# Patient Record
Sex: FEMALE | Race: WHITE | Employment: PART TIME | URBAN - METROPOLITAN AREA
[De-identification: names, ages, dates, MRNs, and addresses within clinical notes are randomized per-mention and may not be internally consistent; named-entity substitution may affect disease eponyms.]

---

## 2017-01-27 RX ORDER — TRAMADOL HYDROCHLORIDE 50 MG/1
50 TABLET ORAL
Qty: 45 TAB | Refills: 0 | OUTPATIENT
Start: 2017-01-27 | End: 2017-02-01 | Stop reason: SDUPTHER

## 2017-01-27 NOTE — TELEPHONE ENCOUNTER
From: Judy Hylton  To: Day Pack MD  Sent: 1/27/2017 1:53 PM EST  Subject: Medication Renewal Request    Original authorizing provider: MD Judy Linares would like a refill of the following medications:  traMADol (ULTRAM) 50 mg tablet Day Pack MD]    Preferred pharmacy: [facility] [address] [facility] [place] [address]:

## 2017-01-27 NOTE — TELEPHONE ENCOUNTER
Prescription for tramadol 50 mg tablet take 1 tab by mouth every 8 hours as needed for pain Max daily amount 150 mg #45 no refills called to [address] per written order of Dr. Veronica Melvin.   Prescription left on pharmacy voice mail at 4:04pm.

## 2017-02-01 RX ORDER — TRAMADOL HYDROCHLORIDE 50 MG/1
TABLET ORAL
Qty: 45 TAB | Refills: 0 | OUTPATIENT
Start: 2017-02-01 | End: 2017-04-04 | Stop reason: SDUPTHER

## 2017-02-02 NOTE — TELEPHONE ENCOUNTER
Prescription for tramadol 50 mg tablet take 1 tablet by mouth every 8 hours as needed for pain max 3 tabs per day #45 tab no refills called to [address] per written order of Dr. Carlos Helton. Prescription left on pharmacy voice mail at 8 am. Patient sent my chart message asking her to schedule an appt.

## 2017-04-04 ENCOUNTER — OFFICE VISIT (OUTPATIENT)
Dept: INTERNAL MEDICINE CLINIC | Age: 57
End: 2017-04-04

## 2017-04-04 VITALS
SYSTOLIC BLOOD PRESSURE: 133 MMHG | BODY MASS INDEX: 50.02 KG/M2 | RESPIRATION RATE: 18 BRPM | DIASTOLIC BLOOD PRESSURE: 82 MMHG | TEMPERATURE: 98.9 F | OXYGEN SATURATION: 98 % | WEIGHT: 293 LBS | HEIGHT: 64 IN | HEART RATE: 98 BPM

## 2017-04-04 DIAGNOSIS — M85.89 OSTEOPENIA OF MULTIPLE SITES: ICD-10-CM

## 2017-04-04 DIAGNOSIS — M51.26 LUMBAR HERNIATED DISC: ICD-10-CM

## 2017-04-04 DIAGNOSIS — M16.12 PRIMARY OSTEOARTHRITIS OF LEFT HIP: ICD-10-CM

## 2017-04-04 DIAGNOSIS — Z12.11 SCREEN FOR COLON CANCER: ICD-10-CM

## 2017-04-04 DIAGNOSIS — M51.36 DEGENERATIVE LUMBAR DISC: ICD-10-CM

## 2017-04-04 DIAGNOSIS — F41.8 DEPRESSION WITH ANXIETY: ICD-10-CM

## 2017-04-04 DIAGNOSIS — Z13.1 SCREENING FOR DIABETES MELLITUS: ICD-10-CM

## 2017-04-04 DIAGNOSIS — M79.7 FIBROMYALGIA: ICD-10-CM

## 2017-04-04 DIAGNOSIS — Z13.21 ENCOUNTER FOR VITAMIN DEFICIENCY SCREENING: ICD-10-CM

## 2017-04-04 DIAGNOSIS — Z12.4 SCREENING FOR CERVICAL CANCER: ICD-10-CM

## 2017-04-04 DIAGNOSIS — M54.32 LEFT SIDED SCIATICA: ICD-10-CM

## 2017-04-04 DIAGNOSIS — M17.0 PRIMARY OSTEOARTHRITIS OF BOTH KNEES: ICD-10-CM

## 2017-04-04 DIAGNOSIS — Z11.59 NEED FOR HEPATITIS C SCREENING TEST: ICD-10-CM

## 2017-04-04 DIAGNOSIS — Z13.220 SCREENING, LIPID: ICD-10-CM

## 2017-04-04 DIAGNOSIS — E66.01 MORBID OBESITY WITH BMI OF 50.0-59.9, ADULT (HCC): Primary | ICD-10-CM

## 2017-04-04 DIAGNOSIS — I10 ESSENTIAL HYPERTENSION: ICD-10-CM

## 2017-04-04 RX ORDER — CYCLOBENZAPRINE HCL 10 MG
10 TABLET ORAL
Qty: 30 TAB | Refills: 1 | Status: SHIPPED | OUTPATIENT
Start: 2017-04-04 | End: 2017-05-05 | Stop reason: SDUPTHER

## 2017-04-04 RX ORDER — VENLAFAXINE HYDROCHLORIDE 150 MG/1
150 CAPSULE, EXTENDED RELEASE ORAL DAILY
Qty: 30 CAP | Refills: 3 | Status: SHIPPED | OUTPATIENT
Start: 2017-04-04 | End: 2017-08-26 | Stop reason: SDUPTHER

## 2017-04-04 RX ORDER — BENAZEPRIL HYDROCHLORIDE 20 MG/1
20 TABLET ORAL DAILY
Qty: 30 TAB | Refills: 5 | Status: SHIPPED | OUTPATIENT
Start: 2017-04-04 | End: 2017-05-05 | Stop reason: SDUPTHER

## 2017-04-04 RX ORDER — TRAMADOL HYDROCHLORIDE 50 MG/1
50 TABLET ORAL
Qty: 45 TAB | Refills: 0 | Status: SHIPPED | OUTPATIENT
Start: 2017-04-04 | End: 2017-05-24 | Stop reason: SDUPTHER

## 2017-04-04 RX ORDER — VENLAFAXINE 75 MG/1
75 TABLET ORAL DAILY
COMMUNITY
End: 2017-04-04 | Stop reason: DRUGHIGH

## 2017-04-04 NOTE — PROGRESS NOTES
Reviewed record  In preparation for visit and have obtained necessary documentation. 1. Have you been to the ER, urgent care clinic since your last visit? Hospitalized since your last visit?no  2. Have you seen or consulted any other health care providers outside of the [address] [place] [address] since your last visit? Include any pap smears or colon screening. Saw Dr Askew July at [address] [place] [address]  Patient has been given information on advanced directives at a previous visit.

## 2017-04-04 NOTE — PATIENT INSTRUCTIONS

## 2017-04-04 NOTE — PROGRESS NOTES
CC:  Chief Complaint   Patient presents with    Other     disability paperwork     HISTORY OF PRESENT ILLNESS  Rainer Montenegro is a 49 y.o. female. Presents for evaluation and completion of disability paperwork. Last seen in clinic 4/15/16. She is uninsured. She has HTN, depression with anxiety, fibromyalgia, osteoarthritis at lumbar spine, morbid obesity, left leg weakness, and numbness at feet. Today she complains of diffuse muscle aches, chronic low back pain, and right knee pain behind the knee. Her fibromyalgia causes aching all the time, up to 10/10 pain, but same days are better than others. Ambulates with cane. Her back, hip, and knee pains have been gradually worsening. Pain worsens with prolonged standing and prolonged sitting. 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 States that she saw Dr. Latisha Lawson in September 2016 about right knee pain. He referred her to [address] [place] [address] for PT, but she never followed through because she could not afford it. He also ordered a knee MRI and referred her to a psychologist for coping skills for chronic pain. Saw the psychologist once in 10/16, but could not afford to return. Right knee MRI showed lateral meniscus tear. Was told she needed surgery but she never followed up. She had a lumbar MRI 7/6/15 that showed left T12-L1 disc herniation and degenerative spondylolisthesis at L4-5. Saw Dr. Teresa Hallman ([address] [place] [address]) and Bobo Villasenor [facility]). Dr. Teresa Hallman told her she needed a left hip replacement.      Soc Hx  . Has 1 daughter age 27 in good health. Works from home in [employer] business for the past 20 years. Never smoker. Used to drink alcohol occasionally but stopped all alcohol in 2005. Denies recreational drug use.  Does not get exercise due to her chronic pain.     ROS  Constitutional: negative for fevers, chills; positive for fatigue  ENT:   negative for sore throat, nasal congestion, ear pains  Respiratory:  negative for cough, wheezing; positive for mild dyspnea with exertion  CV:   negative for chest pain, palpitations, orthopnea, PND; positive for lower extremity edema  GI:   negative for heartburn, abd pain, nausea, vomiting, diarrhea, constipation  Genitourinary: negative for frequency, dysuria and hematuria  Integument:  negative for rash and pruritus  Musculoskel:  positive for myalgias, chronic low back pain  Neurological:  negative for dizziness; positive for numbness in feet and hands, gait problems (ambulates with cane), and occ headaches  Behavl/Psych: positive or feelings of anxiety, depression    Patient Active Problem List   Diagnosis Code    HTN (hypertension) I10    Numbness of feet R20.0    Osteopenia M85.80    Fibromyalgia M79.7    Left sided sciatica M54.32    Depression with anxiety F41.8    Lumbar herniated disc M51.26    Primary osteoarthritis of left hip M16.12    Primary osteoarthritis of both knees M17.0    Degenerative lumbar disc M51.36    Morbid obesity with BMI of 50.0-59.9, adult ([facility]) E66.01, Z68.43     Past Medical History:   Diagnosis Date    Depression with anxiety     Fibromyalgia     Hypertension     Morbid obesity ([facility]     Osteoarthritis of knee 2016    R knee X-ray 7/5/16 ([facility]) mod to severe tibiofemoral arthritis, mod patellofemoral arthritis    Osteoarthritis of left hip 2015    Osteopenia      No Known Allergies  Current Outpatient Prescriptions   Medication Sig Dispense Refill    FERROUS FUMARATE/VIT BCOMP,C (SUPER B COMPLEX PO) Take  by mouth.  venlafaxine (EFFEXOR) 75 mg tablet Take 75 mg by mouth daily.  OTHER Ibuprofen/lidocaine cream      traMADol (ULTRAM) 50 mg tablet TAKE ONE TABLET BY MOUTH EVERY 8 HOURS AS NEEDED FOR PAIN*MAX OF 3 TABS PER DAY* 45 Tab 0    cyclobenzaprine (FLEXERIL) 10 mg tablet Take 1 Tab by mouth three (3) times daily as needed for Muscle Spasm(s).  27 Tab 1    benazepril (LOTENSIN) 20 mg tablet Take 1 Tab by mouth daily. Indications: Hypertension 30 Tab 5    cholecalciferol (VITAMIN D3) 1,000 unit cap Take  by mouth daily.  pregabalin (LYRICA) 75 mg capsule Take 1 Cap by mouth two (2) times a day. Max Daily Amount: 150 mg. For fibromyalgia and numbness. 180 Cap 3         PHYSICAL EXAM  Visit Vitals    /82    Pulse 98    Temp 98.9 °F (37.2 °C) (Oral)    Resp 18    Ht 5' 4\" (1.626 m)    Wt 309 lb (140.2 kg)    LMP 02/04/2017    SpO2 98%    BMI 53.04 kg/m2       General: Morbidly obese, in no distress. Ambulates with cane. HEENT:  Head normocephalic/atraumatic, no scleral icterus. Lungs:  Clear to ausculation bilaterally. Good air movement. Heart:  Regular rate and rhythm, normal S1 and S2, no murmur, gallop, or rub  Extremities: No clubbing, cyanosis; trace bilateral lower extremity edema. Musculoskeletal: No tenderness at posterior right knee; no palpable mass. Normal ROM at right knee with mild crepitus. No right calf swelling or tenderness.   Neurological: Alert and oriented. Psychiatric: Normal mood and affect. Behavior is normal         ASSESSMENT AND PLAN    ICD-10-CM ICD-9-CM    1. Morbid obesity with BMI of 50.0-59.9, adult ([facility]) E66.01 278.01     Z68.43 V85.43    2. Fibromyalgia M79.7 729.1 traMADol (ULTRAM) 50 mg tablet      cyclobenzaprine (FLEXERIL) 10 mg tablet   3. Essential hypertension I10 401.9 benazepril (LOTENSIN) 20 mg tablet      METABOLIC PANEL, COMPREHENSIVE      CBC WITH AUTOMATED DIFF      TSH AND FREE T4   4. Osteopenia of multiple sites M85.89 733.90    5. Left sided sciatica M54.32 724.3    6. Depression with anxiety F41.8 300.4 venlafaxine-SR (EFFEXOR-XR) 150 mg capsule   7. Lumbar herniated disc M51.26 722.10    8. Primary osteoarthritis of left hip M16.12 715.15    9. Primary osteoarthritis of both knees M17.0 715.16    10. Degenerative lumbar disc M51.36 722.52    11.  Need for hepatitis C screening test Z11.59 V73.89 HEPATITIS C AB   12. Screening for cervical cancer Z12.4 V76.2 REFERRAL TO GYNECOLOGY   13. Screen for colon cancer Z12.11 V76.51 OCCULT BLOOD, IMMUNOASSAY (FIT)   14. Screening for diabetes mellitus Z13.1 V77.1 HEMOGLOBIN A1C WITH EAG   15. Screening, lipid Z13.220 V77.91 LIPID PANEL   16. Encounter for vitamin deficiency screening Z13.21 V77.99 VITAMIN D, 25 HYDROXY       Radha Jones was seen today for other. Diagnoses and all orders for this visit:    Morbid obesity with BMI of 50.0-59.9, adult ([facility] [address]    Fibromyalgia  Will complete disability form for patient and fax to enclosed number. -     traMADol (ULTRAM) 50 mg tablet; Take 1 Tab by mouth every eight (8) hours as needed for Pain. Max Daily Amount: 150 mg.  -     cyclobenzaprine (FLEXERIL) 10 mg tablet; Take 1 Tab by mouth three (3) times daily as needed for Muscle Spasm(s). Essential hypertension  -     benazepril (LOTENSIN) 20 mg tablet; Take 1 Tab by mouth daily. Indications: hypertension  -     METABOLIC PANEL, COMPREHENSIVE  -     CBC WITH AUTOMATED DIFF  -     TSH AND FREE T4    Osteopenia of multiple sites    Left sided sciatica    Depression with anxiety  -     venlafaxine-SR (EFFEXOR-XR) 150 mg capsule; Take 1 Cap by mouth daily. Indications: major depressive disorder    Lumbar herniated disc    Primary osteoarthritis of left hip    Primary osteoarthritis of both knees    Degenerative lumbar disc    Need for hepatitis C screening test  -     HEPATITIS C AB    Screening for cervical cancer  -     REFERRAL TO GYNECOLOGY    Screen for colon cancer  -     OCCULT BLOOD, IMMUNOASSAY (FIT)    Screening for diabetes mellitus  -     HEMOGLOBIN A1C WITH EAG    Screening, lipid  -     LIPID PANEL    Encounter for vitamin deficiency screening  -     VITAMIN D, 25 HYDROXY    Follow-up Disposition:  Return in about 3 months (around 7/4/2017), or if symptoms worsen or fail to improve, for Chronic pain.     Provided patient and/or family with advanced directive information and answered pertinent questions. Encouraged patient to provide a copy of advanced directive to the office when available. I have discussed the diagnosis with the patient and the intended plan as seen in the above orders. Patient is in agreement. The patient has received an after-visit summary and questions were answered concerning future plans. I have discussed medication side effects and warnings with the patient as well.

## 2017-04-04 NOTE — MR AVS SNAPSHOT
Visit Information Date & Time Provider Department Dept. Phone Encounter #  
 4/4/2017  2:00 PM Tomi Ho MD [address] [place] [address] [phone] [id number] Follow-up Instructions Return in about 3 months (around 7/4/2017), or if symptoms worsen or fail to improve, for Chronic pain. Upcoming Health Maintenance Date Due Hepatitis C Screening 1960 COLONOSCOPY 10/10/1980 PAP AKA CERVICAL CYTOLOGY 11/20/2016 BREAST CANCER SCRN MAMMOGRAM 6/29/2017 DTaP/Tdap/Td series (2 - Td) 4/4/2027 Allergies as of 4/4/2017  Review Complete On: 4/4/2017 By: Chente Moyer LPN No Known Allergies Current Immunizations  Reviewed on 6/14/2016 No immunizations on file. Not reviewed this visit You Were Diagnosed With   
  
 Codes Comments Morbid obesity with BMI of 50.0-59.9, adult ([facility]    -  Primary ICD-10-CM: E66.01, Z68.43 
ICD-9-CM: 278.01, V85.43 Fibromyalgia     ICD-10-CM: M79.7 ICD-9-CM: 729.1 Essential hypertension     ICD-10-CM: I10 
ICD-9-CM: 401.9 Osteopenia of multiple sites     ICD-10-CM: M85.89 ICD-9-CM: 733.90 Left sided sciatica     ICD-10-CM: M54.32 
ICD-9-CM: 724.3 Depression with anxiety     ICD-10-CM: F41.8 ICD-9-CM: 300.4 Lumbar herniated disc     ICD-10-CM: M51.26 
ICD-9-CM: 722.10 Primary osteoarthritis of left hip     ICD-10-CM: M16.12 
ICD-9-CM: 715.15 Primary osteoarthritis of both knees     ICD-10-CM: M17.0 ICD-9-CM: 715.16 Degenerative lumbar disc     ICD-10-CM: M51.36 
ICD-9-CM: 722.52 Need for hepatitis C screening test     ICD-10-CM: Z11.59 
ICD-9-CM: V73.89 Screening for cervical cancer     ICD-10-CM: Z12.4 ICD-9-CM: V76.2 Screen for colon cancer     ICD-10-CM: Z12.11 ICD-9-CM: V76.51 Screening for diabetes mellitus     ICD-10-CM: Z13.1 ICD-9-CM: V77.1 Screening, lipid     ICD-10-CM: W35.969 ICD-9-CM: V77.91   
 Encounter for vitamin deficiency screening     ICD-10-CM: Z13.21 ICD-9-CM: V77.99 Vitals BP Pulse Temp Resp Height(growth percentile) Weight(growth percentile) 133/82 98 98.9 °F (37.2 °C) (Oral) 18 5' 4\" (1.626 m) 309 lb (140.2 kg) LMP SpO2 BMI OB Status Smoking Status 02/04/2017 98% 53.04 kg/m2 Having regular periods Never Smoker Vitals History BMI and BSA Data Body Mass Index Body Surface Area 53.04 kg/m 2 2.52 m 2 Preferred Pharmacy Pharmacy Name Phone [facility] [address] [place], [place] - [address] [place] [address] Price [facility]ady [phone] Your Updated Medication List  
  
   
This list is accurate as of: 4/4/17  3:03 PM.  Always use your most recent med list.  
  
  
  
  
 benazepril 20 mg tablet Commonly known as:  LOTENSIN Take 1 Tab by mouth daily. Indications: hypertension  
  
 cholecalciferol 1,000 unit Cap Commonly known as:  VITAMIN D3 Take  by mouth daily. cyclobenzaprine 10 mg tablet Commonly known as:  FLEXERIL Take 1 Tab by mouth three (3) times daily as needed for Muscle Spasm(s). OTHER Ibuprofen/lidocaine cream  
  
 pregabalin 75 mg capsule Commonly known as:  Laura Jack Take 1 Cap by mouth two (2) times a day. Max Daily Amount: 150 mg. For fibromyalgia and numbness. SUPER B COMPLEX PO Take  by mouth. traMADol 50 mg tablet Commonly known as:  ULTRAM  
Take 1 Tab by mouth every eight (8) hours as needed for Pain. Max Daily Amount: 150 mg.  
  
 venlafaxine- mg capsule Commonly known as:  EFFEXOR-XR Take 1 Cap by mouth daily. Indications: major depressive disorder Prescriptions Printed Refills  
 traMADol (ULTRAM) 50 mg tablet 0 Sig: Take 1 Tab by mouth every eight (8) hours as needed for Pain. Max Daily Amount: 150 mg.  
 Class: Print Route: Oral  
  
Prescriptions Sent to Pharmacy  Refills  
 cyclobenzaprine (FLEXERIL) 10 mg tablet 1  
 Sig: Take 1 Tab by mouth three (3) times daily as needed for Muscle Spasm(s). Class: Normal  
 Pharmacy: [address] Ph #: [phone] Route: Oral  
 benazepril (LOTENSIN) 20 mg tablet 5 Sig: Take 1 Tab by mouth daily. Indications: hypertension Class: Normal  
 Pharmacy: [address] Ph #: [phone] Route: Oral  
 venlafaxine-SR (EFFEXOR-XR) 150 mg capsule 3 Sig: Take 1 Cap by mouth daily. Indications: major depressive disorder Class: Normal  
 Pharmacy: [address] Ph #: [phone] Route: Oral  
  
We Performed the Following CBC WITH AUTOMATED DIFF [78259 CPT(R)] HEMOGLOBIN A1C WITH EAG [21184 CPT(R)] HEPATITIS C AB [65717 CPT(R)] LIPID PANEL [01542 CPT(R)] METABOLIC PANEL, COMPREHENSIVE [98111 CPT(R)] OCCULT BLOOD, IMMUNOASSAY (FIT) [id number] CPT(R)] REFERRAL TO GYNECOLOGY [REF30 Custom] Comments:  
 Pap smear TSH AND FREE T4 [39736 CPT(R)] VITAMIN D, 25 HYDROXY [id number] CPT(R)] Follow-up Instructions Return in about 3 months (around 7/4/2017), or if symptoms worsen or fail to improve, for Chronic pain. Referral Information Referral ID Referred By Referred To  
  
 [id number] [facility]. [address] [place], [address] [facility] Visits Status Start Date End Date 1 New Request 4/4/17 4/4/18 If your referral has a status of pending review or denied, additional information will be sent to support the outcome of this decision. Patient Instructions Chronic Pain: Care Instructions Your Care Instructions Chronic pain is pain that lasts a long time (months or even years) and may or may not have a clear cause.  It is different from acute pain, which usually does have a clear causelike an injury or illnessand gets better over time. Chronic pain: 
· Lasts over time but may vary from day to day. · Does not go away despite efforts to end it. · May disrupt your sleep and lead to fatigue. · May cause depression or anxiety. · May make your muscles tense, causing more pain. · Can disrupt your work, hobbies, home life, and relationships with friends and family. Chronic pain is a very real condition. It is not just in your head. Treatment can help and usually includes several methods used together, such as medicines, physical therapy, exercise, and other treatments. Learning how to relax and changing negative thought patterns can also help you cope. Chronic pain is complex. Taking an active role in your treatment will help you better manage your pain. Tell your doctor if you have trouble dealing with your pain. You may have to try several things before you find what works best for you. Follow-up care is a key part of your treatment and safety. Be sure to make and go to all appointments, and call your doctor if you are having problems. Its also a good idea to know your test results and keep a list of the medicines you take. How can you care for yourself at home? · Pace yourself. Break up large jobs into smaller tasks. Save harder tasks for days when you have less pain, or go back and forth between hard tasks and easier ones. Take rest breaks. · Relax, and reduce stress. Relaxation techniques such as deep breathing or meditation can help. · Keep moving. Gentle, daily exercise can help reduce pain over the long run. Try low- or no-impact exercises such as walking, swimming, and stationary biking. Do stretches to stay flexible. · Try heat, cold packs, and massage. · Get enough sleep. Chronic pain can make you tired and drain your energy. Talk with your doctor if you have trouble sleeping because of pain. · Think positive. Your thoughts can affect your pain level.  Do things that you enjoy to distract yourself when you have pain instead of focusing on the pain. See a movie, read a book, listen to music, or spend time with a friend. · If you think you are depressed, talk to your doctor about treatment. · Keep a daily pain diary. Record how your moods, thoughts, sleep patterns, activities, and medicine affect your pain. You may find that your pain is worse during or after certain activities or when you are feeling a certain emotion. Having a record of your pain can help you and your doctor find the best ways to treat your pain. · Take pain medicines exactly as directed. ¨ If the doctor gave you a prescription medicine for pain, take it as prescribed. ¨ If you are not taking a prescription pain medicine, ask your doctor if you can take an over-the-counter medicine. Reducing constipation caused by pain medicine · Include fruits, vegetables, beans, and whole grains in your diet each day. These foods are high in fiber. · Drink plenty of fluids, enough so that your urine is light yellow or clear like water. If you have kidney, heart, or liver disease and have to limit fluids, talk with your doctor before you increase the amount of fluids you drink. · If your doctor recommends it, get more exercise. Walking is a good choice. Bit by bit, increase the amount you walk every day. Try for at least 30 minutes on most days of the week. · Schedule time each day for a bowel movement. A daily routine may help. Take your time and do not strain when having a bowel movement. When should you call for help? Call your doctor now or seek immediate medical care if: 
· Your pain gets worse or is out of control. · You feel down or blue, or you do not enjoy things like you once did. You may be depressed, which is common in people with chronic pain. Depression can be treated. · You have vomiting or cramps for more than 2 hours.  
Watch closely for changes in your health, and be sure to contact your doctor if: 
· You cannot sleep because of pain. · You are very worried or anxious about your pain. · You have trouble taking your pain medicine. · You have any concerns about your pain medicine. · You have trouble with bowel movements, such as: 
¨ No bowel movement in 3 days. ¨ Blood in the anal area, in your stool, or on the toilet paper. ¨ Diarrhea for more than 24 hours. Where can you learn more? Go to http://jac-jeff.info/. Enter N004 in the search box to learn more about \"Chronic Pain: Care Instructions. \" Current as of: October 14, 2016 Content Version: 11.2 © 7734-3543 "[employer]. Care instructions adapted under license by [employer] (which disclaims liability or warranty for this information). If you have questions about a medical condition or this instruction, always ask your healthcare professional. [address] any warranty or liability for your use of this information. Introducing [facility]! Dear Tenzin Conway: 
Thank you for requesting a X[employer]igo account. Our records indicate that you already have an active Xsigo account. You can access your account anytime at [email or web address] Did you know that you can access your hospital and ER discharge instructions at any time in Xsigo? You can also review all of your test results from your hospital stay or ER visit. Additional Information If you have questions, please visit the Frequently Asked Questions section of the Xsigo website at [email or web address] Remember, Xsigo is NOT to be used for urgent needs. For medical emergencies, dial 911. Now available from your iPhone and Android! Please provide this summary of care documentation to your next provider. Your primary care clinician is listed as Jam Espinosa. If you have any questions after today's visit, please call [phone].

## 2017-04-05 ENCOUNTER — DOCUMENTATION ONLY (OUTPATIENT)
Dept: INTERNAL MEDICINE CLINIC | Age: 57
End: 2017-04-05

## 2017-04-05 ENCOUNTER — TELEPHONE (OUTPATIENT)
Dept: INTERNAL MEDICINE CLINIC | Age: 57
End: 2017-04-05

## 2017-04-05 ENCOUNTER — PATIENT MESSAGE (OUTPATIENT)
Dept: INTERNAL MEDICINE CLINIC | Age: 57
End: 2017-04-05

## 2017-04-05 DIAGNOSIS — Z12.4 SCREENING FOR CERVICAL CANCER: Primary | ICD-10-CM

## 2017-04-05 LAB
25(OH)D3+25(OH)D2 SERPL-MCNC: 24.7 NG/ML (ref 30–100)
ALBUMIN SERPL-MCNC: 4.2 G/DL (ref 3.5–5.5)
ALBUMIN/GLOB SERPL: 1.3 {RATIO} (ref 1.2–2.2)
ALP SERPL-CCNC: 75 IU/L (ref 39–117)
ALT SERPL-CCNC: 13 IU/L (ref 0–32)
AST SERPL-CCNC: 17 IU/L (ref 0–40)
BASOPHILS # BLD AUTO: 0 X10E3/UL (ref 0–0.2)
BASOPHILS NFR BLD AUTO: 0 %
BILIRUB SERPL-MCNC: 0.3 MG/DL (ref 0–1.2)
BUN SERPL-MCNC: 10 MG/DL (ref 6–24)
BUN/CREAT SERPL: 19 (ref 9–23)
CALCIUM SERPL-MCNC: 9.6 MG/DL (ref 8.7–10.2)
CHLORIDE SERPL-SCNC: 98 MMOL/L (ref 96–106)
CHOLEST SERPL-MCNC: 196 MG/DL (ref 100–199)
CO2 SERPL-SCNC: 25 MMOL/L (ref 18–29)
CREAT SERPL-MCNC: 0.52 MG/DL (ref 0.57–1)
EOSINOPHIL # BLD AUTO: 0.1 X10E3/UL (ref 0–0.4)
EOSINOPHIL NFR BLD AUTO: 1 %
ERYTHROCYTE [DISTWIDTH] IN BLOOD BY AUTOMATED COUNT: 14.6 % (ref 12.3–15.4)
EST. AVERAGE GLUCOSE BLD GHB EST-MCNC: 111 MG/DL
GLOBULIN SER CALC-MCNC: 3.2 G/DL (ref 1.5–4.5)
GLUCOSE SERPL-MCNC: 87 MG/DL (ref 65–99)
HBA1C MFR BLD: 5.5 % (ref 4.8–5.6)
HCT VFR BLD AUTO: 43.8 % (ref 34–46.6)
HCV AB S/CO SERPL IA: 0.2 S/CO RATIO (ref 0–0.9)
HDLC SERPL-MCNC: 59 MG/DL
HGB BLD-MCNC: 14.1 G/DL (ref 11.1–15.9)
IMM GRANULOCYTES # BLD: 0 X10E3/UL (ref 0–0.1)
IMM GRANULOCYTES NFR BLD: 0 %
INTERPRETATION, 910389: NORMAL
LDLC SERPL CALC-MCNC: 114 MG/DL (ref 0–99)
LYMPHOCYTES # BLD AUTO: 1.7 X10E3/UL (ref 0.7–3.1)
LYMPHOCYTES NFR BLD AUTO: 25 %
MCH RBC QN AUTO: 27.8 PG (ref 26.6–33)
MCHC RBC AUTO-ENTMCNC: 32.2 G/DL (ref 31.5–35.7)
MCV RBC AUTO: 86 FL (ref 79–97)
MONOCYTES # BLD AUTO: 0.5 X10E3/UL (ref 0.1–0.9)
MONOCYTES NFR BLD AUTO: 8 %
NEUTROPHILS # BLD AUTO: 4.6 X10E3/UL (ref 1.4–7)
NEUTROPHILS NFR BLD AUTO: 66 %
PLATELET # BLD AUTO: 302 X10E3/UL (ref 150–379)
POTASSIUM SERPL-SCNC: 4.7 MMOL/L (ref 3.5–5.2)
PROT SERPL-MCNC: 7.4 G/DL (ref 6–8.5)
RBC # BLD AUTO: 5.07 X10E6/UL (ref 3.77–5.28)
SODIUM SERPL-SCNC: 139 MMOL/L (ref 134–144)
T4 FREE SERPL-MCNC: 1.01 NG/DL (ref 0.82–1.77)
TRIGL SERPL-MCNC: 115 MG/DL (ref 0–149)
TSH SERPL DL<=0.005 MIU/L-ACNC: 1.66 UIU/ML (ref 0.45–4.5)
VLDLC SERPL CALC-MCNC: 23 MG/DL (ref 5–40)
WBC # BLD AUTO: 6.9 X10E3/UL (ref 3.4–10.8)

## 2017-04-05 NOTE — PROGRESS NOTES
Disability paperwork faxed to Brandin Bruno at [phone]. Phone [phone]. Copy mailed to patient.

## 2017-04-06 NOTE — PROGRESS NOTES
Your labs showed normal kidney and liver tests, blood counts, thyroid, cholesterol, diabetes screening test, and hepatitis C screening test. Your vitamin D level was a little low. Vitamin D is important for the bones, muscles, and heart. Dr. J Luis Valentin recommends you start taking OTC vitamin D 2000 units once daily.

## 2017-04-16 LAB — HEMOCCULT STL QL IA: POSITIVE

## 2017-04-19 NOTE — PROGRESS NOTES
You already saw that your FIT test results were positive. You can stop by clinic to  a new FIT test for colon cancer screening to verify the first test results since you believe you may not have done the test correctly.

## 2017-04-21 ENCOUNTER — DOCUMENTATION ONLY (OUTPATIENT)
Dept: INTERNAL MEDICINE CLINIC | Age: 57
End: 2017-04-21

## 2017-04-26 ENCOUNTER — DOCUMENTATION ONLY (OUTPATIENT)
Dept: INTERNAL MEDICINE CLINIC | Age: 57
End: 2017-04-26

## 2017-05-05 ENCOUNTER — DOCUMENTATION ONLY (OUTPATIENT)
Dept: INTERNAL MEDICINE CLINIC | Age: 57
End: 2017-05-05

## 2017-05-05 DIAGNOSIS — M79.7 FIBROMYALGIA: ICD-10-CM

## 2017-05-05 DIAGNOSIS — I10 ESSENTIAL HYPERTENSION: ICD-10-CM

## 2017-05-05 RX ORDER — BENAZEPRIL HYDROCHLORIDE 20 MG/1
20 TABLET ORAL DAILY
Qty: 30 TAB | Refills: 5 | Status: SHIPPED | OUTPATIENT
Start: 2017-05-05 | End: 2017-06-01 | Stop reason: SDUPTHER

## 2017-05-05 RX ORDER — CYCLOBENZAPRINE HCL 10 MG
10 TABLET ORAL
Qty: 30 TAB | Refills: 1 | Status: SHIPPED | OUTPATIENT
Start: 2017-05-05 | End: 2017-06-01 | Stop reason: SDUPTHER

## 2017-05-15 DIAGNOSIS — M79.7 FIBROMYALGIA: ICD-10-CM

## 2017-05-15 RX ORDER — PREGABALIN 75 MG/1
75 CAPSULE ORAL 2 TIMES DAILY
Qty: 180 CAP | Refills: 3 | Status: SHIPPED | OUTPATIENT
Start: 2017-05-15 | End: 2017-06-05 | Stop reason: SDUPTHER

## 2017-05-16 ENCOUNTER — DOCUMENTATION ONLY (OUTPATIENT)
Dept: INTERNAL MEDICINE CLINIC | Age: 57
End: 2017-05-16

## 2017-05-16 NOTE — PROGRESS NOTES
Patient sent My Chart message notifying her of arrival of patient assistance samples from [facility]. Samples of Effexor  mg 1 bottle times 90 capsules.

## 2017-05-24 DIAGNOSIS — M79.7 FIBROMYALGIA: ICD-10-CM

## 2017-05-25 RX ORDER — TRAMADOL HYDROCHLORIDE 50 MG/1
50 TABLET ORAL
Qty: 45 TAB | Refills: 0 | OUTPATIENT
Start: 2017-05-25 | End: 2017-07-10 | Stop reason: SDUPTHER

## 2017-06-01 DIAGNOSIS — I10 ESSENTIAL HYPERTENSION: ICD-10-CM

## 2017-06-01 DIAGNOSIS — M79.7 FIBROMYALGIA: ICD-10-CM

## 2017-06-02 RX ORDER — BENAZEPRIL HYDROCHLORIDE 20 MG/1
20 TABLET ORAL DAILY
Qty: 30 TAB | Refills: 5 | Status: SHIPPED | OUTPATIENT
Start: 2017-06-02 | End: 2017-07-09 | Stop reason: SDUPTHER

## 2017-06-02 RX ORDER — CYCLOBENZAPRINE HCL 10 MG
10 TABLET ORAL
Qty: 30 TAB | Refills: 1 | Status: SHIPPED | OUTPATIENT
Start: 2017-06-02 | End: 2017-07-10 | Stop reason: SDUPTHER

## 2017-06-02 NOTE — TELEPHONE ENCOUNTER
From: Suhas Pena  To: Etta Gold MD  Sent: 6/1/2017 10:09 PM EDT  Subject: Medication Renewal Request    Original authorizing provider: MD Suhas Orellana would like a refill of the following medications:  benazepril (LOTENSIN) 20 mg tablet Etta Gold MD]    Preferred pharmacy: [facility] [address] [facility]    Comment:

## 2017-06-02 NOTE — TELEPHONE ENCOUNTER
From: Merary Lynch  To: Anitra Jim MD  Sent: 6/1/2017 10:10 PM EDT  Subject: Medication Renewal Request    Original authorizing provider: MD Merary Posadas would like a refill of the following medications:  cyclobenzaprine (FLEXERIL) 10 mg tablet Anitra Jim MD]    Preferred pharmacy: [facility] [address] [place] [address]    Comment:

## 2017-06-05 DIAGNOSIS — M79.7 FIBROMYALGIA: ICD-10-CM

## 2017-06-05 RX ORDER — PREGABALIN 75 MG/1
75 CAPSULE ORAL 2 TIMES DAILY
Qty: 180 CAP | Refills: 3 | Status: SHIPPED | OUTPATIENT
Start: 2017-06-05 | End: 2017-12-02 | Stop reason: SDUPTHER

## 2017-06-20 ENCOUNTER — DOCUMENTATION ONLY (OUTPATIENT)
Dept: INTERNAL MEDICINE CLINIC | Age: 57
End: 2017-06-20

## 2017-06-20 NOTE — PROGRESS NOTES
Spoke to [employer] at [phone]. Gave patients physical home address. Per rep that was all they needed for Lyrica to be processed and mailed to patient.

## 2017-07-09 DIAGNOSIS — M79.7 FIBROMYALGIA: ICD-10-CM

## 2017-07-09 DIAGNOSIS — I10 ESSENTIAL HYPERTENSION: ICD-10-CM

## 2017-07-10 RX ORDER — BENAZEPRIL HYDROCHLORIDE 20 MG/1
20 TABLET ORAL DAILY
Qty: 30 TAB | Refills: 5 | Status: SHIPPED | OUTPATIENT
Start: 2017-07-10 | End: 2017-08-08 | Stop reason: SDUPTHER

## 2017-07-10 RX ORDER — TRAMADOL HYDROCHLORIDE 50 MG/1
50 TABLET ORAL
Qty: 45 TAB | Refills: 0 | OUTPATIENT
Start: 2017-07-10 | End: 2017-08-08 | Stop reason: SDUPTHER

## 2017-07-10 RX ORDER — CYCLOBENZAPRINE HCL 10 MG
10 TABLET ORAL
Qty: 30 TAB | Refills: 1 | Status: SHIPPED | OUTPATIENT
Start: 2017-07-10 | End: 2017-08-08 | Stop reason: SDUPTHER

## 2017-07-10 NOTE — TELEPHONE ENCOUNTER
Prescription for tramadol 50 mg tablet take 1 tablet by mouth every 8 hours as needed max daily amount 150 mg #45 no refills called to [facility] per written order of Dr. Johanny Ortega.   Prescription left on pharmacy voice mail at 1:09 pm.

## 2017-07-10 NOTE — TELEPHONE ENCOUNTER
From: Roseline Smith  To: Cedric Lozada MD  Sent: 7/9/2017 10:17 PM EDT  Subject: Medication Renewal Request    Original authorizing provider: MD Yuliana Urbano would like a refill of the following medications:  traMADol (ULTRAM) 50 mg tablet Cedric Lozada MD]  benazepril (LOTENSIN) 20 mg tablet Cedric Lozada MD]  cyclobenzaprine (FLEXERIL) 10 mg tablet Cedric Lozada MD]    Preferred pharmacy: [facility] [address] [place] [address]    Comment:

## 2017-08-08 DIAGNOSIS — I10 ESSENTIAL HYPERTENSION: ICD-10-CM

## 2017-08-08 DIAGNOSIS — M79.7 FIBROMYALGIA: ICD-10-CM

## 2017-08-08 RX ORDER — CYCLOBENZAPRINE HCL 10 MG
10 TABLET ORAL
Qty: 30 TAB | Refills: 3 | Status: SHIPPED | OUTPATIENT
Start: 2017-08-08 | End: 2017-09-05 | Stop reason: SDUPTHER

## 2017-08-08 RX ORDER — BENAZEPRIL HYDROCHLORIDE 20 MG/1
20 TABLET ORAL DAILY
Qty: 30 TAB | Refills: 5 | Status: SHIPPED | OUTPATIENT
Start: 2017-08-08 | End: 2017-09-05 | Stop reason: SDUPTHER

## 2017-08-08 RX ORDER — TRAMADOL HYDROCHLORIDE 50 MG/1
50 TABLET ORAL
Qty: 45 TAB | Refills: 0 | OUTPATIENT
Start: 2017-08-08 | End: 2017-09-05

## 2017-08-08 NOTE — TELEPHONE ENCOUNTER
Prescription for tramadol 50 mg tablet take one tablet by mouth every 8 hours as needed for pain max daily dosage 150 mg #45 no refills called to [address] [place] [address] per written order of Dr. Carmen Waite.   Prescription left on pharmacy voice mail at 4:25 pm .

## 2017-08-08 NOTE — TELEPHONE ENCOUNTER
From: Geovanni Philip  To: Danica Chandler MD  Sent: 8/8/2017 2:15 PM EDT  Subject: Medication Renewal Request    Original authorizing provider: Danica Chandler MD    [place] Reg[facility] Browne Beatriz would like a refill of the following medications:  traMADol (ULTRAM) 50 mg tablet Danica Chandler MD]  benazepril (LOTENSIN) 20 mg tablet Danica Chandler MD]  cyclobenzaprine (FLEXERIL) 10 mg tablet Danica Chandler MD]    Preferred pharmacy: [facility], [address] [place] [address]    Comment:

## 2017-08-26 DIAGNOSIS — F41.8 DEPRESSION WITH ANXIETY: ICD-10-CM

## 2017-08-28 NOTE — TELEPHONE ENCOUNTER
From: Lauren Wilkerson  To: Malia Collado MD  Sent: 8/26/2017 7:37 PM EDT  Subject: Medication Renewal Request    Original authorizing provider: MD Radha Cloud would like a refill of the following medications:  venlafaxine-SR (EFFEXOR-XR) 150 mg capsule Malia Collado MD]    Preferred pharmacy: Other - Prescription comes from [facility]     Comment:  Dr. Enrico Opitz I only have about 2 wks of Effexor left . [facility] asks that you request refills now. They ask that you call [phone] my patient Id is [id number] If possible could you confirm that they received the note you sent about me receiving the Effexor at home instead of having to come to the Office and pick it up . I was told a request was sent to them but i never heard anymore about it.  "[employer] told me that it had to be approved when i last spoke with them , Thank you

## 2017-08-31 RX ORDER — VENLAFAXINE HYDROCHLORIDE 150 MG/1
150 CAPSULE, EXTENDED RELEASE ORAL DAILY
Qty: 30 CAP | Refills: 3 | Status: SHIPPED | OUTPATIENT
Start: 2017-08-31 | End: 2017-12-02 | Stop reason: SDUPTHER

## 2017-09-06 ENCOUNTER — DOCUMENTATION ONLY (OUTPATIENT)
Dept: INTERNAL MEDICINE CLINIC | Age: 57
End: 2017-09-06

## 2017-09-06 NOTE — PROGRESS NOTES
Patient notified via phone that [employer] patient assistance samples are available at office for pickup.

## 2017-09-08 ENCOUNTER — PATIENT MESSAGE (OUTPATIENT)
Dept: INTERNAL MEDICINE CLINIC | Age: 57
End: 2017-09-08

## 2017-09-26 ENCOUNTER — OFFICE VISIT (OUTPATIENT)
Dept: INTERNAL MEDICINE CLINIC | Age: 57
End: 2017-09-26

## 2017-09-26 VITALS
HEIGHT: 64 IN | HEART RATE: 100 BPM | BODY MASS INDEX: 50.02 KG/M2 | WEIGHT: 293 LBS | OXYGEN SATURATION: 96 % | SYSTOLIC BLOOD PRESSURE: 143 MMHG | RESPIRATION RATE: 18 BRPM | TEMPERATURE: 98.8 F | DIASTOLIC BLOOD PRESSURE: 84 MMHG

## 2017-09-26 DIAGNOSIS — G89.29 CHRONIC BILATERAL LOW BACK PAIN WITH LEFT-SIDED SCIATICA: ICD-10-CM

## 2017-09-26 DIAGNOSIS — F11.90 CHRONIC, CONTINUOUS USE OF OPIOIDS: ICD-10-CM

## 2017-09-26 DIAGNOSIS — F41.8 DEPRESSION WITH ANXIETY: ICD-10-CM

## 2017-09-26 DIAGNOSIS — Z12.4 ENCOUNTER FOR SCREENING FOR CERVICAL CANCER: ICD-10-CM

## 2017-09-26 DIAGNOSIS — I10 ESSENTIAL HYPERTENSION: Primary | ICD-10-CM

## 2017-09-26 DIAGNOSIS — Z12.39 ENCOUNTER FOR SCREENING FOR MALIGNANT NEOPLASM OF BREAST: ICD-10-CM

## 2017-09-26 DIAGNOSIS — E66.01 MORBID OBESITY WITH BMI OF 50.0-59.9, ADULT (HCC): ICD-10-CM

## 2017-09-26 DIAGNOSIS — M54.42 CHRONIC BILATERAL LOW BACK PAIN WITH LEFT-SIDED SCIATICA: ICD-10-CM

## 2017-09-26 DIAGNOSIS — M79.7 FIBROMYALGIA: ICD-10-CM

## 2017-09-26 RX ORDER — PHENTERMINE HYDROCHLORIDE 37.5 MG/1
37.5 TABLET ORAL
Qty: 30 TAB | Refills: 2 | Status: SHIPPED | OUTPATIENT
Start: 2017-09-26 | End: 2018-03-27 | Stop reason: ALTCHOICE

## 2017-09-26 RX ORDER — TRAMADOL HYDROCHLORIDE 50 MG/1
50 TABLET ORAL
Qty: 45 TAB | Refills: 0 | Status: SHIPPED | OUTPATIENT
Start: 2017-09-26 | End: 2017-12-02 | Stop reason: SDUPTHER

## 2017-09-26 NOTE — MR AVS SNAPSHOT
Visit Information Date & Time Provider Department Dept. Phone Encounter #  
 9/26/2017  2:20 PM Solange Barr MD Chas Jalloh [phone] [id number] Follow-up Instructions Return in about 3 months (around 12/26/2017), or if symptoms worsen or fail to improve, for HTN, chronic pain, weight. Upcoming Health Maintenance Date Due  
 PAP AKA CERVICAL CYTOLOGY 11/20/2016 BREAST CANCER SCRN MAMMOGRAM 6/29/2017 FOBT Q 1 YEAR, 18+ 4/10/2018 DTaP/Tdap/Td series (2 - Td) 4/4/2027 COLONOSCOPY 9/25/2027 Allergies as of 9/26/2017  Review Complete On: 9/26/2017 By: Solange Barr MD  
 No Known Allergies Current Immunizations  Reviewed on 6/14/2016 No immunizations on file. Not reviewed this visit You Were Diagnosed With   
  
 Codes Comments Essential hypertension    -  Primary ICD-10-CM: I10 
ICD-9-CM: 401.9 Fibromyalgia     ICD-10-CM: M79.7 ICD-9-CM: 729.1 Depression with anxiety     ICD-10-CM: F41.8 ICD-9-CM: 300.4 Morbid obesity with BMI of 50.0-59.9, adult (HCC)     ICD-10-CM: E66.01, Z68.43 
ICD-9-CM: 278.01, V85.43 Chronic bilateral low back pain with left-sided sciatica     ICD-10-CM: M54.42, G89.29 ICD-9-CM: 724.2, 724.3, 338.29 Encounter for screening for malignant neoplasm of breast     ICD-10-CM: Z12.39 
ICD-9-CM: V76.10 Encounter for screening for cervical cancer      ICD-10-CM: Z12.4 ICD-9-CM: V76.2 Chronic, continuous use of opioids     ICD-10-CM: F11.90 ICD-9-CM: 305.51 Vitals BP Pulse Temp Resp Height(growth percentile) Weight(growth percentile) 143/84 100 98.8 °F (37.1 °C) (Oral) 18 5' 4\" (1.626 m) 323 lb (146.5 kg) LMP SpO2 BMI OB Status Smoking Status 07/26/2017 96% 55.44 kg/m2 Having regular periods Never Smoker Vitals History BMI and BSA Data Body Mass Index Body Surface Area 55.44 kg/m 2 2.57 m 2 Preferred Pharmacy Pharmacy Name Phone [facility] [address] [place], [place] - [address] [place] [address] Marci Man [phone] Your Updated Medication List  
  
   
This list is accurate as of: 9/26/17  3:09 PM.  Always use your most recent med list.  
  
  
  
  
 benazepril 20 mg tablet Commonly known as:  LOTENSIN Take 1 Tab by mouth daily. Must see doctor for refill  Indications: hypertension  
  
 cholecalciferol 1,000 unit Cap Commonly known as:  VITAMIN D3 Take  by mouth daily. cyclobenzaprine 10 mg tablet Commonly known as:  FLEXERIL Take 1 Tab by mouth three (3) times daily as needed for Muscle Spasm(s). Must see doctor for refill  
  
 phentermine 37.5 mg tablet Commonly known as:  ADIPEX-P Take 1 Tab by mouth every morning. Max Daily Amount: 37.5 mg. For weight management. pregabalin 75 mg capsule Commonly known as:  Charlesetta Oms Take 1 Cap by mouth two (2) times a day. Max Daily Amount: 150 mg. For fibromyalgia and numbness. SUPER B COMPLEX PO Take  by mouth. traMADol 50 mg tablet Commonly known as:  ULTRAM  
Take 1 Tab by mouth every eight (8) hours as needed for Pain. Max Daily Amount: 150 mg.  
  
 venlafaxine- mg capsule Commonly known as:  EFFEXOR-XR Take 1 Cap by mouth daily. Indications: major depressive disorder Prescriptions Printed Refills  
 phentermine (ADIPEX-P) 37.5 mg tablet 2 Sig: Take 1 Tab by mouth every morning. Max Daily Amount: 37.5 mg. For weight management. Class: Print Route: Oral  
 traMADol (ULTRAM) 50 mg tablet 0 Sig: Take 1 Tab by mouth every eight (8) hours as needed for Pain. Max Daily Amount: 150 mg.  
 Class: Print Route: Oral  
  
We Performed the Following [address] MONITORING [[id number] Custom] REFERRAL TO GYNECOLOGY [REF30 Custom] Comments:  
 Please see for PAP smear. Follow-up Instructions  Return in about 3 months (around 12/26/2017), or if symptoms worsen or fail to improve, for HTN, chronic pain, weight. To-Do List   
 11/23/2017 Imaging:  MARITO MAMMO BI SCREENING INCL CAD Referral Information Referral ID Referred By Referred To  
  
 [id number] [facility], [address] MD Torin Valencia IV [address] [facility], [address] Phone: [phone] Fax: [phone] Visits Status Start Date End Date 1 New Request 9/26/17 9/26/18 If your referral has a status of pending review or denied, additional information will be sent to support the outcome of this decision. Patient Instructions Starting a Weight Loss Plan: Care Instructions Your Care Instructions If you are thinking about losing weight, it can be hard to know where to start. Your doctor can help you set up a weight loss plan that best meets your needs. You may want to take a class on nutrition or exercise, or join a weight loss support group. If you have questions about how to make changes to your eating or exercise habits, ask your doctor about seeing a registered dietitian or an exercise specialist. 
It can be a big challenge to lose weight. But you do not have to make huge changes at once. Make small changes, and stick with them. When those changes become habit, add a few more changes. If you do not think you are ready to make changes right now, try to pick a date in the future. Make an appointment to see your doctor to discuss whether the time is right for you to start a plan. Follow-up care is a key part of your treatment and safety. Be sure to make and go to all appointments, and call your doctor if you are having problems. Its also a good idea to know your test results and keep a list of the medicines you take. How can you care for yourself at home? · Set realistic goals. Many people expect to lose much more weight than is likely. A weight loss of 5% to 10% of your body weight may be enough to improve your health. · Get family and friends involved to provide support. Talk to them about why you are trying to lose weight, and ask them to help. They can help by participating in exercise and having meals with you, even if they may be eating something different. · Find what works best for you. If you do not have time or do not like to cook, a program that offers meal replacement bars or shakes may be better for you. Or if you like to prepare meals, finding a plan that includes daily menus and recipes may be best. 
· Ask your doctor about other health professionals who can help you achieve your weight loss goals. ¨ A dietitian can help you make healthy changes in your diet. ¨ An exercise specialist or  can help you develop a safe and effective exercise program. 
¨ A counselor or psychiatrist can help you cope with issues such as depression, anxiety, or family problems that can make it hard to focus on weight loss. · Consider joining a support group for people who are trying to lose weight. Your doctor can suggest groups in your area. Where can you learn more? Go to http://jac-jeff.info/. Enter C972 in the search box to learn more about \"Starting a Weight Loss Plan: Care Instructions. \" Current as of: October 13, 2016 Content Version: 11.3 © 5044-5420 [employer]. Care instructions adapted under license by [facility] (which disclaims liability or warranty for this information). If you have questions about a medical condition or this instruction, always ask your healthcare professional. [place] [address] any warranty or liability for your use of this information. Introducing [facility]! Dear Medford Schaumann: 
Thank you for requesting a "[employer] account. Our records indicate that you already have an active "[employer]" account. You can access your account anytime at [email or web address] Did you know that you can access your hospital and ER discharge instructions at any time in OTOY? You can also review all of your test results from your hospital stay or ER visit. Additional Information If you have questions, please visit the Frequently Asked Questions section of the OTOY website at [email or web address] Remember, OTOY is NOT to be used for urgent needs. For medical emergencies, dial 911. Now available from your iPhone and Android! Please provide this summary of care documentation to your next provider. Your primary care clinician is listed as Glenny Koenig. If you have any questions after today's visit, please call [phone].

## 2017-09-26 NOTE — PATIENT INSTRUCTIONS

## 2017-09-26 NOTE — PROGRESS NOTES
Reviewed record  In preparation for visit and have obtained necessary documentation. 1. Have you been to the ER, urgent care clinic since your last visit? Hospitalized since your last visit?no  2. Have you seen or consulted any other health care providers outside of the [address] [place] [address] since your last visit? Include any pap smears or colon screening. Saw GI   Advanced directives: Patient has been given information on advanced directives at a previous visit. Patients vital signs discussed with physician. Declines flu.

## 2017-09-26 NOTE — PROGRESS NOTES
CC:   Chief Complaint   Patient presents with    Hypertension    Fibromyalgia       HISTORY OF PRESENT ILLNESS  Gris Bullock is a 49 y.o. female. Presents for 6 month follow up evaluation. She is uninsured. She has HTN, depression with anxiety, fibromyalgia, chronic pain, osteoarthritis at lumbar spine, morbid obesity, and numbness at feet. Today she complains of diffuse muscle aches, chronic low back pain, bilateral knee and hip pains. Ambulates with cane. Pain worsens with prolonged standing and prolonged sitting. Also complains of numbness and aching at both hands, swelling at both ankles and feet,  and redness, heaviness, and cold sensation at feet. Applied for disability this year, but was denied.     She had a lumbar MRI 7/6/15 that showed left T12-L1 disc herniation and degenerative spondylolisthesis at L4-5. Saw Dr. Joey Diaz ([address] ) and [facility]). Dr. Joey Diaz told her she needed a left hip replacement but she could not afford it. Saw Dr. Hillis Scheuermann in September 2016 about right knee pain. He referred her to [facility] for PT, but she never followed through because she could not afford it. He also ordered a knee MRI and referred her to a psychologist for coping skills for chronic pain. Saw the psychologist once in 10/16, but could not afford to return. Right knee MRI showed lateral meniscus tear. Was told she needed surgery but she never followed up. Chronic Pain  Gris Bullock RTC today to follow up on chronic pain diagnosis. We discussed her osteoarthritis, fibromyalgia, radiculopathy and spondylolisthesis that is affecting her back, bilateral hand, bilateral hip and bilateral knee and causing depression. Significant changes since last visit: none. She is  able to do her normal daily activities. She reports the following adverse side effects: none. Least pain over the last week has been 7/10.     Worst pain over the last week has been 10/10. Opioid Risk Tool Reviewed: YES    Aberrant behaviors: None. Urine Drug Screen: due - order placed. Controlled substance agreement on file: YES.  reviewed:yes    Pill count is consistent with her prescription: yes, based on  review, she did not bring in prescription bottles    Concomitant use of a benzodiazepine: no    Active daily MME is 0. Naloxone prescription not warranted. Also,  abstinence syndrome was reviewed and discussed with her today N/A      Soc Hx  . Has 1 daughter age 27 in good health. 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 Never smoker. Used to drink alcohol occasionally but stopped all alcohol in . Denies recreational drug use.  Does not get exercise due to her chronic pain.      ROS  Constitutional: negative for fevers, chills, night sweats;  positive for fatigue  ENT:   negative for sore throat, nasal congestion, ear pains, hoarseness  Respiratory:  negative for cough, hemoptysis, wheezing;positive for mild dyspnea with exertion  CV:   negative for chest pain, palpitations;  positive for lower extremity edema  GI:   negative for heartburn, abd pain, nausea, vomiting, diarrhea, constipation  Genitourinary: negative for frequency, dysuria and hematuria  Integument:  negative for rash and pruritus  Musculoskel: positive for myalgias, chronic low back pain  Neurological:  negative for dizziness; positive for numbness in feet and hands, gait problems (ambulates with cane), and occ headaches  Behavl/Psych: positive or feelings of anxiety, depression     Patient Active Problem List   Diagnosis Code    HTN (hypertension) I10    Numbness of feet R20.0    Osteopenia M85.80    Fibromyalgia M79.7    Left sided sciatica M54.32    Depression with anxiety F41.8    Lumbar herniated disc M51.26    Primary osteoarthritis of left hip M16.12    Primary osteoarthritis of both knees M17.0    Degenerative lumbar disc M51.36    Morbid obesity with BMI of 50.0-59.9, adult ([facility]) E66.01, Z68.43     Past Medical History:   Diagnosis Date    Depression with anxiety     Fibromyalgia     Hypertension     Morbid obesity ([address]     Osteoarthritis of knee 2016    R knee X-ray 7/5/16 ([facility]) mod to severe tibiofemoral arthritis, mod patellofemoral arthritis    Osteoarthritis of left hip 2015    Osteopenia      No Known Allergies  Current Outpatient Prescriptions   Medication Sig Dispense Refill    benazepril (LOTENSIN) 20 mg tablet Take 1 Tab by mouth daily. Must see doctor for refill  Indications: hypertension 30 Tab 0    cyclobenzaprine (FLEXERIL) 10 mg tablet Take 1 Tab by mouth three (3) times daily as needed for Muscle Spasm(s). Must see doctor for refill 30 Tab 0    venlafaxine-SR (EFFEXOR-XR) 150 mg capsule Take 1 Cap by mouth daily. Indications: major depressive disorder 30 Cap 3    pregabalin (LYRICA) 75 mg capsule Take 1 Cap by mouth two (2) times a day. Max Daily Amount: 150 mg. For fibromyalgia and numbness. 180 Cap 3    FERROUS FUMARATE/VIT BCOMP,C (SUPER B COMPLEX PO) Take  by mouth.  cholecalciferol (VITAMIN D3) 1,000 unit cap Take  by mouth daily. PHYSICAL EXAM  Visit Vitals    /84    Pulse 100    Temp 98.8 °F (37.1 °C) (Oral)    Resp 18    Ht 5' 4\" (1.626 m)    Wt 323 lb (146.5 kg)    LMP 07/26/2017    SpO2 96%    BMI 55.44 kg/m2   14 lb weight gain over past 6 months. General: Morbidly obese, in no distress. Ambulates with cane. HEENT:  Head normocephalic/atraumatic, no scleral icterus. Lungs:  Clear to ausculation bilaterally. Good air movement. Heart:  Regular rate and rhythm, normal S1 and S2, no murmur, gallop, or rub  Extremities: No clubbing, cyanosis; trace bilateral lower extremity edema. 2+ pedal pulses bilaterally. Musculoskeletal: Normal ROM with mild crepitus bilaterally (R > L).  No right calf swelling or tenderness.   Neurological: Alert and oriented. Psychiatric: Tearful. Behavior is normal     Results for orders placed or performed in visit on 04/10/17   OCCULT BLOOD, IMMUNOASSAY   Result Value Ref Range    Occult blood fecal, by [place] Positive (A) Negative     She declined colonoscopy. ASSESSMENT AND PLAN    ICD-10-CM ICD-9-CM    1. Essential hypertension I10 401.9    2. Fibromyalgia M79.7 729.1 traMADol (ULTRAM) 50 mg tablet   3. Depression with anxiety F41.8 300.4    4. Morbid obesity with BMI of 50.0-59.9, adult ([facility]) E66.01 278.01     Z68.43 V85.43    5. Chronic bilateral low back pain with left-sided sciatica M54.42 724.2     G89.29 724.3      338.29    6. Encounter for screening for malignant neoplasm of breast Z12.39 V76.10 MARITO MAMMO BI SCREENING INCL CAD   7. Encounter for screening for cervical cancer  Z12.4 V76.2 REFERRAL TO GYNECOLOGY   8. Chronic, continuous use of opioids F11.90 305.51 COMPLIANCE DRUG SCREEN/PRESCRIPTION MONITORING     Diagnoses and all orders for this visit:    1. Essential hypertension  Slightly elevated at 143/84. Better controlled previously. Will reassess at next clinic visit. 2. Fibromyalgia  Continue Lyrica, Tramadol, and Flexeril.  -     Refill traMADol (ULTRAM) 50 mg tablet; Take 1 Tab by mouth every eight (8) hours as needed for Pain. Max Daily Amount: 150 mg.    3. Depression with anxiety  Continue venlafaxine. Would benefit from counseling but cannot afford it. 4. Morbid obesity with BMI of 50.0-59.9, adult ([facility]  Counseled on diet, exercise, and weight loss. Follow up BMI in 3 months.       -Start phentermine 37.5 mg every morning, #30, 2 RF. 5. Chronic bilateral low back pain with left-sided sciatica    6. Encounter for screening for malignant neoplasm of breast  -     MARITO MAMMO BI SCREENING INCL CAD; Future (through [facility] [address])    7.  Encounter for screening for cervical cancer   -     REFERRAL TO GYNECOLOGY (through [facility])    8. Chronic, continuous use of opioids  -     COMPLIANCE DRUG SCREEN/PRESCRIPTION MONITORING      Follow-up Disposition:  Return in about 3 months (around 12/26/2017), or if symptoms worsen or fail to improve, for HTN, chronic pain, weight. Provided patient and/or family with advanced directive information and answered pertinent questions. Encouraged patient to provide a copy of advanced directive to the office when available. I have discussed the diagnosis with the patient and the intended plan as seen in the above orders. Patient is in agreement. The patient has received an after-visit summary and questions were answered concerning future plans. I have discussed medication side effects and warnings with the patient as well.

## 2017-09-26 NOTE — LETTER
Name:Dora Bell :1960 MR #:[id number] Provider Nemo Santos MD  
*Delilah.Fraction* BSMG-491 () Page 1 of 5 Initial [employer] CONTROLLED SUBSTANCE AGREEMENT I may be prescribed medications that are controlled substances as part  of my treatment plan for management of my medical condition(s). The goal of my treatment plan is to maintain and/or improve my health and wellbeing. Because controlled substances have an increased risk of abuse or harm, continual re-evaluation is needed determine if the goals of my treatment plan are being met for my safety and the safety of others. Chang Jim  am entering into this Controlled Substance Agreement with my provider, Ruthie Villa MD at [facility] . I understand that successful treatment requires mutual trust and honesty between me and my provider. I understand that there are state and federal laws and regulations which apply to the medications that my provider may prescribe that must be followed. I understand there are risks and benefits ts of taking the medicines that my provider may prescribe. I understand and agree that following this Agreement is necessary in continuing my provider-patient relationship and success of my treatment plan. As a part of my treatment plan, I agree to the following: COMMUNICATION: 
 
1. I will communicate fully with my provider about my medical condition(s), including the effect on my daily life and how well my medications are helping. I will tell my provider all of the medications that I take for any reason, including medications I receive from another health care provider, and will notify my provider about all issues, problems or concerns, including any side effects, which may be related to my medications. I understand that this information allows my provider to adjust my treatment plan to help manage my medical condition.  I understand that this information will become part of my permanent medical record. 2. I will notify my provider if I have a history of alcohol/drug misuse/addiction or if I have had treatment for alcohol/drug addiction in the past, or if I have a new problem with or concern about alcohol/drug use/addiction, because this increases the likelihood of high risk behaviors and may lead to serious medical conditions. 3. Females Only: I will notify my provider if I am or become pregnant, or if I intend to become pregnant, or if I intend to breastfeed. I understand that communication of these issues with my provider is important, due to possible effects my medication could have on an unborn fetus or breastfeeding child. Name:.Ana Nair :1960 MR #:[id number] Provider Ferris Boast, MD  
*Duniaa.Back* BSMG-491 () Page 2 of 5 Initial [employer] MISUSE OF MEDICATIONS / DRUGS: 
 
1. I agree to take all controlled substances as prescribed, and will not misuse or abuse any controlled substances prescribed by my provider. For my safety, I will not increase the amount of medicine I take without first talking with and getting permission from my provider. 2. If I have a medical emergency, another health care provider may prescribe me medication. If I seek emergency treatment, I will notify my provider within seventy-two (72) hours. 3. I understand that my provider may discuss my use and/or possible misuse/abuse of controlled substances and alcohol, as appropriate, with any health care provider involved in my care, pharmacist or legal authority. ILLEGAL DRUGS: 
 
1. I will not use illegal drugs of any kind, including but not limited to marijuana, heroin, cocaine, or any prescription drug which is not prescribed to me. DRUG DIVERSION / PRESCRIPTION FRAUD: 
 
1. I will not share, sell, trade, give away, or otherwise misuse my prescriptions or medications. 2. I will not alter any prescriptions provided to me by my provider. SINGLE PROVIDER: 
 
1. I agree that all controlled substances that I take will be prescribed only by my provider (or his/her covering provider) under this Agreement. This agreement does not prevent me from seeking emergency medical treatment or receiving pain management related to a surgery. PROTECTING MEDICATIONS: 
 
1. I am responsible for keeping my prescriptions and medications in a safe and secure place including safeguarding them from loss or theft. I understand that lost, stolen or damaged/destroyed prescriptions or medications will not be replaced. Name:.Ana Gold :1960 MR #:[id number] Provider Estanislado Dakins, MD  
*Dar.Meeter* BSMG-491 () Page 3 of 5 Initial [employer] PRESCRIPTION RENEWALS/REFILLS: 
 
1. I will follow my controlled substance medication schedule as prescribed by my provider. 2. I understand and agree that I will make any requests for renewals or refills of my prescriptions only at the time of an office visit or during my providers regular office hours subject to the prescription refill requirements of the individual practice. 3. I understand that my provider may not call in prescriptions for controlled substances to my pharmacy. 4. I understand that my provider may adjust or discontinue these medications as deemed appropriate for my medical treatment plan. This Agreement does not guarantee the prescription of controlled medications. 5. I agree that if my medications are adjusted or discontinued, I will properly dispose of any remaining medications. I understand that I will be required to dispose of any remaining controlled medications prior to being provided with any prescriptions for other controlled medications.  
 
 
1. I authorize my provider and my pharmacy to cooperate fully with any local, state, or federal law enforcement agency in the investigation of any possible misuse, sale, or other diversion of my controlled substance prescriptions or medications. RISKS: 
 
 
1. I understand that if I do not adhere to this Agreement in any way, my provider may change my prescriptions, stop prescribing controlled substances or end our provider-patient relationship. 2. If my provider decides to stop prescribing medication, or decides to end our provider-patient relationship,my provider may require that I taper my medications slowly. If necessary, my provider may also provide a prescription for other medications to treat my withdrawal symptoms. UNDERSTANDING THIS AGREEMENT: 
 
I understand that my provider may adjust or stop my prescriptions for controlled substances based on my medical condition and my treatment plan. I understand that this Agreement does not guarantee that I will be prescribed medications or controlled substances. I understand that controlled substances may be just one part 
of my treatment plan. My initial on each page and my signature below shows that I have read each page of this Agreement, I have had an opportunity to ask questions, and all of my questions have been answered to my satisfaction by my provider. By signing below, I agree to comply with this Agreement, and I understand that if I do not follow the Agreements listed above, my provider may stop 
 
 
 
_________________________________________  Date/Time 9/26/2017 3:11 PM   
             (Patient Signature)

## 2017-12-04 ENCOUNTER — DOCUMENTATION ONLY (OUTPATIENT)
Dept: INTERNAL MEDICINE CLINIC | Age: 57
End: 2017-12-04

## 2017-12-04 DIAGNOSIS — Z79.899 HIGH RISK MEDICATION USE: Primary | ICD-10-CM

## 2017-12-08 ENCOUNTER — DOCUMENTATION ONLY (OUTPATIENT)
Dept: INTERNAL MEDICINE CLINIC | Age: 57
End: 2017-12-08

## 2017-12-08 ENCOUNTER — TELEPHONE (OUTPATIENT)
Dept: INTERNAL MEDICINE CLINIC | Age: 57
End: 2017-12-08

## 2017-12-08 DIAGNOSIS — M79.7 FIBROMYALGIA: ICD-10-CM

## 2017-12-08 RX ORDER — PREGABALIN 75 MG/1
75 CAPSULE ORAL 2 TIMES DAILY
Qty: 180 CAP | Refills: 3 | Status: SHIPPED | OUTPATIENT
Start: 2017-12-08 | End: 2017-12-26 | Stop reason: DRUGHIGH

## 2017-12-08 NOTE — PROGRESS NOTES
[address] Bonilla/Katie Winn patient assistance at [phone]. Patient identification number is [id number]. Patients enrollment with [facility] is good thru 5/11/2018 but she needs a new rx for lyrica. Rx for lyrica faxed to [phone].

## 2017-12-08 NOTE — TELEPHONE ENCOUNTER
[address] Bonilla/Katie Winn patient assistance at [phone] and placed order for lyrica. Patient identification number is [id number]. Patients enrollment with [facility] is good thru 5/11/2018 but she needs a new rx for lyrica. Fax to [phone].

## 2017-12-08 NOTE — TELEPHONE ENCOUNTER
From: Kaitlynn Mullins Sent: 12/7/2017   5:01 PM        [facility]   Subject: /Refcris                                   Pt has some questions about some Rx she requested on My Chart     Best contact is [phone]

## 2017-12-08 NOTE — TELEPHONE ENCOUNTER
Patient notified that effexor samples arrived at office. Patient explained that she has to leave urine for urine drug screen.

## 2017-12-26 ENCOUNTER — OFFICE VISIT (OUTPATIENT)
Dept: INTERNAL MEDICINE CLINIC | Age: 57
End: 2017-12-26

## 2017-12-26 VITALS
HEART RATE: 101 BPM | DIASTOLIC BLOOD PRESSURE: 68 MMHG | OXYGEN SATURATION: 99 % | BODY MASS INDEX: 50.02 KG/M2 | HEIGHT: 64 IN | SYSTOLIC BLOOD PRESSURE: 149 MMHG | WEIGHT: 293 LBS | TEMPERATURE: 98.7 F | RESPIRATION RATE: 18 BRPM

## 2017-12-26 DIAGNOSIS — M79.7 FIBROMYALGIA: ICD-10-CM

## 2017-12-26 DIAGNOSIS — R60.0 BILATERAL LEG EDEMA: ICD-10-CM

## 2017-12-26 DIAGNOSIS — R06.02 SHORTNESS OF BREATH: ICD-10-CM

## 2017-12-26 DIAGNOSIS — M16.12 PRIMARY OSTEOARTHRITIS OF LEFT HIP: ICD-10-CM

## 2017-12-26 DIAGNOSIS — I10 ESSENTIAL HYPERTENSION: Primary | ICD-10-CM

## 2017-12-26 DIAGNOSIS — E66.01 MORBID OBESITY WITH BMI OF 50.0-59.9, ADULT (HCC): ICD-10-CM

## 2017-12-26 DIAGNOSIS — M51.36 DEGENERATIVE LUMBAR DISC: ICD-10-CM

## 2017-12-26 DIAGNOSIS — F41.8 DEPRESSION WITH ANXIETY: ICD-10-CM

## 2017-12-26 DIAGNOSIS — M54.32 LEFT SIDED SCIATICA: ICD-10-CM

## 2017-12-26 RX ORDER — PREGABALIN 150 MG/1
150 CAPSULE ORAL 2 TIMES DAILY
Qty: 60 CAP | Refills: 5 | Status: SHIPPED | OUTPATIENT
Start: 2017-12-26 | End: 2018-01-05 | Stop reason: SDUPTHER

## 2017-12-26 RX ORDER — BENAZEPRIL HYDROCHLORIDE 40 MG/1
40 TABLET ORAL DAILY
Qty: 30 TAB | Refills: 11 | Status: SHIPPED | OUTPATIENT
Start: 2017-12-26 | End: 2018-09-08 | Stop reason: SDUPTHER

## 2017-12-26 RX ORDER — TRAMADOL HYDROCHLORIDE 50 MG/1
50 TABLET ORAL
Qty: 45 TAB | Refills: 0 | Status: SHIPPED | OUTPATIENT
Start: 2017-12-26 | End: 2018-02-08 | Stop reason: SDUPTHER

## 2017-12-26 RX ORDER — FUROSEMIDE 20 MG/1
20 TABLET ORAL
Qty: 30 TAB | Refills: 5 | Status: SHIPPED | OUTPATIENT
Start: 2017-12-26 | End: 2018-03-27 | Stop reason: ALTCHOICE

## 2017-12-26 NOTE — PROGRESS NOTES
CC:   Chief Complaint   Patient presents with    Hypertension    Pain (Chronic)    Weight Management       HISTORY OF PRESENT ILLNESS  Hamilton Shaw is a 56 y.o. female. Presents for 6 month follow up evaluation. She has HTN, depression with anxiety, fibromyalgia, chronic pain, osteoarthritis at lumbar spine, morbid obesity, and numbness at feet. Today she has no new complaints. Continues to have diffuse muscle aches, chronic low back pain, left sided buttock pain, bilateral knee and hip pains. Ambulates with cane. Pain worsens with prolonged standing and prolonged sitting. Takes Tramadol 50 mg 1 tab once daily for pain to make it last longer. On Lyrica 75 mg twice daily but states it is not helping pain much. Is mostly sedentary. Does not get out of her house much due both to physical pain and mild depression. Applied for disability earlier this year, but was denied.     Also complains of numbness at both hands and feet. Reports that on 12/18/17, she had a blood vessel burst at the left 2nd finger and leave a purplish discoloration that took about 2 days to resolve. Lost about 8 lbs over 3 months on phentermine. She had a lumbar MRI 7/6/15 that showed left T12-L1 disc herniation and degenerative spondylolisthesis at L4-5. Saw Dr. Hermelindo Cuba ([address] [place] [address]) and [facility]). Dr. Hermelindo Cuba told her she needed a left hip replacement but she could not afford it. Saw Dr. Tony Marion in September 2016 about right knee pain. He referred her to [address] [place] [address] for PT, but she never followed through because she could not afford it. He also ordered a knee MRI and referred her to a psychologist for coping skills for chronic pain. Saw the psychologist once in 10/16, but could not afford to return. Right knee MRI showed lateral meniscus tear. Was told she needed surgery but she never followed up.     Chronic Pain  Hamilton Shaw RTC today to follow up on chronic pain diagnosis. We discussed her osteoarthritis, fibromyalgia, radiculopathy and spondylolisthesis that is affecting her back, bilateral hand, bilateral hip and bilateral knee and causing depression. Significant changes since last visit: none. She is  able to do her normal daily activities. She reports the following adverse side effects: none.     Least pain over the last week has been 7/10.     Worst pain over the last week has been 10/10.     Opioid Risk Tool Reviewed: YES     Aberrant behaviors: None.       Urine Drug Screen: Last checked 9/26/17.     Controlled substance agreement on file: YES.        reviewed:yes     Pill count is consistent with her prescription: yes, based on  review, she did not bring in prescription bottles     Concomitant use of a benzodiazepine: no     Active daily MME is 15 mg.     Naloxone prescription not warranted.          Soc Hx  . Has 1 adult daughter in good health. 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 Never smoker. Used to drink alcohol occasionally but stopped all alcohol in 2005. Denies recreational drug use.  Does not get exercise due to her chronic pain.      ROS  Constitutional: negative for fevers, chills, night sweats; positive for fatigue  ENT:   negative for sore throat, nasal congestion, ear pains, hoarseness  Respiratory:  negative for cough, hemoptysis, wheezing; positive for mild dyspnea with exertion  CV:   negative for chest pain, palpitations, lower extremity edema  GI:   negative for heartburn, abd pain, nausea, vomiting, diarrhea, constipation  Genitourinary: negative for frequency, dysuria and hematuria  Integument:  negative for rash and pruritus  Musculoskel: negative for muscle weakness; positive for myalgias, chronic low back pain  Neurological:  negative for dizziness; positive for numbness in feet and hands, gait problems (ambulates with cane), and occ headaches  Behavl/Psych: positive or feelings of anxiety, depression    Patient Active Problem List   Diagnosis Code    HTN (hypertension) I10    Numbness of feet R20.0    Osteopenia M85.80    Fibromyalgia M79.7    Left sided sciatica M54.32    Depression with anxiety F41.8    Lumbar herniated disc M51.26    Primary osteoarthritis of left hip M16.12    Primary osteoarthritis of both knees M17.0    Degenerative lumbar disc M51.36    Morbid obesity with BMI of 50.0-59.9, adult ([facility]) E66.01, Z68.43    High risk medication use Z79.899     Past Medical History:   Diagnosis Date    Depression with anxiety     Fibromyalgia     Hypertension     Morbid obesity ([facility]     Osteoarthritis of knee 2016    R knee X-ray 7/5/16 ([facility]) mod to severe tibiofemoral arthritis, mod patellofemoral arthritis    Osteoarthritis of left hip 2015    Osteopenia      No Known Allergies     Current Outpatient Prescriptions   Medication Sig Dispense Refill    OTHER Vitamin d with amino acids      pregabalin (LYRICA) 75 mg capsule Take 1 Cap by mouth two (2) times a day. Max Daily Amount: 150 mg. For fibromyalgia and numbness. 180 Cap 3    venlafaxine-SR (EFFEXOR-XR) 150 mg capsule Take 1 Cap by mouth daily. Indications: major depressive disorder 90 Cap 3    traMADol (ULTRAM) 50 mg tablet Take 1 Tab by mouth every eight (8) hours as needed for Pain. Max Daily Amount: 150 mg. 45 Tab 0    benazepril (LOTENSIN) 20 mg tablet Take 1 Tab by mouth daily. Must see doctor for refill  Indications: hypertension 30 Tab 11    phentermine (ADIPEX-P) 37.5 mg tablet Take 1 Tab by mouth every morning. Max Daily Amount: 37.5 mg. For weight management. 30 Tab 2    cholecalciferol (VITAMIN D3) 1,000 unit cap Take  by mouth daily.            PHYSICAL EXAM  Visit Vitals    /68 (BP 1 Location: Right arm)    Pulse (!) 101    Temp 98.7 °F (37.1 °C) (Oral)    Resp 18    Ht 5' 4\" (1.626 m)    Wt 315 lb (142.9 kg)  LMP 09/01/2017    SpO2 99%    BMI 54.07 kg/m2       General: Morbidly obese, no distress. HEENT:  Head normocephalic/atraumatic, no scleral icterus  Neck: Supple. No carotid bruits, JVD, lymphadenopathy, or thyromegaly. Lungs:  Clear to ausculation bilaterally. Good air movement. Heart:  Regular rate and rhythm, normal S1 and S2, no murmur, gallop, or rub  Extremities: No clubbing, cyanosis. 1+ pitting bilateral LE edema especially at ankles and feet. Neurological: Alert and oriented. Psychiatric: Normal mood and affect. Behavior is normal.         ASSESSMENT AND PLAN    ICD-10-CM ICD-9-CM    1. Essential hypertension I10 401.9 benazepril (LOTENSIN) 40 mg tablet   2. Fibromyalgia M79.7 729.1 traMADol (ULTRAM) 50 mg tablet      pregabalin (LYRICA) 150 mg capsule   3. Degenerative lumbar disc M51.36 722.52    4. Left sided sciatica M54.32 724.3    5. Morbid obesity with BMI of 50.0-59.9, adult ([facility]) E66.01 278.01     Z68.43 V85.43    6. Depression with anxiety F41.8 300.4    7. Primary osteoarthritis of left hip M16.12 715.15    8. Shortness of breath R06.02 786.05    9. Bilateral leg edema R60.0 782.3        Diagnoses and all orders for this visit:    1. Essential hypertension  Elevated at 149/68 today. Was a little high at last clinic visit also. Increase benazepril from 20 to 40 mg daily.  -     Start benazepril (LOTENSIN) 40 mg tablet; Take 1 Tab by mouth daily. 2. Fibromyalgia  Increase Lyrica from 75 mg BID to 150 mg BID. -     Refill traMADol (ULTRAM) 50 mg tablet; Take 1 Tab by mouth every eight (8) hours as needed for Pain. Max Daily Amount: 150 mg.  -     pregabalin (LYRICA) 150 mg capsule; Take 1 Cap by mouth two (2) times a day. Max Daily Amount: 300 mg.    3. Degenerative lumbar disc    4. Left sided sciatica    5. Morbid obesity with BMI of 50.0-59.9, adult ([facility]  Discussed the patient's BMI with her.  The BMI follow up plan is as follows: dietary management education, guidance, and counseling; encourage exercise; monitor weight; prescribed dietary intake. Follow up BMI in 6 months. -     Stop phentermine since she has not lost much weight on it. 6. Depression with anxiety    7. Primary osteoarthritis of left hip    8. Shortness of breath    9. Bilateral leg edema  -     Start furosemide (LASIX) 20 mg tablet; Take 1 Tab by mouth daily as needed. Leg swelling  Indications: Edema    Patient Instructions    1. Consider going to weight loss clinic. [facility]  Dr. Sallie Torres, [facility]  [place], [place]  Direct Information Line: [phone]    2. Increase benazepril from 20 to 40 mg daily. 3. Increase Lyrica to 150 mg twice daily. Follow-up Disposition: Not on File RTC in 6 months. Provided patient and/or family with advanced directive information and answered pertinent questions. Encouraged patient to provide a copy of advanced directive to the office when available. I have discussed the diagnosis with the patient and the intended plan as seen in the above orders. Patient is in agreement. The patient has received an after-visit summary and questions were answered concerning future plans. I have discussed medication side effects and warnings with the patient as well.

## 2017-12-26 NOTE — PATIENT INSTRUCTIONS
Patient Instructions    1. Consider going to weight loss clinic. [facility]  Dr. Sherly Aguilar, [facility]  [place], [place]  Direct Information Line: [phone]    2. Increase benazepril from 20 to 40 mg daily. 3. Increase Lyrica to 150 mg twice daily.

## 2017-12-26 NOTE — PROGRESS NOTES
Reviewed record  In preparation for visit and have obtained necessary documentation. 1. Have you been to the ER, urgent care clinic since your last visit? Hospitalized since your last visit?no  2. Have you seen or consulted any other health care providers outside of the [address] [place] [address] since your last visit? Include any pap smears or colon screening. no  Advanced directives: Patient has been given information on advanced directives at a previous visit. Patients vital signs discussed with physician. [employer] patient assistance paperwork given to patient. Patient given rx for tramadol written on 12/4/17 after giving urine sample for drug compliance screening. Lyrica rx set aside to be sent with [facility] application.

## 2017-12-26 NOTE — MR AVS SNAPSHOT
Visit Information Date & Time Provider Department Dept. Phone Encounter #  
 12/26/2017  2:40 PM Butch Alicea MD [facility] [phone] [id number] Upcoming Health Maintenance Date Due  
 PAP AKA CERVICAL CYTOLOGY 11/20/2018* FOBT Q 1 YEAR, 18+ 4/10/2018 DTaP/Tdap/Td series (2 - Td) 4/4/2027 COLONOSCOPY 9/25/2027 *Topic was postponed. The date shown is not the original due date. Allergies as of 12/26/2017  Review Complete On: 12/26/2017 By: Butch Alicea MD  
 No Known Allergies Current Immunizations  Reviewed on 6/14/2016 No immunizations on file. Not reviewed this visit You Were Diagnosed With   
  
 Codes Comments Essential hypertension    -  Primary ICD-10-CM: I10 
ICD-9-CM: 401.9 Fibromyalgia     ICD-10-CM: M79.7 ICD-9-CM: 729.1 Degenerative lumbar disc     ICD-10-CM: M51.36 
ICD-9-CM: 722.52 Left sided sciatica     ICD-10-CM: M54.32 
ICD-9-CM: 724.3 Morbid obesity with BMI of 50.0-59.9, adult (H[facility])     ICD-10-CM: E66.01, Z68.43 
ICD-9-CM: 278.01, V85.43 Depression with anxiety     ICD-10-CM: F41.8 ICD-9-CM: 300.4 Primary osteoarthritis of left hip     ICD-10-CM: M16.12 
ICD-9-CM: 715.15 Shortness of breath     ICD-10-CM: R06.02 
ICD-9-CM: 786.05 Bilateral leg edema     ICD-10-CM: R60.0 ICD-9-CM: 556. 3 Vitals BP Pulse Temp Resp Height(growth percentile) Weight(growth percentile) 149/68 (BP 1 Location: Right arm) (!) 101 98.7 °F (37.1 °C) (Oral) 18 5' 4\" (1.626 m) 315 lb (142.9 kg) LMP SpO2 BMI OB Status Smoking Status 09/01/2017 99% 54.07 kg/m2 Having regular periods Never Smoker Vitals History BMI and BSA Data Body Mass Index Body Surface Area 54.07 kg/m 2 2.54 m 2 Preferred Pharmacy Pharmacy Name Phone [facility] [address] [facility], [facility] [address] [place] [phone] Your Updated Medication List  
  
   
 This list is accurate as of: 12/26/17  3:50 PM.  Always use your most recent med list.  
  
  
  
  
 benazepril 40 mg tablet Commonly known as:  LOTENSIN Take 1 Tab by mouth daily. cholecalciferol 1,000 unit Cap Commonly known as:  VITAMIN D3 Take  by mouth daily. furosemide 20 mg tablet Commonly known as:  LASIX Take 1 Tab by mouth daily as needed. Leg swelling  Indications: Edema OTHER Vitamin d with amino acids  
  
 phentermine 37.5 mg tablet Commonly known as:  ADIPEX-P Take 1 Tab by mouth every morning. Max Daily Amount: 37.5 mg. For weight management. pregabalin 150 mg capsule Commonly known as:  [place] Schlatter Take 1 Cap by mouth two (2) times a day. Max Daily Amount: 300 mg.  
  
 traMADol 50 mg tablet Commonly known as:  ULTRAM  
Take 1 Tab by mouth every eight (8) hours as needed for Pain. Max Daily Amount: 150 mg.  
  
 venlafaxine- mg capsule Commonly known as:  EFFEXOR-XR Take 1 Cap by mouth daily. Indications: major depressive disorder Prescriptions Printed Refills  
 traMADol (ULTRAM) 50 mg tablet 0 Sig: Take 1 Tab by mouth every eight (8) hours as needed for Pain. Max Daily Amount: 150 mg.  
 Class: Print Route: Oral  
 pregabalin (LYRICA) 150 mg capsule 5 Sig: Take 1 Cap by mouth two (2) times a day. Max Daily Amount: 300 mg. Class: Print Route: Oral  
  
Prescriptions Sent to Pharmacy Refills  
 furosemide (LASIX) 20 mg tablet 5 Sig: Take 1 Tab by mouth daily as needed. Leg swelling  Indications: Edema Class: Normal  
 Pharmacy: [facility] [address] [place] [address] Ph #: [phone] Route: Oral  
 benazepril (LOTENSIN) 40 mg tablet 11 Sig: Take 1 Tab by mouth daily. Class: Normal  
 Pharmacy: [id number] [place] [address] Ph #: [phone] Route: Oral  
  
Patient Instructions Patient Instructions 1. Consider going to weight loss clinic. [facility] 
Dr. Alphonso Fleming,  [place], [place] Direct Information Line: [phone] 
 
2. Increase benazepril from 20 to 40 mg daily. 3. Increase Lyrica to 150 mg twice daily. Introducing [facility]! Dear Carol Cardozo: 
Thank you for requesting a [employer] account. Our records indicate that you already have an active [employer] account. You can access your account anytime at [email or web address] Did you know that you can access your hospital and ER discharge instructions at any time in [employer]? You can also review all of your test results from your hospital stay or ER visit. Additional Information If you have questions, please visit the Frequently Asked Questions section of the [employer] website at [email or web address] Remember, [employer] is NOT to be used for urgent needs. For medical emergencies, dial 911. Now available from your iPhone and Android! Please provide this summary of care documentation to your next provider. Your primary care clinician is listed as Bal Cash. If you have any questions after today's visit, please call [phone].

## 2018-01-03 LAB — DRUGS UR: NORMAL

## 2018-01-05 DIAGNOSIS — F41.8 DEPRESSION WITH ANXIETY: ICD-10-CM

## 2018-01-05 DIAGNOSIS — M79.7 FIBROMYALGIA: ICD-10-CM

## 2018-01-05 RX ORDER — PREGABALIN 150 MG/1
150 CAPSULE ORAL 2 TIMES DAILY
Qty: 180 CAP | Refills: 3 | Status: SHIPPED | OUTPATIENT
Start: 2018-01-05 | End: 2018-03-27 | Stop reason: ALTCHOICE

## 2018-01-05 RX ORDER — VENLAFAXINE HYDROCHLORIDE 150 MG/1
150 CAPSULE, EXTENDED RELEASE ORAL DAILY
Qty: 90 CAP | Refills: 3 | Status: SHIPPED | OUTPATIENT
Start: 2018-01-05 | End: 2018-09-08 | Stop reason: SDUPTHER

## 2018-01-09 ENCOUNTER — DOCUMENTATION ONLY (OUTPATIENT)
Dept: INTERNAL MEDICINE CLINIC | Age: 58
End: 2018-01-09

## 2018-02-08 DIAGNOSIS — M79.7 FIBROMYALGIA: ICD-10-CM

## 2018-02-09 RX ORDER — TRAMADOL HYDROCHLORIDE 50 MG/1
TABLET ORAL
Qty: 45 TAB | Refills: 0 | OUTPATIENT
Start: 2018-02-09 | End: 2018-04-12 | Stop reason: SDUPTHER

## 2018-02-15 ENCOUNTER — TELEPHONE (OUTPATIENT)
Dept: INTERNAL MEDICINE CLINIC | Age: 58
End: 2018-02-15

## 2018-02-15 DIAGNOSIS — F41.8 DEPRESSION WITH ANXIETY: ICD-10-CM

## 2018-02-15 RX ORDER — VENLAFAXINE HYDROCHLORIDE 150 MG/1
150 CAPSULE, EXTENDED RELEASE ORAL DAILY
Qty: 90 CAP | Refills: 3 | Status: CANCELLED | OUTPATIENT
Start: 2018-02-15

## 2018-02-15 NOTE — TELEPHONE ENCOUNTER
Effexor refill placed thru [email or web address] Patient Assistance. Order number #[id number], and expected to arrive in 7-10 business days.

## 2018-03-27 ENCOUNTER — OFFICE VISIT (OUTPATIENT)
Dept: INTERNAL MEDICINE CLINIC | Facility: CLINIC | Age: 58
End: 2018-03-27

## 2018-03-27 VITALS
BODY MASS INDEX: 50.02 KG/M2 | WEIGHT: 293 LBS | OXYGEN SATURATION: 97 % | HEART RATE: 102 BPM | TEMPERATURE: 98.2 F | SYSTOLIC BLOOD PRESSURE: 121 MMHG | HEIGHT: 64 IN | DIASTOLIC BLOOD PRESSURE: 77 MMHG | RESPIRATION RATE: 16 BRPM

## 2018-03-27 DIAGNOSIS — M17.0 PRIMARY OSTEOARTHRITIS OF BOTH KNEES: ICD-10-CM

## 2018-03-27 DIAGNOSIS — M16.12 PRIMARY OSTEOARTHRITIS OF LEFT HIP: ICD-10-CM

## 2018-03-27 DIAGNOSIS — F41.8 DEPRESSION WITH ANXIETY: ICD-10-CM

## 2018-03-27 DIAGNOSIS — M54.32 LEFT SIDED SCIATICA: ICD-10-CM

## 2018-03-27 DIAGNOSIS — I10 ESSENTIAL HYPERTENSION: ICD-10-CM

## 2018-03-27 DIAGNOSIS — Z79.899 HIGH RISK MEDICATION USE: ICD-10-CM

## 2018-03-27 DIAGNOSIS — E66.01 MORBID OBESITY WITH BMI OF 50.0-59.9, ADULT (HCC): ICD-10-CM

## 2018-03-27 DIAGNOSIS — M85.89 OSTEOPENIA OF MULTIPLE SITES: ICD-10-CM

## 2018-03-27 DIAGNOSIS — M79.7 FIBROMYALGIA: Primary | ICD-10-CM

## 2018-03-27 RX ORDER — NAPROXEN 500 MG/1
500 TABLET ORAL
Qty: 60 TAB | Refills: 1 | Status: SHIPPED | OUTPATIENT
Start: 2018-03-27 | End: 2018-06-26 | Stop reason: ALTCHOICE

## 2018-03-27 RX ORDER — ASCORBIC ACID 250 MG
1000 TABLET ORAL DAILY
COMMUNITY
End: 2021-04-14

## 2018-03-27 RX ORDER — DICLOFENAC SODIUM 10 MG/G
GEL TOPICAL 4 TIMES DAILY
COMMUNITY
End: 2018-03-27 | Stop reason: ALTCHOICE

## 2018-03-27 NOTE — PATIENT INSTRUCTIONS
Fibromyalgia: Care Instructions  Your Care Instructions    Fibromyalgia is a painful condition that is not completely understood by medical experts. The cause of fibromyalgia is not known. It can make you feel tired and ache all over. It causes tender spots at specific points of the body that hurt only when you press on them. You may have trouble sleeping, as well as other symptoms. These problems can upset your work and home life. Symptoms tend to come and go, although they may never go away completely. Fibromyalgia does not harm your muscles, joints, or organs. Follow-up care is a key part of your treatment and safety. Be sure to make and go to all appointments, and call your doctor if you are having problems. It's also a good idea to know your test results and keep a list of the medicines you take. How can you care for yourself at home? · Exercise often. Walk, swim, or bike to help with pain and sleep problems and to make you feel better. · Try to get a good night's sleep. Go to bed and get up at the same time each day, whether you feel rested or not. Make sure you have a good mattress and pillow. · Reduce stress. Avoid things that cause you stress, if you can. If not, work at making them less stressful. Learn to use biofeedback, guided imagery, meditation, or other methods to relax. · Make healthy changes. Eat a balanced diet, quit smoking, and limit alcohol and caffeine. · Use a heating pad set on low or take warm baths or showers for pain. Using cold packs for up to 20 minutes at a time can also relieve pain. Put a thin cloth between the cold pack and your skin. A gentle massage might help too. · Be safe with medicines. Take your medicines exactly as prescribed. Call your doctor if you think you are having a problem with your medicine. Your doctor may talk to you about taking antidepressant medicines. These medicines may improve sleep, relieve pain, and in some cases treat depression.   · Learn about fibromyalgia. This makes coping easier. Then, take an active role in your treatment. · Think about joining a support group with others who have fibromyalgia to learn more and get support. When should you call for help? Watch closely for changes in your health, and be sure to contact your doctor if:  ? · You feel sad, helpless, or hopeless; lose interest in things you used to enjoy; or have other symptoms of depression. ? · Your fibromyalgia symptoms get worse. Where can you learn more? Go to http://jac-jeff.info/. Enter V003 in the search box to learn more about \"Fibromyalgia: Care Instructions. \"  Current as of: October 14, 2016  Content Version: 11.4  © 0703-6274 [employer]wise, [employer]. Care instructions adapted under license by [employer] (which disclaims liability or warranty for this information). If you have questions about a medical condition or this instruction, always ask your healthcare professional. [address] any warranty or liability for your use of this information.

## 2018-03-27 NOTE — PROGRESS NOTES
CC:   Chief Complaint   Patient presents with    Hypertension    Fibromyalgia       HISTORY OF PRESENT ILLNESS  Basilia Templeton is a 56 y.o. female. Presents for 3 month follow up evaluation. She has HTN, depression with anxiety, fibromyalgia, chronic pain, osteoarthritis at lumbar spine, morbid obesity, and numbness at feet. Today she has no new complaints. Taking curcumin for OA; seems to be helping. Also taking vitamin D3. Has not been taking Lyrica; no side effects but felt it was not helping symptoms. Will not be able to get Effexor after May; presently getting it free through [employer] patient assistance program.  She also complains of right thumb tenderness, swelling, and stiffness that radiates to forearm. Has been under increased stress because her mother was hospitalized with sepsis. Now home. Taking Tramadol once daily; takes it more when she has to be more active. Takes Aleve also. Continues to have diffuse muscle aches, chronic low back pain, left sided buttock pain, bilateral knee and hip pains. Also has leg heaviness. Ambulates with cane. Pain worsens with prolonged standing and prolonged sitting. Takes Tramadol 50 mg 1 tab once daily for pain to make it last longer. Does not get out of her house much due both to physical pain and mild depression. Applied for disability last year but was denied.        Chronic Pain Review  Uzma Schmitt [facility] today to follow up on chronic pain.  We discussed her osteoarthritis, fibromyalgia, radiculopathy and spondylolisthesis that is affecting her back, bilateral hand, bilateral hip and bilateral knee and causing depression.  Significant changes since last visit: none.  She is  able to do her normal daily activities.  She reports the following adverse side effects: none.      Least pain over the last week has been 7/10.      Worst pain over the last week has been 10/10.      Opioid Risk Tool Reviewed: YES      Aberrant behaviors: None.        Urine Drug Screen: Last checked 9/26/17.      Controlled substance agreement on file: YES.          reviewed:yes      Pill count is consistent with her prescription: yes, based on  review, she did not bring in prescription bottles      Concomitant use of a benzodiazepine: no      Active daily MME is 15 mg.      Naloxone prescription not warranted.       ROS  Constitutional: negative for fevers, chills, night sweats; positive for fatigue  ENT: negative for sore throat, nasal congestion, ear pains, hoarseness  Respiratory: negative for cough, hemoptysis, wheezing; positive for mild dyspnea with exertion  CV: negative for chest pain, palpitations, lower extremity edema  GI: negative for heartburn, abd pain, nausea, vomiting, diarrhea, constipation  Genitourinary: negative for frequency, dysuria and hematuria  Integument: negative for rash and pruritus  Musculoskel: negative for muscle weakness; positive for myalgias, chronic low back pain  Neurological: negative for dizziness; positive for numbness in feet and hands, gait problems (ambulates with cane), and occ headaches  Behavl/Psych: positive or feelings of anxiety, depression     Patient Active Problem List   Diagnosis Code    HTN (hypertension) I10    Numbness of feet R20.0    Osteopenia M85.80    Fibromyalgia M79.7    Left sided sciatica M54.32    Depression with anxiety F41.8    Lumbar herniated disc M51.26    Primary osteoarthritis of left hip M16.12    Primary osteoarthritis of both knees M17.0    Degenerative lumbar disc M51.36    Morbid obesity with BMI of 50.0-59.9, adult (HCC) E66.01, Z68.43    High risk medication use Z79.899     Past Medical History:   Diagnosis Date    Depression with anxiety     Fibromyalgia     Hypertension     Morbid obesity ([facility] [address]     Osteoarthritis of knee 2016    R knee X-ray 7/5/16 ([facility]) mod to severe tibiofemoral arthritis, mod patellofemoral arthritis    Osteoarthritis of left hip 2015    Osteopenia      No Known Allergies    Current Outpatient Prescriptions   Medication Sig Dispense Refill    TURMERIC (CURCUMIN) by Does Not Apply route.  ascorbic acid, vitamin C, (VITAMIN C) 250 mg tablet Take 1,000 mg by mouth daily.  diclofenac (VOLTAREN) 1 % gel Apply  to affected area four (4) times daily.  traMADol (ULTRAM) 50 mg tablet TAKE ONE TABLET BY MOUTH EVERY 8 HOURS AS NEEDED FOR PAIN (MAX  DAILY  AMOUNT  150  MG) 45 Tab 0    venlafaxine-SR (EFFEXOR-XR) 150 mg capsule Take 1 Cap by mouth daily. Indications: major depressive disorder 90 Cap 3    OTHER Vitamin d with amino acids      benazepril (LOTENSIN) 40 mg tablet Take 1 Tab by mouth daily. 30 Tab 11    cholecalciferol (VITAMIN D3) 1,000 unit cap Take 5,000 Units by mouth daily. Plant based      pregabalin (LYRICA) 150 mg capsule Take 1 Cap by mouth two (2) times a day. Max Daily Amount: 300 mg. 180 Cap 3         PHYSICAL EXAM  Visit Vitals    /77    Pulse (!) 102    Temp 98.2 °F (36.8 °C) (Oral)    Resp 16    Ht 5' 4\" (1.626 m)    Wt 313 lb (142 kg)    LMP 08/01/2017    SpO2 97%    BMI 53.73 kg/m2       General: Morbidly obese, no distress. HEENT:  Head normocephalic/atraumatic, no scleral icterus  Neck: Supple. No carotid bruits, JVD, lymphadenopathy, or thyromegaly. Lungs:  Clear to ausculation bilaterally. Good air movement. Heart:  Regular rate and rhythm, normal S1 and S2, no murmur, gallop, or rub  Extremities: No clubbing, cyanosis. 1+ pitting bilateral LE edema especially at ankles and feet. Neurological: Alert and oriented. Psychiatric: Normal mood and affect. Behavior is normal.         ASSESSMENT AND PLAN    ICD-10-CM ICD-9-CM    1. Fibromyalgia M79.7 729.1 naproxen (NAPROSYN) 500 mg tablet   2. Essential hypertension I10 401.9    3. Left sided sciatica M54.32 724.3    4. Morbid obesity with BMI of 50.0-59.9, adult ([facility]) E66.01 278.01     Z68.43 V85.43    5.  Primary osteoarthritis of left hip M16.12 715.15    6. Primary osteoarthritis of both knees M17.0 715.16    7. Depression with anxiety F41.8 300.4    8. High risk medication use Z79.899 V58.69    9. Osteopenia of multiple sites M85.89 733.90        Diagnoses and all orders for this visit:    1. Fibromyalgia  -     naproxen (NAPROSYN) 500 mg tablet; Take 1 Tab by mouth two (2) times daily as needed. 2. Essential hypertension  Controlled, continue present management. 3. Left sided sciatica    4. Morbid obesity with BMI of 50.0-59.9, adult ([facility] [address]  Discussed the patient's BMI with her. The BMI follow up plan is as follows: dietary management education, guidance, and counseling; encourage exercise; monitor weight; prescribed dietary intake. Follow up BMI in 3 months. 5. Primary osteoarthritis of left hip    6. Primary osteoarthritis of both knees    7. Depression with anxiety    8. High risk medication use  Cannot afford compliance drug screen or labs today. 9. Osteopenia of multiple sites      Follow-up Disposition:  Return in about 3 months (around 6/27/2018), or if symptoms worsen or fail to improve, for HTN, depression, fibromyalgia. Provided patient and/or family with advanced directive information and answered pertinent questions. Encouraged patient to provide a copy of advanced directive to the office when available. I have discussed the diagnosis with the patient and the intended plan as seen in the above orders. Patient is in agreement. The patient has received an after-visit summary and questions were answered concerning future plans. I have discussed medication side effects and warnings with the patient as well.

## 2018-03-27 NOTE — MR AVS SNAPSHOT
[address] [place] [address] [phone] Patient: Diana Barcenas MRN: [id number] :1960 Visit Information Date & Time Provider Department Dept. Phone Encounter #  
 3/27/2018  2:20 PM MD [facility] [address] [place] [address] [id number] Follow-up Instructions Return in about 3 months (around 2018), or if symptoms worsen or fail to improve, for HTN, depression, fibromyalgia. Upcoming Health Maintenance Date Due  
 BREAST CANCER SCRN MAMMOGRAM 2017 PAP AKA CERVICAL CYTOLOGY 2018* FOBT Q 1 YEAR, 18+ 4/10/2018 DTaP/Tdap/Td series (2 - Td) 2027 COLONOSCOPY 2027 *Topic was postponed. The date shown is not the original due date. Allergies as of 3/27/2018  Review Complete On: 3/27/2018 By: Aida Garcia LPN No Known Allergies Current Immunizations  Reviewed on 2016 No immunizations on file. Not reviewed this visit You Were Diagnosed With   
  
 Codes Comments Fibromyalgia    -  Primary ICD-10-CM: M79.7 ICD-9-CM: 729.1 Essential hypertension     ICD-10-CM: I10 
ICD-9-CM: 401.9 Left sided sciatica     ICD-10-CM: M54.32 
ICD-9-CM: 724.3 Morbid obesity with BMI of 50.0-59.9, adult (HCC)     ICD-10-CM: E66.01, Z68.43 
ICD-9-CM: 278.01, V85.43 Primary osteoarthritis of left hip     ICD-10-CM: M16.12 
ICD-9-CM: 715.15 Primary osteoarthritis of both knees     ICD-10-CM: M17.0 ICD-9-CM: 715.16 Depression with anxiety     ICD-10-CM: F41.8 ICD-9-CM: 300.4 High risk medication use     ICD-10-CM: Z79.899 ICD-9-CM: V58.69 Vitals BP Pulse Temp Resp Height(growth percentile) Weight(growth percentile) 121/77 (!) 102 98.2 °F (36.8 °C) (Oral) 16 5' 4\" (1.626 m) 313 lb (142 kg) LMP SpO2 BMI OB Status Smoking Status 2017 97% 53.73 kg/m2 Unknown Never Smoker Vitals History BMI and BSA Data Body Mass Index Body Surface Area 53.73 kg/m 2 2.53 m 2 Preferred Pharmacy Pharmacy Name Phone [facility] [address] [facility], Shawna Guardado [phone] Your Updated Medication List  
  
   
This list is accurate as of 3/27/18  3:07 PM.  Always use your most recent med list.  
  
  
  
  
 benazepril 40 mg tablet Commonly known as:  LOTENSIN Take 1 Tab by mouth daily. cholecalciferol 1,000 unit Cap Commonly known as:  VITAMIN D3 Take 5,000 Units by mouth daily. Plant based CURCUMIN  
by Does Not Apply route. naproxen 500 mg tablet Commonly known as:  NAPROSYN Take 1 Tab by mouth two (2) times daily as needed. OTHER Vitamin d with amino acids  
  
 traMADol 50 mg tablet Commonly known as:  ULTRAM  
TAKE ONE TABLET BY MOUTH EVERY 8 HOURS AS NEEDED FOR PAIN (MAX  DAILY  AMOUNT  150  MG)  
  
 venlafaxine- mg capsule Commonly known as:  EFFEXOR-XR Take 1 Cap by mouth daily. Indications: major depressive disorder VITAMIN C 250 mg tablet Generic drug:  ascorbic acid (vitamin C) Take 1,000 mg by mouth daily. Prescriptions Sent to Pharmacy Refills  
 naproxen (NAPROSYN) 500 mg tablet 1 Sig: Take 1 Tab by mouth two (2) times daily as needed. Class: Normal  
 Pharmacy: [facility] [address] [place] [address], [address] [place] [address] #: [phone] Route: Oral  
  
Follow-up Instructions Return in about 3 months (around 6/27/2018), or if symptoms worsen or fail to improve, for HTN, depression, fibromyalgia. Patient Instructions Fibromyalgia: Care Instructions Your Care Instructions Fibromyalgia is a painful condition that is not completely understood by medical experts. The cause of fibromyalgia is not known. It can make you feel tired and ache all over.  It causes tender spots at specific points of the body that hurt only when you press on them. You may have trouble sleeping, as well as other symptoms. These problems can upset your work and home life. Symptoms tend to come and go, although they may never go away completely. Fibromyalgia does not harm your muscles, joints, or organs. Follow-up care is a key part of your treatment and safety. Be sure to make and go to all appointments, and call your doctor if you are having problems. It's also a good idea to know your test results and keep a list of the medicines you take. How can you care for yourself at home? · Exercise often. Walk, swim, or bike to help with pain and sleep problems and to make you feel better. · Try to get a good night's sleep. Go to bed and get up at the same time each day, whether you feel rested or not. Make sure you have a good mattress and pillow. · Reduce stress. Avoid things that cause you stress, if you can. If not, work at making them less stressful. Learn to use biofeedback, guided imagery, meditation, or other methods to relax. · Make healthy changes. Eat a balanced diet, quit smoking, and limit alcohol and caffeine. · Use a heating pad set on low or take warm baths or showers for pain. Using cold packs for up to 20 minutes at a time can also relieve pain. Put a thin cloth between the cold pack and your skin. A gentle massage might help too. · Be safe with medicines. Take your medicines exactly as prescribed. Call your doctor if you think you are having a problem with your medicine. Your doctor may talk to you about taking antidepressant medicines. These medicines may improve sleep, relieve pain, and in some cases treat depression. · Learn about fibromyalgia. This makes coping easier. Then, take an active role in your treatment. · Think about joining a support group with others who have fibromyalgia to learn more and get support. When should you call for help? Watch closely for changes in your health, and be sure to contact your doctor if: 
? · You feel sad, helpless, or hopeless; lose interest in things you used to enjoy; or have other symptoms of depression. ? · Your fibromyalgia symptoms get worse. Where can you learn more? Go to http://jac-jeff.info/. Enter V003 in the search box to learn more about \"Fibromyalgia: Care Instructions. \" Current as of: October 14, 2016 Content Version: 11.4 © 2770-4936 [employer]. Care instructions adapted under license by [employer] (which disclaims liability or warranty for this information). If you have questions about a medical condition or this instruction, always ask your healthcare professional. [address] any warranty or liability for your use of this information. Introducing [facility]! Dear Balwinder [place]: 
Thank you for requesting a Shot Stats account. Our records indicate that you already have an active Shot Stats account. You can access your account anytime at [email or web address] Did you know that you can access your hospital and ER discharge instructions at any time in Shot Stats? You can also review all of your test results from your hospital stay or ER visit. Additional Information If you have questions, please visit the Frequently Asked Questions section of the Shot Stats website at [email or web address] Remember, Shot Stats is NOT to be used for urgent needs. For medical emergencies, dial 911. Now available from your iPhone and Android! Please provide this summary of care documentation to your next provider. Your primary care clinician is listed as Nicolina Pallas. If you have any questions after today's visit, please call [phone].

## 2018-04-12 DIAGNOSIS — M79.7 FIBROMYALGIA: ICD-10-CM

## 2018-04-12 RX ORDER — TRAMADOL HYDROCHLORIDE 50 MG/1
TABLET ORAL
Qty: 45 TAB | Refills: 0 | Status: SHIPPED | OUTPATIENT
Start: 2018-04-12 | End: 2018-04-16 | Stop reason: SDUPTHER

## 2018-04-16 DIAGNOSIS — M79.7 FIBROMYALGIA: ICD-10-CM

## 2018-04-16 RX ORDER — TRAMADOL HYDROCHLORIDE 50 MG/1
TABLET ORAL
Qty: 45 TAB | Refills: 0 | Status: SHIPPED | OUTPATIENT
Start: 2018-04-16 | End: 2018-06-07 | Stop reason: SDUPTHER

## 2018-05-03 ENCOUNTER — APPOINTMENT (OUTPATIENT)
Dept: INTERNAL MEDICINE CLINIC | Facility: CLINIC | Age: 58
End: 2018-05-03

## 2018-05-03 DIAGNOSIS — Z79.899 HIGH RISK MEDICATION USE: ICD-10-CM

## 2018-05-08 LAB — DRUGS UR: NORMAL

## 2018-05-09 ENCOUNTER — TELEPHONE (OUTPATIENT)
Dept: INTERNAL MEDICINE CLINIC | Facility: CLINIC | Age: 58
End: 2018-05-09

## 2018-05-09 NOTE — TELEPHONE ENCOUNTER
Effexor order placed with [employer] patient assistance program at [phone] and spoke with Lauro Nicole and gave him updated information on our new office address. Order placed for patient # [id number], confirmation  number [id number]. Delivery expected in 7-10 days. Patient notified.

## 2018-06-07 DIAGNOSIS — M79.7 FIBROMYALGIA: ICD-10-CM

## 2018-06-08 RX ORDER — TRAMADOL HYDROCHLORIDE 50 MG/1
TABLET ORAL
Qty: 45 TAB | Refills: 0 | Status: SHIPPED | OUTPATIENT
Start: 2018-06-08 | End: 2018-07-31 | Stop reason: SDUPTHER

## 2018-06-08 NOTE — TELEPHONE ENCOUNTER
From: Kade Like  To: Nas Ferrer MD  Sent: 6/7/2018 11:17 AM EDT  Subject: Medication Renewal Request    Original authorizing provider: MD Jan Graff Carlos Alberto would like a refill of the following medications:  traMADol (ULTRAM) 50 mg tablet Nas Ferrer MD]    Preferred pharmacy: [address]:  Could you please call in a refill for my Tramadol as soon as you can so that i can pick it up when i go to [facility] today ? [facility] DR AYANNA PETIT says it will take 1 business day if i let them contact you for approval . I am planning to come by and get the Effexor today also .  Thank you

## 2018-06-21 ENCOUNTER — TELEPHONE (OUTPATIENT)
Dept: INTERNAL MEDICINE CLINIC | Facility: CLINIC | Age: 58
End: 2018-06-21

## 2018-06-21 ENCOUNTER — DOCUMENTATION ONLY (OUTPATIENT)
Dept: INTERNAL MEDICINE CLINIC | Facility: CLINIC | Age: 58
End: 2018-06-21

## 2018-06-21 NOTE — TELEPHONE ENCOUNTER
Pharmacist called and stated that Tramadol prescription called in is a day over the max amount that can be filled in the [facility]. He would like a call back to adjust.  Pt can be reached at [phone].

## 2018-06-26 ENCOUNTER — OFFICE VISIT (OUTPATIENT)
Dept: INTERNAL MEDICINE CLINIC | Facility: CLINIC | Age: 58
End: 2018-06-26

## 2018-06-26 VITALS
RESPIRATION RATE: 18 BRPM | DIASTOLIC BLOOD PRESSURE: 76 MMHG | SYSTOLIC BLOOD PRESSURE: 123 MMHG | BODY MASS INDEX: 50.02 KG/M2 | TEMPERATURE: 98.3 F | WEIGHT: 293 LBS | OXYGEN SATURATION: 97 % | HEART RATE: 105 BPM | HEIGHT: 64 IN

## 2018-06-26 DIAGNOSIS — I10 ESSENTIAL HYPERTENSION: Primary | ICD-10-CM

## 2018-06-26 DIAGNOSIS — Z13.220 SCREENING, LIPID: ICD-10-CM

## 2018-06-26 DIAGNOSIS — Z12.39 SCREENING FOR BREAST CANCER: ICD-10-CM

## 2018-06-26 DIAGNOSIS — M16.12 PRIMARY OSTEOARTHRITIS OF LEFT HIP: ICD-10-CM

## 2018-06-26 DIAGNOSIS — M17.0 PRIMARY OSTEOARTHRITIS OF BOTH KNEES: ICD-10-CM

## 2018-06-26 DIAGNOSIS — R25.2 LEG CRAMPS: ICD-10-CM

## 2018-06-26 DIAGNOSIS — M79.89 LEG SWELLING: ICD-10-CM

## 2018-06-26 DIAGNOSIS — M79.7 FIBROMYALGIA: ICD-10-CM

## 2018-06-26 DIAGNOSIS — F41.8 DEPRESSION WITH ANXIETY: ICD-10-CM

## 2018-06-26 DIAGNOSIS — Z12.11 SCREEN FOR COLON CANCER: ICD-10-CM

## 2018-06-26 DIAGNOSIS — M51.36 DEGENERATIVE LUMBAR DISC: ICD-10-CM

## 2018-06-26 DIAGNOSIS — E66.01 MORBID OBESITY WITH BMI OF 50.0-59.9, ADULT (HCC): ICD-10-CM

## 2018-06-26 DIAGNOSIS — M54.32 LEFT SIDED SCIATICA: ICD-10-CM

## 2018-06-26 DIAGNOSIS — E55.9 VITAMIN D DEFICIENCY: ICD-10-CM

## 2018-06-26 NOTE — MR AVS SNAPSHOT
[address] [place] [address] [phone] Patient: Leonidas Lopez MRN: [id number] :1960 Visit Information Date & Time Provider Department Dept. Phone Encounter #  
 2018  2:40 PM Ruthie Villa MD [facility] of [address] [place] [address] [id number] Follow-up Instructions Return in about 3 months (around 2018), or if symptoms worsen or fail to improve, for HTN, depression. Upcoming Health Maintenance Date Due  
 BREAST CANCER SCRN MAMMOGRAM 2017 FOBT Q 1 YEAR, 18+ 4/10/2018 PAP AKA CERVICAL CYTOLOGY 2018* Influenza Age 5 to Adult 2018 DTaP/Tdap/Td series (2 - Td) 2027 COLONOSCOPY 2027 *Topic was postponed. The date shown is not the original due date. Allergies as of 2018  Review Complete On: 2018 By: Ruthie Villa MD  
 No Known Allergies Current Immunizations  Reviewed on 2016 No immunizations on file. Not reviewed this visit You Were Diagnosed With   
  
 Codes Comments Essential hypertension    -  Primary ICD-10-CM: I10 
ICD-9-CM: 401.9 Depression with anxiety     ICD-10-CM: F41.8 ICD-9-CM: 300.4 Fibromyalgia     ICD-10-CM: M79.7 ICD-9-CM: 729.1 Left sided sciatica     ICD-10-CM: M54.32 
ICD-9-CM: 724.3 Primary osteoarthritis of left hip     ICD-10-CM: M16.12 
ICD-9-CM: 715.15 Primary osteoarthritis of both knees     ICD-10-CM: M17.0 ICD-9-CM: 715.16 Vitamin D deficiency     ICD-10-CM: E55.9 ICD-9-CM: 268.9 Morbid obesity with BMI of 50.0-59.9, adult (HCC)     ICD-10-CM: E66.01, Z68.43 
ICD-9-CM: 278.01, V85.43 Degenerative lumbar disc     ICD-10-CM: M51.36 
ICD-9-CM: 722.52 Leg cramps     ICD-10-CM: R25.2 ICD-9-CM: 729.82 Leg swelling     ICD-10-CM: M79.89 ICD-9-CM: 729.81 Screening, lipid     ICD-10-CM: T30.900 ICD-9-CM: V77.91   
 Screen for colon cancer     ICD-10-CM: Z12.11 ICD-9-CM: V76.51 Screening for breast cancer     ICD-10-CM: Z12.31 
ICD-9-CM: V76.10 Vitals BP Pulse Temp Resp Height(growth percentile) Weight(growth percentile) 123/76 (!) 105 98.3 °F (36.8 °C) (Oral) 18 5' 4\" (1.626 m) 320 lb (145.2 kg) SpO2 BMI OB Status Smoking Status 97% 54.93 kg/m2 Unknown Never Smoker Vitals History BMI and BSA Data Body Mass Index Body Surface Area 54.93 kg/m 2 2.56 m 2 Preferred Pharmacy Pharmacy Name Phone [facility] [address] [place] [address] [facility] [phone] Your Updated Medication List  
  
   
This list is accurate as of 6/26/18  3:41 PM.  Always use your most recent med list.  
  
  
  
  
 benazepril 40 mg tablet Commonly known as:  LOTENSIN Take 1 Tab by mouth daily. cholecalciferol 1,000 unit Cap Commonly known as:  VITAMIN D3 Take 5,000 Units by mouth daily. Plant based OTHER Vitamin d with amino acids  
  
 traMADol 50 mg tablet Commonly known as:  ULTRAM  
TAKE 1 TABLET BY MOUTH EVERY 8 HOURS AS NEEDED FOR PAIN  
  
 venlafaxine- mg capsule Commonly known as:  EFFEXOR-XR Take 1 Cap by mouth daily. Indications: major depressive disorder VITAMIN C 250 mg tablet Generic drug:  ascorbic acid (vitamin C) Take 1,000 mg by mouth daily. We Performed the Following CBC WITH AUTOMATED DIFF [33703 CPT(R)] HEMOGLOBIN A1C WITH EAG [47494 CPT(R)] LIPID PANEL [62719 CPT(R)] MAGNESIUM [id number] CPT(R)] METABOLIC PANEL, COMPREHENSIVE [42614 CPT(R)] TSH AND FREE T4 [23655 CPT(R)] VITAMIN D, 25 HYDROXY [id number] CPT(R)] Follow-up Instructions Return in about 3 months (around 9/26/2018), or if symptoms worsen or fail to improve, for HTN, depression. To-Do List   
 06/27/2018 ECHO:  2D ECHO COMPLETE ADULT (TTE) W OR WO CONTR   
  
 07/26/2018 Lab:  OCCULT BLOOD, IMMUNOASSAY (FIT)   
  
 08/23/2018 Imaging:  MARITO MAMMO BI SCREENING INCL CAD Patient Instructions Well Visit, Women 48 to 72: Care Instructions Your Care Instructions Physical exams can help you stay healthy. Your doctor has checked your overall health and may have suggested ways to take good care of yourself. He or she also may have recommended tests. At home, you can help prevent illness with healthy eating, regular exercise, and other steps. Follow-up care is a key part of your treatment and safety. Be sure to make and go to all appointments, and call your doctor if you are having problems. It's also a good idea to know your test results and keep a list of the medicines you take. How can you care for yourself at home? · Reach and stay at a healthy weight. This will lower your risk for many problems, such as obesity, diabetes, heart disease, and high blood pressure. · Get at least 30 minutes of exercise on most days of the week. Walking is a good choice. You also may want to do other activities, such as running, swimming, cycling, or playing tennis or team sports. · Do not smoke. Smoking can make health problems worse. If you need help quitting, talk to your doctor about stop-smoking programs and medicines. These can increase your chances of quitting for good. · Protect your skin from too much sun. When you're outdoors from 10 a.m. to 4 p.m., stay in the shade or cover up with clothing and a hat with a wide brim. Wear sunglasses that block UV rays. Even when it's cloudy, put broad-spectrum sunscreen (SPF 30 or higher) on any exposed skin. · See a dentist one or two times a year for checkups and to have your teeth cleaned. · Wear a seat belt in the car. · Limit alcohol to 1 drink a day. Too much alcohol can cause health problems. Follow your doctor's advice about when to have certain tests. These tests can spot problems early. · Cholesterol. Your doctor will tell you how often to have this done based on your age, family history, or other things that can increase your risk for heart attack and stroke. · Blood pressure. Have your blood pressure checked during a routine doctor visit. Your doctor will tell you how often to check your blood pressure based on your age, your blood pressure results, and other factors. · Mammogram. Ask your doctor how often you should have a mammogram, which is an X-ray of your breasts. A mammogram can spot breast cancer before it can be felt and when it is easiest to treat. · Pap test and pelvic exam. Ask your doctor how often you should have a Pap test. You may not need to have a Pap test as often as you used to. · Vision. Have your eyes checked every year or two or as often as your doctor suggests. Some experts recommend that you have yearly exams for glaucoma and other age-related eye problems starting at age 48. · Hearing. Tell your doctor if you notice any change in your hearing. You can have tests to find out how well you hear. · Diabetes. Ask your doctor whether you should have tests for diabetes. · Colon cancer. You should begin tests for colon cancer at age 48. You may have one of several tests. Your doctor will tell you how often to have tests based on your age and risk. Risks include whether you already had a precancerous polyp removed from your colon or whether your parents, sisters and brothers, or children have had colon cancer. · Thyroid disease. Talk to your doctor about whether to have your thyroid checked as part of a regular physical exam. Women have an increased chance of a thyroid problem. · Osteoporosis. You should begin tests for bone density at age 72. If you are younger than 72, ask your doctor whether you have factors that may increase your risk for this disease. You may want to have this test before age 72. · Heart attack and stroke risk. At least every 4 to 6 years, you should have your risk for heart attack and stroke assessed. Your doctor uses factors such as your age, blood pressure, cholesterol, and whether you smoke or have diabetes to show what your risk for a heart attack or stroke is over the next 10 years. When should you call for help? Watch closely for changes in your health, and be sure to contact your doctor if you have any problems or symptoms that concern you. Where can you learn more? Go to http://jac-jeff.info/. Enter G299 in the search box to learn more about \"Well Visit, Women 50 to 72: Care Instructions. \" Current as of: May 12, 2017 Content Version: 11.4 © 0812-7796 [employer]. Care instructions adapted under license by [employer] (which disclaims liability or warranty for this information). If you have questions about a medical condition or this instruction, always ask your healthcare professional. [place] [address] any warranty or liability for your use of this information. Introducing \[facility] [place] [facility]\"" & HEALTH SERVICES! Dear Barnet Heimlich: 
Thank you for requesting a [employer] account. Our records indicate that you already have an active [employer] account. You can access your account anytime at [email or web address] Did you know that you can access your hospital and ER discharge instructions at any time in [employer]? You can also review all of your test results from your hospital stay or ER visit. Additional Information If you have questions, please visit the Frequently Asked Questions section of the [employer] website at [email or web address] Remember, [employer] is NOT to be used for urgent needs. For medical emergencies, dial 911. Now available from your iPhone and Android! Please provide this summary of care documentation to your next provider. Your primary care clinician is listed as Sade Gan. If you have any questions after today's visit, please call [phone].

## 2018-06-26 NOTE — PROGRESS NOTES
CC:   Chief Complaint   Patient presents with    Hypertension       HISTORY OF PRESENT ILLNESS  Natalia Huynh is a 56 y.o. female. Presents for 3 month follow up evaluation. She has HTN, depression with anxiety, fibromyalgia, chronic pain, osteoarthritis at lumbar spine, morbid obesity, and numbness at feet. Today she complains of unchanged chronic diffuse muscle aches, chronic low back pain, left sided buttock pain, right-sided sciatica, bilateral knee and hip pains, cramping at legs, and sensation of heaviness at the legs. Ambulates with a cane. Sleeps in a recliner. Pain worsens with prolonged standing and prolonged sitting. Takes Tramadol 50 mg 1 tab once daily for pain. Lyrica did not help her symptoms. Does not get out of her house much due to physical pain and depression. Working reduced work hours. She applied for disability but was denied because she works part-time. Depression Review  Patient is seen for follow up of depression. Ongoing depressed mood, fatigue, anhedonia, and problems concentrating. Treatment includes Effexor. She denies recurrent thoughts of death and suicidal thoughts without plan. She experiences the following side effects from the treatment: none. Reports she will not be able to get Effexor after June; presently getting it free through [employer] patient assistance program.         Chronic Pain Review  Halle Schmitt RTC today to follow up on chronic pain.  We discussed her osteoarthritis, fibromyalgia, radiculopathy and spondylolisthesis that is affecting her back, bilateral hand, bilateral hip and bilateral knee and causing depression.  Significant changes since last visit: none.  She is  able to do her normal daily activities.  She reports the following adverse side effects: none.      Least pain over the last week has been 7/10.      Worst pain over the last week has been 10/10.      Opioid Risk Tool Reviewed: YES      Aberrant behaviors: None.        Urine Drug Screen: Last checked 5/3/18.      Controlled substance agreement on file: YES.          reviewed:yes      Pill count is consistent with her prescription: yes, based on  review, she did not bring in prescription bottles      Concomitant use of a benzodiazepine: no      Active daily MME is 15 mg.      Naloxone prescription not warranted.       ROS  Constitutional: negative for fevers, chills, night sweats; positive for fatigue  ENT: negative for sore throat, nasal congestion, ear pains, hoarseness  Respiratory: negative for cough, hemoptysis, wheezing; positive for dyspnea with exertion  CV: negative for chest pain, palpitations, lower extremity edema  GI: negative for heartburn, abd pain, nausea, vomiting, diarrhea, constipation  Genitourinary: negative for frequency, dysuria and hematuria  Integument: negative for rash and pruritus  Musculoskel: negative for muscle weakness; positive for myalgias, chronic low back pain  Neurological: negative for dizziness; positive for numbness in feet and hands, gait problems (ambulates with cane), and occ headaches  Behavl/Psych: positive or feelings of anxiety, depression       Patient Active Problem List   Diagnosis Code    HTN (hypertension) I10    Numbness of feet R20.0    Osteopenia M85.80    Fibromyalgia M79.7    Left sided sciatica M54.32    Depression with anxiety F41.8    Lumbar herniated disc M51.26    Primary osteoarthritis of left hip M16.12    Primary osteoarthritis of both knees M17.0    Degenerative lumbar disc M51.36    Morbid obesity with BMI of 50.0-59.9, adult (HCC) E66.01, Z68.43    High risk medication use Z79.899     Past Medical History:   Diagnosis Date    Depression with anxiety     Fibromyalgia     Hypertension     Morbid obesity ([facility] [address]     Osteoarthritis of knee 2016    R knee X-ray 7/5/16 ([facility]) mod to severe tibiofemoral arthritis, mod patellofemoral arthritis    Osteoarthritis of left hip 2015  Osteopenia      No Known Allergies    Current Outpatient Prescriptions   Medication Sig Dispense Refill    traMADol (ULTRAM) 50 mg tablet TAKE 1 TABLET BY MOUTH EVERY 8 HOURS AS NEEDED FOR PAIN 45 Tab 0    ascorbic acid, vitamin C, (VITAMIN C) 250 mg tablet Take 1,000 mg by mouth daily.  venlafaxine-SR (EFFEXOR-XR) 150 mg capsule Take 1 Cap by mouth daily. Indications: major depressive disorder 90 Cap 3    OTHER Vitamin d with amino acids      benazepril (LOTENSIN) 40 mg tablet Take 1 Tab by mouth daily. 30 Tab 11    cholecalciferol (VITAMIN D3) 1,000 unit cap Take 5,000 Units by mouth daily. Plant based           PHYSICAL EXAM  Visit Vitals    /76    Pulse (!) 105    Temp 98.3 °F (36.8 °C) (Oral)    Resp 18    Ht 5' 4\" (1.626 m)    Wt 320 lb (145.2 kg)    SpO2 97%    BMI 54.93 kg/m2       General: Morbidly obese, no distress. HEENT:  Head normocephalic/atraumatic, no scleral icterus  Lungs:  Clear to ausculation bilaterally. Good air movement. Heart:  Tachycardic, regular rhythm, normal S1 and S2, no murmur, gallop, or rub  Extremities: No clubbing, cyanosis. 1-2+ pitting bilateral LE edema especially at ankles and feet. Neurological: Alert and oriented. Positive bilateral SLR. Decreased MS at both LE's. Psychiatric: Normal mood and affect. Behavior is normal.         ASSESSMENT AND PLAN    ICD-10-CM ICD-9-CM    1. Essential hypertension T81 372.0 METABOLIC PANEL, COMPREHENSIVE      CBC WITH AUTOMATED DIFF      HEMOGLOBIN A1C WITH EAG   2. Depression with anxiety F41.8 300.4 TSH AND FREE T4   3. Fibromyalgia M79.7 729.1    4. Left sided sciatica M54.32 724.3    5. Primary osteoarthritis of left hip M16.12 715.15    6. Primary osteoarthritis of both knees M17.0 715.16    7. Vitamin D deficiency E55.9 268.9 VITAMIN D, 25 HYDROXY   8. Morbid obesity with BMI of 50.0-59.9, adult (HCC) E66.01 278.01     Z68.43 V85.43    9. Degenerative lumbar disc M51.36 722.52    10.  Leg cramps R25.2 729.82 MAGNESIUM   11. Leg swelling M79.89 729.81 2D ECHO COMPLETE ADULT (TTE) W OR WO CONTR   12. Screening, lipid Z13.220 V77.91 LIPID PANEL   13. Screen for colon cancer Z12.11 V76.51 OCCULT BLOOD, IMMUNOASSAY (FIT)   14. Screening for breast cancer Z12.31 V76.10 MARITO MAMMO BI SCREENING INCL CAD     Diagnoses and all orders for this visit:    1. Essential hypertension  Controlled on benazepril 40 mg daily.  -     METABOLIC PANEL, COMPREHENSIVE  -     CBC WITH AUTOMATED DIFF  -     HEMOGLOBIN A1C WITH EAG    2. Depression with anxiety  Consider changing to fluoxetine 40 mg daily once she can no longer get Effexor. It is on the [facility] $4 list.  -     TSH AND FREE T4    3. Fibromyalgia  Continue Tramadol and Aleve. Consider adding amitriptyline 50 mg nightly (is on [facility] $4 list). This is a chronic problem that is not changed. Per review of available records and patients , there are not sign of overuse, misuse, diversion, or concerning side effects. Today we reviewed: the goals of treatment (improve functionality, quality of life, and pain)  The following changes were made to the patients current treatment plan: none. 4. Left sided sciatica    5. Primary osteoarthritis of left hip  Continue Tramadol and Aleve. 6. Primary osteoarthritis of both knees  Continue Tramadol and Aleve. 7. Vitamin D deficiency  -     VITAMIN D, 25 HYDROXY    8. Morbid obesity with BMI of 50.0-59.9, adult ([facility] [address]  Discussed the patient's BMI with her. The BMI follow up plan is as follows: dietary management education, guidance, and counseling; encourage exercise; monitor weight; prescribed dietary intake. Follow up BMI in 3 months. 9. Degenerative lumbar disc    10. Leg cramps  -     MAGNESIUM    11. Leg swelling  Rule out CHF. -     2D ECHO COMPLETE ADULT (TTE) W OR WO CONTR; Future    12. Screening, lipid  -     LIPID PANEL    13. Screen for colon cancer  -     OCCULT BLOOD, IMMUNOASSAY (FIT); Future    14.  Screening for breast cancer  -     [facility] MAMMO BI SCREENING INCL CAD; Future      Follow-up Disposition:  Return in about 3 months (around 9/26/2018), or if symptoms worsen or fail to improve, for HTN, depression. Provided patient and/or family with advanced directive information and answered pertinent questions. Encouraged patient to provide a copy of advanced directive to the office when available. I have discussed the diagnosis with the patient and the intended plan as seen in the above orders. Patient is in agreement. The patient has received an after-visit summary and questions were answered concerning future plans. I have discussed medication side effects and warnings with the patient as well.

## 2018-06-26 NOTE — PROGRESS NOTES
Hernan Jang  Identified pt with two pt identifiers(name and ). Chief Complaint   Patient presents with    Hypertension       1. Have you been to the ER, urgent care clinic since your last visit? Hospitalized since your last visit? NO    2. Have you seen or consulted any other health care providers outside of the [address] [place] [address] since your last visit? Include any pap smears or colon screening. NO    PHQ 9 performed. Today's provider has been notified of reason for visit, vitals and flowsheets obtained on patients.      Patient received paperwork for advance directive during previous visit but has not completed at this time     Reviewed record In preparation for visit, huddled with provider and have obtained necessary documentation      Health Maintenance Due   Topic    BREAST CANCER SCRN MAMMOGRAM     FOBT Q 1 YEAR, 18+        Wt Readings from Last 3 Encounters:   18 320 lb (145.2 kg)   18 313 lb (142 kg)   17 315 lb (142.9 kg)     Temp Readings from Last 3 Encounters:   18 98.3 °F (36.8 °C) (Oral)   18 98.2 °F (36.8 °C) (Oral)   17 98.7 °F (37.1 °C) (Oral)     BP Readings from Last 3 Encounters:   18 123/76   18 121/77   17 149/68     Pulse Readings from Last 3 Encounters:   18 (!) 105   18 (!) 102   17 (!) 101     Vitals:    18 1456 18 1459   BP: 147/79 123/76   Pulse: (!) 105    Resp: 18    Temp: 98.3 °F (36.8 °C)    TempSrc: Oral    SpO2: 97%    Weight: 320 lb (145.2 kg)    Height: 5' 4\" (1.626 m)    PainSc:   8          Learning Assessment:  :     Learning Assessment 2015 3/26/2014   PRIMARY LEARNER Patient Patient   HIGHEST LEVEL OF EDUCATION - PRIMARY LEARNER  GRADUATED HIGH SCHOOL OR GED GRADUATED HIGH SCHOOL OR GED   BARRIERS PRIMARY LEARNER NONE NONE   CO-LEARNER CAREGIVER No No   PRIMARY LANGUAGE ENGLISH ENGLISH   LEARNER PREFERENCE PRIMARY LISTENING VIDEOS   ANSWERED BY patient patient RELATIONSHIP SELF SELF       Depression Screening:  :     PHQ over the last two weeks 6/26/2018   Little interest or pleasure in doing things Nearly every day   Feeling down, depressed or hopeless Nearly every day   Total Score PHQ 2 6   Trouble falling or staying asleep, or sleeping too much Not at all   Feeling tired or having little energy Nearly every day   Poor appetite or overeating Nearly every day   Feeling bad about yourself - or that you are a failure or have let yourself or your family down Several days   Trouble concentrating on things such as school, work, reading or watching TV Nearly every day   Moving or speaking so slowly that other people could have noticed; or the opposite being so fidgety that others notice Not at all   Thoughts of being better off dead, or hurting yourself in some way Not at all   PHQ 9 Score 16       Fall Risk Assessment:  :     No flowsheet data found. Abuse Screening:  :     No flowsheet data found. ADL Screening:  :     ADL Assessment 3/27/2018   Feeding yourself No Help Needed   Getting from bed to chair No Help Needed   Getting dressed No Help Needed   Bathing or showering No Help Needed   Walk across the room (includes cane/walker) Help Needed   Using the telphone No Help Needed   Taking your medications No Help Needed   Preparing meals No Help Needed   Managing money (expenses/bills) No Help Needed   Moderately strenuous housework (laundry) No Help Needed   Shopping for personal items (toiletries/medicines) No Help Needed   Shopping for groceries No Help Needed   Driving No Help Needed   Climbing a flight of stairs No Help Needed   Getting to places beyond walking distances Help Needed     Medication reconciliation up to date and corrected with patient at this time.

## 2018-06-26 NOTE — PATIENT INSTRUCTIONS
Well Visit, Women 48 to 72: Care Instructions  Your Care Instructions    Physical exams can help you stay healthy. Your doctor has checked your overall health and may have suggested ways to take good care of yourself. He or she also may have recommended tests. At home, you can help prevent illness with healthy eating, regular exercise, and other steps. Follow-up care is a key part of your treatment and safety. Be sure to make and go to all appointments, and call your doctor if you are having problems. It's also a good idea to know your test results and keep a list of the medicines you take. How can you care for yourself at home? · Reach and stay at a healthy weight. This will lower your risk for many problems, such as obesity, diabetes, heart disease, and high blood pressure. · Get at least 30 minutes of exercise on most days of the week. Walking is a good choice. You also may want to do other activities, such as running, swimming, cycling, or playing tennis or team sports. · Do not smoke. Smoking can make health problems worse. If you need help quitting, talk to your doctor about stop-smoking programs and medicines. These can increase your chances of quitting for good. · Protect your skin from too much sun. When you're outdoors from 10 a.m. to 4 p.m., stay in the shade or cover up with clothing and a hat with a wide brim. Wear sunglasses that block UV rays. Even when it's cloudy, put broad-spectrum sunscreen (SPF 30 or higher) on any exposed skin. · See a dentist one or two times a year for checkups and to have your teeth cleaned. · Wear a seat belt in the car. · Limit alcohol to 1 drink a day. Too much alcohol can cause health problems. Follow your doctor's advice about when to have certain tests. These tests can spot problems early. · Cholesterol.  Your doctor will tell you how often to have this done based on your age, family history, or other things that can increase your risk for heart attack and stroke. · Blood pressure. Have your blood pressure checked during a routine doctor visit. Your doctor will tell you how often to check your blood pressure based on your age, your blood pressure results, and other factors. · Mammogram. Ask your doctor how often you should have a mammogram, which is an X-ray of your breasts. A mammogram can spot breast cancer before it can be felt and when it is easiest to treat. · Pap test and pelvic exam. Ask your doctor how often you should have a Pap test. You may not need to have a Pap test as often as you used to. · Vision. Have your eyes checked every year or two or as often as your doctor suggests. Some experts recommend that you have yearly exams for glaucoma and other age-related eye problems starting at age 48. · Hearing. Tell your doctor if you notice any change in your hearing. You can have tests to find out how well you hear. · Diabetes. Ask your doctor whether you should have tests for diabetes. · Colon cancer. You should begin tests for colon cancer at age 48. You may have one of several tests. Your doctor will tell you how often to have tests based on your age and risk. Risks include whether you already had a precancerous polyp removed from your colon or whether your parents, sisters and brothers, or children have had colon cancer. · Thyroid disease. Talk to your doctor about whether to have your thyroid checked as part of a regular physical exam. Women have an increased chance of a thyroid problem. · Osteoporosis. You should begin tests for bone density at age 72. If you are younger than 72, ask your doctor whether you have factors that may increase your risk for this disease. You may want to have this test before age 72. · Heart attack and stroke risk. At least every 4 to 6 years, you should have your risk for heart attack and stroke assessed.  Your doctor uses factors such as your age, blood pressure, cholesterol, and whether you smoke or have diabetes to show what your risk for a heart attack or stroke is over the next 10 years. When should you call for help? Watch closely for changes in your health, and be sure to contact your doctor if you have any problems or symptoms that concern you. Where can you learn more? Go to http://jac-jeff.info/. Enter N352 in the search box to learn more about \"Well Visit, Women 50 to 72: Care Instructions. \"  Current as of: May 12, 2017  Content Version: 11.4  © 3961-1637 Healthwise, Incorporated. Care instructions adapted under license by [employer] (which disclaims liability or warranty for this information). If you have questions about a medical condition or this instruction, always ask your healthcare professional. [address] any warranty or liability for your use of this information.

## 2018-06-27 LAB
25(OH)D3+25(OH)D2 SERPL-MCNC: 36.5 NG/ML (ref 30–100)
ALBUMIN SERPL-MCNC: 4.2 G/DL (ref 3.5–5.5)
ALBUMIN/GLOB SERPL: 1.5 {RATIO} (ref 1.2–2.2)
ALP SERPL-CCNC: 99 IU/L (ref 39–117)
ALT SERPL-CCNC: 17 IU/L (ref 0–32)
AST SERPL-CCNC: 18 IU/L (ref 0–40)
BASOPHILS # BLD AUTO: 0 X10E3/UL (ref 0–0.2)
BASOPHILS NFR BLD AUTO: 0 %
BILIRUB SERPL-MCNC: 0.2 MG/DL (ref 0–1.2)
BUN SERPL-MCNC: 11 MG/DL (ref 6–24)
BUN/CREAT SERPL: 18 (ref 9–23)
CALCIUM SERPL-MCNC: 9.9 MG/DL (ref 8.7–10.2)
CHLORIDE SERPL-SCNC: 100 MMOL/L (ref 96–106)
CHOLEST SERPL-MCNC: 209 MG/DL (ref 100–199)
CO2 SERPL-SCNC: 22 MMOL/L (ref 20–29)
CREAT SERPL-MCNC: 0.62 MG/DL (ref 0.57–1)
EOSINOPHIL # BLD AUTO: 0 X10E3/UL (ref 0–0.4)
EOSINOPHIL NFR BLD AUTO: 0 %
ERYTHROCYTE [DISTWIDTH] IN BLOOD BY AUTOMATED COUNT: 14.4 % (ref 12.3–15.4)
EST. AVERAGE GLUCOSE BLD GHB EST-MCNC: 114 MG/DL
GLOBULIN SER CALC-MCNC: 2.8 G/DL (ref 1.5–4.5)
GLUCOSE SERPL-MCNC: 80 MG/DL (ref 65–99)
HBA1C MFR BLD: 5.6 % (ref 4.8–5.6)
HCT VFR BLD AUTO: 40.1 % (ref 34–46.6)
HDLC SERPL-MCNC: 60 MG/DL
HGB BLD-MCNC: 13.1 G/DL (ref 11.1–15.9)
IMM GRANULOCYTES # BLD: 0 X10E3/UL (ref 0–0.1)
IMM GRANULOCYTES NFR BLD: 0 %
LDLC SERPL CALC-MCNC: 126 MG/DL (ref 0–99)
LYMPHOCYTES # BLD AUTO: 2.4 X10E3/UL (ref 0.7–3.1)
LYMPHOCYTES NFR BLD AUTO: 33 %
MAGNESIUM SERPL-MCNC: 1.8 MG/DL (ref 1.6–2.3)
MCH RBC QN AUTO: 27 PG (ref 26.6–33)
MCHC RBC AUTO-ENTMCNC: 32.7 G/DL (ref 31.5–35.7)
MCV RBC AUTO: 83 FL (ref 79–97)
MONOCYTES # BLD AUTO: 0.5 X10E3/UL (ref 0.1–0.9)
MONOCYTES NFR BLD AUTO: 7 %
NEUTROPHILS # BLD AUTO: 4.4 X10E3/UL (ref 1.4–7)
NEUTROPHILS NFR BLD AUTO: 60 %
PLATELET # BLD AUTO: 355 X10E3/UL (ref 150–379)
POTASSIUM SERPL-SCNC: 4.5 MMOL/L (ref 3.5–5.2)
PROT SERPL-MCNC: 7 G/DL (ref 6–8.5)
RBC # BLD AUTO: 4.85 X10E6/UL (ref 3.77–5.28)
SODIUM SERPL-SCNC: 140 MMOL/L (ref 134–144)
T4 FREE SERPL-MCNC: 1.02 NG/DL (ref 0.82–1.77)
TRIGL SERPL-MCNC: 114 MG/DL (ref 0–149)
TSH SERPL DL<=0.005 MIU/L-ACNC: 1.56 UIU/ML (ref 0.45–4.5)
VLDLC SERPL CALC-MCNC: 23 MG/DL (ref 5–40)
WBC # BLD AUTO: 7.3 X10E3/UL (ref 3.4–10.8)

## 2018-08-02 ENCOUNTER — HOSPITAL ENCOUNTER (OUTPATIENT)
Dept: NON INVASIVE DIAGNOSTICS | Age: 58
Discharge: HOME | End: 2018-08-02
Attending: INTERNAL MEDICINE
Payer: SUBSIDIZED

## 2018-08-02 DIAGNOSIS — M79.89 LEG SWELLING: ICD-10-CM

## 2018-08-02 PROCEDURE — 93306 TTE W/DOPPLER COMPLETE: CPT

## 2018-08-10 NOTE — PROGRESS NOTES
Your echocardiogram returned normal. It showed your heart is pumping well. Your leg swelling is not due to heart disease. It is due to poor circulation in the veins.

## 2018-08-17 LAB — DRUGS UR: NORMAL

## 2018-08-21 NOTE — PROGRESS NOTES
Your urine drug screen returned good, but it did not show Tramadol in your system. Had you run out or are you taking it only as needed?

## 2018-09-11 ENCOUNTER — TELEPHONE (OUTPATIENT)
Dept: INTERNAL MEDICINE CLINIC | Facility: CLINIC | Age: 58
End: 2018-09-11

## 2018-09-11 NOTE — TELEPHONE ENCOUNTER
Prescription for tramadol 50 mg tab#45 no refills called to [address] [place] [address] per written order of Dr Da Escalona.   Prescription left on pharmacy voice mail at 11:51 am.

## 2018-09-18 ENCOUNTER — OFFICE VISIT (OUTPATIENT)
Dept: INTERNAL MEDICINE CLINIC | Facility: CLINIC | Age: 58
End: 2018-09-18

## 2018-09-18 VITALS
TEMPERATURE: 98.8 F | RESPIRATION RATE: 18 BRPM | BODY MASS INDEX: 50.02 KG/M2 | SYSTOLIC BLOOD PRESSURE: 133 MMHG | HEIGHT: 64 IN | OXYGEN SATURATION: 95 % | DIASTOLIC BLOOD PRESSURE: 82 MMHG | HEART RATE: 107 BPM | WEIGHT: 293 LBS

## 2018-09-18 DIAGNOSIS — E66.01 MORBID OBESITY WITH BMI OF 50.0-59.9, ADULT (HCC): ICD-10-CM

## 2018-09-18 DIAGNOSIS — M16.12 PRIMARY OSTEOARTHRITIS OF LEFT HIP: ICD-10-CM

## 2018-09-18 DIAGNOSIS — I10 ESSENTIAL HYPERTENSION: Primary | ICD-10-CM

## 2018-09-18 DIAGNOSIS — M79.7 FIBROMYALGIA: ICD-10-CM

## 2018-09-18 DIAGNOSIS — Z12.39 SCREENING FOR BREAST CANCER: ICD-10-CM

## 2018-09-18 DIAGNOSIS — Z12.11 SCREENING FOR COLON CANCER: ICD-10-CM

## 2018-09-18 DIAGNOSIS — F32.1 MODERATE MAJOR DEPRESSION (HCC): ICD-10-CM

## 2018-09-18 DIAGNOSIS — Z12.4 SCREENING FOR CERVICAL CANCER: ICD-10-CM

## 2018-09-18 DIAGNOSIS — I89.0 LYMPHEDEMA: ICD-10-CM

## 2018-09-18 NOTE — PROGRESS NOTES
Quincy Jiménez  Identified pt with two pt identifiers(name and ). Chief Complaint   Patient presents with    Hypertension    Depression       1. Have you been to the ER, urgent care clinic since your last visit? Hospitalized since your last visit? NO    2. Have you seen or consulted any other providers? Declines flu vaccine  Still has FIT test.  Today's provider has been notified of reason for visit, vitals and flowsheets obtained on patients.      Patient received paperwork for advance directive during previous visit but has not completed at this time     Reviewed record In preparation for visit, huddled with provider and have obtained necessary documentation      Health Maintenance Due   Topic    BREAST CANCER SCRN MAMMOGRAM     FOBT Q 1 YEAR, 18+     Influenza Age 5 to Adult        Wt Readings from Last 3 Encounters:   18 322 lb (146.1 kg)   18 320 lb (145.2 kg)   18 313 lb (142 kg)     Temp Readings from Last 3 Encounters:   18 98.8 °F (37.1 °C) (Oral)   18 98.3 °F (36.8 °C) (Oral)   18 98.2 °F (36.8 °C) (Oral)     BP Readings from Last 3 Encounters:   18 133/82   18 123/76   18 121/77     Pulse Readings from Last 3 Encounters:   18 (!) 107   18 (!) 105   18 (!) 102     Vitals:    18 1420   BP: 133/82   Pulse: (!) 107   Resp: 18   Temp: 98.8 °F (37.1 °C)   TempSrc: Oral   SpO2: 95%   Weight: 322 lb (146.1 kg)   Height: 5' 4\" (1.626 m)   PainSc:   8         Learning Assessment:  :     Learning Assessment 2015 3/26/2014   PRIMARY LEARNER Patient Patient   HIGHEST LEVEL OF EDUCATION - PRIMARY LEARNER  GRADUATED HIGH SCHOOL OR GED GRADUATED HIGH SCHOOL OR GED   BARRIERS PRIMARY LEARNER NONE NONE   CO-LEARNER CAREGIVER No No   PRIMARY LANGUAGE ENGLISH ENGLISH   LEARNER PREFERENCE PRIMARY LISTENING VIDEOS   ANSWERED BY patient patient   RELATIONSHIP SELF SELF       Depression Screening:  :     PHQ over the last two weeks 6/26/2018   Little interest or pleasure in doing things Nearly every day   Feeling down, depressed, irritable, or hopeless Nearly every day   Total Score PHQ 2 6   Trouble falling or staying asleep, or sleeping too much Not at all   Feeling tired or having little energy Nearly every day   Poor appetite, weight loss, or overeating Nearly every day   Feeling bad about yourself - or that you are a failure or have let yourself or your family down Several days   Trouble concentrating on things such as school, work, reading, or watching TV Nearly every day   Moving or speaking so slowly that other people could have noticed; or the opposite being so fidgety that others notice Not at all   Thoughts of being better off dead, or hurting yourself in some way Not at all   PHQ 9 Score 16       Fall Risk Assessment:  :     No flowsheet data found. Abuse Screening:  :     No flowsheet data found. ADL Screening:  :     ADL Assessment 3/27/2018   Feeding yourself No Help Needed   Getting from bed to chair No Help Needed   Getting dressed No Help Needed   Bathing or showering No Help Needed   Walk across the room (includes cane/walker) Help Needed   Using the telphone No Help Needed   Taking your medications No Help Needed   Preparing meals No Help Needed   Managing money (expenses/bills) No Help Needed   Moderately strenuous housework (laundry) No Help Needed   Shopping for personal items (toiletries/medicines) No Help Needed   Shopping for groceries No Help Needed   Driving No Help Needed   Climbing a flight of stairs No Help Needed   Getting to places beyond walking distances Help Needed                 Medication reconciliation up to date and corrected with patient at this time.

## 2018-09-18 NOTE — MR AVS SNAPSHOT
[address] [address] [phone] Patient: Judy Hylton MRN: [id number] :1960 Visit Information Date & Time Provider Department Dept. Phone Encounter #  
 2018  2:20 PM Day Pack MD [facility] Internal Medicine [address] [id number] Follow-up Instructions Return in about 4 months (around 2019), or if symptoms worsen or fail to improve, for Fibromyalgia, OA, depression. Upcoming Health Maintenance Date Due  
 BREAST CANCER SCRN MAMMOGRAM 2017 FOBT Q 1 YEAR, 18+ 4/10/2018 PAP AKA CERVICAL CYTOLOGY 2018* Influenza Age 5 to Adult 2019* DTaP/Tdap/Td series (2 - Td) 2027 COLONOSCOPY 2027 *Topic was postponed. The date shown is not the original due date. Allergies as of 2018  Review Complete On: 2018 By: Day Pack MD  
 No Known Allergies Current Immunizations  Reviewed on 2016 No immunizations on file. Not reviewed this visit You Were Diagnosed With   
  
 Codes Comments Essential hypertension    -  Primary ICD-10-CM: I10 
ICD-9-CM: 401.9 Fibromyalgia     ICD-10-CM: M79.7 ICD-9-CM: 729.1 Primary osteoarthritis of left hip     ICD-10-CM: M16.12 
ICD-9-CM: 715.15 Moderate major depression (HCC)     ICD-10-CM: F32.1 ICD-9-CM: 296.22 Morbid obesity with BMI of 50.0-59.9, adult (HCC)     ICD-10-CM: E66.01, Z68.43 
ICD-9-CM: 278.01, V85.43 Lymphedema     ICD-10-CM: I89.0 ICD-9-CM: 996.6 Screening for cervical cancer     ICD-10-CM: Z12.4 ICD-9-CM: V76.2 Screening for colon cancer     ICD-10-CM: Z12.11 ICD-9-CM: V76.51 Screening for breast cancer     ICD-10-CM: Z12.31 
ICD-9-CM: V76.10 Vitals BP Pulse Temp Resp Height(growth percentile) Weight(growth percentile)  133/82 (BP 1 Location: Left arm, BP Patient Position: Sitting) (!) 107 98.8 °F (37.1 °C) (Oral) 18 5' 4\" (1.626 m) 322 lb (146.1 kg) SpO2 BMI OB Status Smoking Status 95% 55.27 kg/m2 Unknown Never Smoker BMI and BSA Data Body Mass Index Body Surface Area  
 55.27 kg/m 2 2.57 m 2 Preferred Pharmacy Pharmacy Name Phone [facility] [address] [place] [address] Dr Ro[facility], [facility]. [phone] Your Updated Medication List  
  
   
This list is accurate as of 9/18/18  3:27 PM.  Always use your most recent med list.  
  
  
  
  
 benazepril 40 mg tablet Commonly known as:  LOTENSIN Take 1 Tab by mouth daily. For blood pressure. cholecalciferol 1,000 unit Cap Commonly known as:  VITAMIN D3 Take 5,000 Units by mouth daily. Plant based  
  
 traMADol 50 mg tablet Commonly known as:  ULTRAM  
TAKE 1 TABLET BY MOUTH EVERY 8 HOURS AS NEEDED FOR PAIN  
  
 venlafaxine- mg capsule Commonly known as:  EFFEXOR-XR Take 1 Cap by mouth daily. Indications: major depressive disorder VITAMIN C 250 mg tablet Generic drug:  ascorbic acid (vitamin C) Take 1,000 mg by mouth daily. We Performed the Following REFERRAL TO GYNECOLOGY [REF30 Custom] Comments:  
 Screening for cervical cancer. REFERRAL TO LYMPHEDEMA CLINIC [ETK119 Custom] Comments:  
 Chronic lymphedema management. [address] [place] [address] [facility] [address] Phone: [phone] Fax: [phone] Follow-up Instructions Return in about 4 months (around 1/18/2019), or if symptoms worsen or fail to improve, for Fibromyalgia, OA, depression. Referral Information Referral ID Referred By Referred To  
  
 [id number] [facility], [address] [facility], [place]   
   [address] [place] [address] [address] [facility], [address] Phone: [phone] Fax: [phone] Visits Status Start Date End Date 1 New Request 9/18/18 9/18/19 If your referral has a status of pending review or denied, additional information will be sent to support the outcome of this decision. Referral ID Referred By Referred To  
 [id number] Marco Antonio Sadler Not Available Visits Status Start Date End Date 1 New Request 9/18/18 9/18/19 If your referral has a status of pending review or denied, additional information will be sent to support the outcome of this decision. Patient Instructions Recovering From Depression: Care Instructions Your Care Instructions Taking good care of yourself is important as you recover from depression. In time, your symptoms will fade as your treatment takes hold. Do not give up. Instead, focus your energy on getting better. Your mood will improve. It just takes some time. Focus on things that can help you feel better, such as being with friends and family, eating well, and getting enough rest. But take things slowly. Do not do too much too soon. You will begin to feel better gradually. Follow-up care is a key part of your treatment and safety. Be sure to make and go to all appointments, and call your doctor if you are having problems. It's also a good idea to know your test results and keep a list of the medicines you take. How can you care for yourself at home? Be realistic · If you have a large task to do, break it up into smaller steps you can handle, and just do what you can. · You may want to put off important decisions until your depression has lifted. If you have plans that will have a major impact on your life, such as marriage, divorce, or a job change, try to wait a bit. Talk it over with friends and loved ones who can help you look at the overall picture first. 
· Reaching out to people for help is important. Do not isolate yourself. Let your family and friends help you. Find someone you can trust and confide in, and talk to that person. · Be patient, and be kind to yourself. Remember that depression is not your fault and is not something you can overcome with willpower alone. Treatment is necessary for depression, just like for any other illness. Feeling better takes time, and your mood will improve little by little. Stay active · Stay busy and get outside. Take a walk, or try some other light exercise. · Talk with your doctor about an exercise program. Exercise can help with mild depression. · Go to a movie or concert. Take part in a Anglican activity or other social gathering. Go to a SeeWhy game. · Ask a friend to have dinner with you. Take care of yourself · Eat a balanced diet with plenty of fresh fruits and vegetables, whole grains, and lean protein. If you have lost your appetite, eat small snacks rather than large meals. · Avoid drinking alcohol or using illegal drugs. Do not take medicines that have not been prescribed for you. They may interfere with medicines you may be taking for depression, or they may make your depression worse. · Take your medicines exactly as they are prescribed. You may start to feel better within 1 to 3 weeks of taking antidepressant medicine. But it can take as many as 6 to 8 weeks to see more improvement. If you have questions or concerns about your medicines, or if you do not notice any improvement by 3 weeks, talk to your doctor. · If you have any side effects from your medicine, tell your doctor. Antidepressants can make you feel tired, dizzy, or nervous. Some people have dry mouth, constipation, headaches, sexual problems, or diarrhea. Many of these side effects are mild and will go away on their own after you have been taking the medicine for a few weeks. Some may last longer. Talk to your doctor if side effects are bothering you too much. You might be able to try a different medicine. · Get enough sleep. If you have problems sleeping: ¨ Go to bed at the same time every night, and get up at the same time every morning. ¨ Keep your bedroom dark and quiet. ¨ Do not exercise after 5:00 p.m. ¨ Avoid drinks with caffeine after 5:00 p.m. · Avoid sleeping pills unless they are prescribed by the doctor treating your depression. Sleeping pills may make you groggy during the day, and they may interact with other medicine you are taking. · If you have any other illnesses, such as diabetes, heart disease, or high blood pressure, make sure to continue with your treatment. Tell your doctor about all of the medicines you take, including those with or without a prescription. · Keep the numbers for these national suicide hotlines: 4-943-756-TALK (5-518.804.5758) and 5-806-KBUHRBO (2-645.691.8124). If you or someone you know talks about suicide or feeling hopeless, get help right away. When should you call for help? Call 911 anytime you think you may need emergency care. For example, call if: 
  · You feel like hurting yourself or someone else.  
  · Someone you know has depression and is about to attempt or is attempting suicide.  
[facility] your doctor now or seek immediate medical care if: 
  · You hear voices.  
  · Someone you know has depression and: 
¨ Starts to give away his or her possessions. ¨ Uses illegal drugs or drinks alcohol heavily. ¨ Talks or writes about death, including writing suicide notes or talking about guns, knives, or pills. ¨ Starts to spend a lot of time alone. ¨ Acts very aggressively or suddenly appears calm.  
 Watch closely for changes in your health, and be sure to contact your doctor if: 
  · You do not get better as expected. Where can you learn more? Go to http://jac-jeff.info/. Enter G625 in the search box to learn more about \"Recovering From Depression: Care Instructions. \" Current as of: December 7, 2017 Content Version: 11.7 © 6447-2308 Healthwise, Incorporated. Care instructions adapted under license by [employer] (which disclaims liability or warranty for this information). If you have questions about a medical condition or this instruction, always ask your healthcare professional. [address] any warranty or liability for your use of this information. Introducing hospitals & HEALTH SERVICES! Dear Antonio Alejandre: 
Thank you for requesting a [employer] account. Our records indicate that you already have an active [employer] account. You can access your account anytime at [email or web address] Did you know that you can access your hospital and ER discharge instructions at any time in [employer]? You can also review all of your test results from your hospital stay or ER visit. Additional Information If you have questions, please visit the Frequently Asked Questions section of the [employer] website at [email or web address] Remember, [employer] is NOT to be used for urgent needs. For medical emergencies, dial 911. Now available from your iPhone and Android! Please provide this summary of care documentation to your next provider. Your primary care clinician is listed as Sean Grossman. If you have any questions after today's visit, please call [phone].

## 2018-09-18 NOTE — PATIENT INSTRUCTIONS
Recovering From Depression: Care Instructions  Your Care Instructions    Taking good care of yourself is important as you recover from depression. In time, your symptoms will fade as your treatment takes hold. Do not give up. Instead, focus your energy on getting better. Your mood will improve. It just takes some time. Focus on things that can help you feel better, such as being with friends and family, eating well, and getting enough rest. But take things slowly. Do not do too much too soon. You will begin to feel better gradually. Follow-up care is a key part of your treatment and safety. Be sure to make and go to all appointments, and call your doctor if you are having problems. It's also a good idea to know your test results and keep a list of the medicines you take. How can you care for yourself at home? Be realistic  · If you have a large task to do, break it up into smaller steps you can handle, and just do what you can. · You may want to put off important decisions until your depression has lifted. If you have plans that will have a major impact on your life, such as marriage, divorce, or a job change, try to wait a bit. Talk it over with friends and loved ones who can help you look at the overall picture first.  · Reaching out to people for help is important. Do not isolate yourself. Let your family and friends help you. Find someone you can trust and confide in, and talk to that person. · Be patient, and be kind to yourself. Remember that depression is not your fault and is not something you can overcome with willpower alone. Treatment is necessary for depression, just like for any other illness. Feeling better takes time, and your mood will improve little by little. Stay active  · Stay busy and get outside. Take a walk, or try some other light exercise. · Talk with your doctor about an exercise program. Exercise can help with mild depression. · Go to a movie or concert.  Take part in a Muslim activity or other social gathering. Go to a [employer] game. · Ask a friend to have dinner with you. Take care of yourself  · Eat a balanced diet with plenty of fresh fruits and vegetables, whole grains, and lean protein. If you have lost your appetite, eat small snacks rather than large meals. · Avoid drinking alcohol or using illegal drugs. Do not take medicines that have not been prescribed for you. They may interfere with medicines you may be taking for depression, or they may make your depression worse. · Take your medicines exactly as they are prescribed. You may start to feel better within 1 to 3 weeks of taking antidepressant medicine. But it can take as many as 6 to 8 weeks to see more improvement. If you have questions or concerns about your medicines, or if you do not notice any improvement by 3 weeks, talk to your doctor. · If you have any side effects from your medicine, tell your doctor. Antidepressants can make you feel tired, dizzy, or nervous. Some people have dry mouth, constipation, headaches, sexual problems, or diarrhea. Many of these side effects are mild and will go away on their own after you have been taking the medicine for a few weeks. Some may last longer. Talk to your doctor if side effects are bothering you too much. You might be able to try a different medicine. · Get enough sleep. If you have problems sleeping:  ¨ Go to bed at the same time every night, and get up at the same time every morning. ¨ Keep your bedroom dark and quiet. ¨ Do not exercise after 5:00 p.m. ¨ Avoid drinks with caffeine after 5:00 p.m. · Avoid sleeping pills unless they are prescribed by the doctor treating your depression. Sleeping pills may make you groggy during the day, and they may interact with other medicine you are taking. · If you have any other illnesses, such as diabetes, heart disease, or high blood pressure, make sure to continue with your treatment.  Tell your doctor about all of the medicines you take, including those with or without a prescription. · Keep the numbers for these national suicide hotlines: 9-322-364-TALK (7-781.658.4874) and 9-591-HQDWVCC (8-896.473.6305). If you or someone you know talks about suicide or feeling hopeless, get help right away. When should you call for help? Call 911 anytime you think you may need emergency care. For example, call if:    · You feel like hurting yourself or someone else.     · Someone you know has depression and is about to attempt or is attempting suicide.   [facility] your doctor now or seek immediate medical care if:    · You hear voices.     · Someone you know has depression and:  ¨ Starts to give away his or her possessions. ¨ Uses illegal drugs or drinks alcohol heavily. ¨ Talks or writes about death, including writing suicide notes or talking about guns, knives, or pills. ¨ Starts to spend a lot of time alone. ¨ Acts very aggressively or suddenly appears calm.    Watch closely for changes in your health, and be sure to contact your doctor if:    · You do not get better as expected. Where can you learn more? Go to http://jac-jeff.info/. Enter D997 in the search box to learn more about \"Recovering From Depression: Care Instructions. \"  Current as of: December 7, 2017  Content Version: 11.7  © 7396-1148 [employer]. Care instructions adapted under license by [employer] (which disclaims liability or warranty for this information). If you have questions about a medical condition or this instruction, always ask your healthcare professional. [address] any warranty or liability for your use of this information.

## 2018-09-18 NOTE — PROGRESS NOTES
CC:   Chief Complaint   Patient presents with    Hypertension    Depression       HISTORY OF PRESENT ILLNESS  Ashley Green is a 56 y.o. female. Presents for 3 month follow up evaluation.  She has HTN, depression with anxiety, fibromyalgia, chronic pain, osteoarthritis at lumbar spine, morbid obesity, and numbness at feet. Today she complains of unchanged chronic diffuse muscle aches, chronic low back pain, left sided buttock pain, right-sided sciatica, bilateral knee and hip pains, cramping at legs, and sensation of heaviness at the legs. Has new numbness and pain at both hands. Ambulates with a cane. Sleeps in a recliner. Pain worsens with prolonged standing and prolonged sitting. Takes Tramadol 50 mg 1 tab once daily for pain. Lyrica did not help her symptoms. Working reduced work hours. She applied for disability but was denied because she works part-time. She complains of more pain and numbness in both hands. Just started a few months ago. Helps her mother who lives in [facility].       Depression Review  Patient is seen for follow up of depression. Ongoing depressed mood, fatigue, anhedonia, and problems concentrating. Treatment includes Effexor. She denies recurrent thoughts of death and suicidal thoughts without plan. She experiences the following side effects from the treatment: none. Was able to get Effexor and her other medications through a [facility]. Effexor costs her about $9 a month.      Soc Hx  . Has 1 adult daughter in good health. Her 2nd grandchild is due in Nov 2018. Helps her mother who lives in [facility] to schedule doctor's appointments. 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 Never smoker. Used to drink alcohol occasionally but stopped all alcohol in 2005. Denies recreational drug use.  Does not get exercise due to her chronic pain.          Chronic Pain Review  Yenifer Schmitt RTC today to follow up on chronic pain. We discussed her osteoarthritis, fibromyalgia, radiculopathy and spondylolisthesis that is affecting her back, bilateral hand, bilateral hips and bilateral knees and causing depression.  Significant changes since last visit: none.  She is  able to do her normal daily activities.  She reports the following adverse side effects: none.      Least pain over the last week has been 7/10.      Worst pain over the last week has been 10/10.      Opioid Risk Tool Reviewed: YES      Aberrant behaviors: None.        Urine Drug Screen: Last checked 8/14/18/      Controlled substance agreement on file: YES.          reviewed:yes      Pill count is consistent with her prescription: yes      Concomitant use of a benzodiazepine: no      Active daily MME is 15 mg.      Naloxone prescription not warranted.       ROS  Constitutional: negative for fevers, chills, night sweats; positive for fatigue  ENT: negative for sore throat, nasal congestion, ear pains, hoarseness  Respiratory: negative for cough, hemoptysis, wheezing; positive for dyspnea with exertion  CV: negative for chest pain, palpitations, lower extremity edema  GI: negative for heartburn, abd pain, nausea, vomiting, diarrhea, constipation  Genitourinary: negative for frequency, dysuria and hematuria  Integument: negative for rash and pruritus  Musculoskel: negative for muscle weakness; positive for myalgias, chronic low back pain  Neurological: negative for dizziness; positive for numbness in feet and hands, gait problems (ambulates with cane), and occ headaches  Behavl/Psych: positive or feelings of anxiety, depression     Patient Active Problem List   Diagnosis Code    HTN (hypertension) I10    Numbness of feet R20.0    Osteopenia M85.80    Fibromyalgia M79.7    Left sided sciatica M54.32    Depression with anxiety F41.8    Lumbar herniated disc M51.26    Primary osteoarthritis of left hip M16.12    Primary osteoarthritis of both knees M17.0    Degenerative lumbar disc M51.36    Morbid obesity with BMI of 50.0-59.9, adult ([facility]) E66.01, Z68.43    High risk medication use Z79.899     Past Medical History:   Diagnosis Date    Depression with anxiety     Fibromyalgia     Hypertension     Morbid [facility] ([facility] [address]     Osteoarthritis of knee 2016    R knee X-ray 7/5/16 ([facility]) mod to severe tibiofemoral arthritis, mod patellofemoral arthritis    Osteoarthritis of left hip 2015    Osteopenia      No Known Allergies    Current Outpatient Prescriptions   Medication Sig Dispense Refill    benazepril (LOTENSIN) 40 mg tablet Take 1 Tab by mouth daily. For blood pressure. 90 Tab 3    venlafaxine-SR (EFFEXOR-XR) 150 mg capsule Take 1 Cap by mouth daily. Indications: major depressive disorder 90 Cap 3    traMADol (ULTRAM) 50 mg tablet TAKE 1 TABLET BY MOUTH EVERY 8 HOURS AS NEEDED FOR PAIN 45 Tab 0    ascorbic acid, vitamin C, (VITAMIN C) 250 mg tablet Take 1,000 mg by mouth daily.  cholecalciferol (VITAMIN D3) 1,000 unit cap Take 5,000 Units by mouth daily. Plant based           PHYSICAL EXAM  Visit Vitals    /82 (BP 1 Location: Left arm, BP Patient Position: Sitting)    Pulse (!) 107    Temp 98.8 °F (37.1 °C) (Oral)    Resp 18    Ht 5' 4\" (1.626 m)    Wt 322 lb (146.1 kg)    SpO2 95%    BMI 55.27 kg/m2       General: Morbidly obese, no distress. HEENT:  Head normocephalic/atraumatic, no scleral icterus  Lungs:  Clear to ausculation bilaterally. Good air movement. Heart:  Tachycardic, regular rhythm, normal S1 and S2, no murmur, gallop, or rub  Extremities: No clubbing, cyanosis. 1+ pitting bilateral LE edema especially at ankles and feet. Neurological: Alert and oriented. Decreased MS at both LE's. Psychiatric: Normal mood and affect. Behavior is normal        ASSESSMENT AND PLAN    ICD-10-CM ICD-9-CM    1. Essential hypertension I10 401.9    2. Fibromyalgia M79.7 729.1    3. Primary osteoarthritis of left hip M16.12 715.15    4. Moderate major depression (HCC) F32.1 296.22    5. Morbid obesity with BMI of 50.0-59.9, adult (HCC) E66.01 278.01     Z68.43 V85.43    6. Lymphedema I89.0 457.1 REFERRAL TO LYMPHEDEMA CLINIC   7. Screening for cervical cancer Z12.4 V76.2 REFERRAL TO GYNECOLOGY   8. Screening for colon cancer Z12.11 V76.51    9. Screening for breast cancer Z12.31 V76.10      Diagnoses and all orders for this visit:    1. Essential hypertension  Controlled on benazepril. 2. Fibromyalgia  Continue Tramadol for pain. 3. Primary osteoarthritis of left hip  Told before by an orthopedist that she needs left hip replacement. 4. Moderate major depression (HCC)  Controlled on Effexor. 5. Morbid obesity with BMI of 50.0-59.9, adult ([facility] [address]  Discussed the patient's BMI with her. The BMI follow up plan is as follows: dietary management education, guidance, and counseling; encourage exercise; monitor weight; prescribed dietary intake. Follow up BMI in 3 months. 6. Lymphedema  -     REFERRAL TO LYMPHEDEMA CLINIC    7. Screening for cervical cancer  -     REFERRAL TO GYNECOLOGY    8. Screening for colon cancer  Already has FIT at home. Instructed to complete and mail it. 9. Screening for breast cancer  Patient instructed to call Mammography for an appointment; already has referral order at home. Follow-up Disposition:  Return in about 4 months (around 1/18/2019), or if symptoms worsen or fail to improve, for Fibromyalgia, OA, depression. I have discussed the diagnosis with the patient and the intended plan as seen in the above orders. Patient is in agreement. The patient has received an after-visit summary and questions were answered concerning future plans. I have discussed medication side effects and warnings with the patient as well.

## 2018-10-27 ENCOUNTER — HOSPITAL ENCOUNTER (EMERGENCY)
Age: 58
Discharge: HOME | End: 2018-10-27
Attending: EMERGENCY MEDICINE
Payer: SELF-PAY

## 2018-10-27 VITALS
SYSTOLIC BLOOD PRESSURE: 157 MMHG | OXYGEN SATURATION: 98 % | TEMPERATURE: 98.9 F | BODY MASS INDEX: 50.02 KG/M2 | HEART RATE: 89 BPM | HEIGHT: 64 IN | WEIGHT: 293 LBS | DIASTOLIC BLOOD PRESSURE: 67 MMHG | RESPIRATION RATE: 16 BRPM

## 2018-10-27 DIAGNOSIS — S81.802A LEG WOUND, LEFT, INITIAL ENCOUNTER: Primary | ICD-10-CM

## 2018-10-27 LAB
ALBUMIN SERPL-MCNC: 3.7 G/DL (ref 3.5–5)
ALBUMIN/GLOB SERPL: 0.9 {RATIO} (ref 1.1–2.2)
ALP SERPL-CCNC: 88 U/L (ref 45–117)
ALT SERPL-CCNC: 20 U/L (ref 12–78)
ANION GAP SERPL CALC-SCNC: 9 MMOL/L (ref 5–15)
AST SERPL-CCNC: 16 U/L (ref 15–37)
BASOPHILS # BLD: 0 K/UL (ref 0–0.1)
BASOPHILS NFR BLD: 0 % (ref 0–1)
BILIRUB SERPL-MCNC: 0.3 MG/DL (ref 0.2–1)
BUN SERPL-MCNC: 12 MG/DL (ref 6–20)
BUN/CREAT SERPL: 18 (ref 12–20)
CALCIUM SERPL-MCNC: 8.9 MG/DL (ref 8.5–10.1)
CHLORIDE SERPL-SCNC: 104 MMOL/L (ref 97–108)
CO2 SERPL-SCNC: 27 MMOL/L (ref 21–32)
CREAT SERPL-MCNC: 0.66 MG/DL (ref 0.55–1.02)
DIFFERENTIAL METHOD BLD: ABNORMAL
EOSINOPHIL # BLD: 0 K/UL (ref 0–0.4)
EOSINOPHIL NFR BLD: 0 % (ref 0–7)
ERYTHROCYTE [DISTWIDTH] IN BLOOD BY AUTOMATED COUNT: 14.6 % (ref 11.5–14.5)
GLOBULIN SER CALC-MCNC: 4 G/DL (ref 2–4)
GLUCOSE SERPL-MCNC: 88 MG/DL (ref 65–100)
HCT VFR BLD AUTO: 40.8 % (ref 35–47)
HGB BLD-MCNC: 13 G/DL (ref 11.5–16)
IMM GRANULOCYTES # BLD: 0 K/UL (ref 0–0.04)
IMM GRANULOCYTES NFR BLD AUTO: 0 % (ref 0–0.5)
LYMPHOCYTES # BLD: 2.1 K/UL (ref 0.8–3.5)
LYMPHOCYTES NFR BLD: 24 % (ref 12–49)
MCH RBC QN AUTO: 27.1 PG (ref 26–34)
MCHC RBC AUTO-ENTMCNC: 31.9 G/DL (ref 30–36.5)
MCV RBC AUTO: 85.2 FL (ref 80–99)
MONOCYTES # BLD: 0.6 K/UL (ref 0–1)
MONOCYTES NFR BLD: 6 % (ref 5–13)
NEUTS SEG # BLD: 6 K/UL (ref 1.8–8)
NEUTS SEG NFR BLD: 69 % (ref 32–75)
NRBC # BLD: 0 K/UL (ref 0–0.01)
NRBC BLD-RTO: 0 PER 100 WBC
PLATELET # BLD AUTO: 318 K/UL (ref 150–400)
PMV BLD AUTO: 10.9 FL (ref 8.9–12.9)
POTASSIUM SERPL-SCNC: 4 MMOL/L (ref 3.5–5.1)
PROT SERPL-MCNC: 7.7 G/DL (ref 6.4–8.2)
RBC # BLD AUTO: 4.79 M/UL (ref 3.8–5.2)
SODIUM SERPL-SCNC: 140 MMOL/L (ref 136–145)
WBC # BLD AUTO: 8.8 K/UL (ref 3.6–11)

## 2018-10-27 PROCEDURE — 74011250637 HC RX REV CODE- 250/637: Performed by: EMERGENCY MEDICINE

## 2018-10-27 PROCEDURE — 85025 COMPLETE CBC W/AUTO DIFF WBC: CPT | Performed by: EMERGENCY MEDICINE

## 2018-10-27 PROCEDURE — 99283 EMERGENCY DEPT VISIT LOW MDM: CPT

## 2018-10-27 PROCEDURE — 36415 COLL VENOUS BLD VENIPUNCTURE: CPT | Performed by: EMERGENCY MEDICINE

## 2018-10-27 PROCEDURE — 80053 COMPREHEN METABOLIC PANEL: CPT | Performed by: EMERGENCY MEDICINE

## 2018-10-27 RX ORDER — ACETAMINOPHEN 500 MG
1000 TABLET ORAL ONCE
Status: COMPLETED | OUTPATIENT
Start: 2018-10-27 | End: 2018-10-27

## 2018-10-27 RX ORDER — CEPHALEXIN 500 MG/1
500 CAPSULE ORAL 3 TIMES DAILY
Qty: 21 CAP | Refills: 0 | Status: SHIPPED | OUTPATIENT
Start: 2018-10-27 | End: 2018-11-03

## 2018-10-27 RX ORDER — CHOLECALCIFEROL (VITAMIN D3) 125 MCG
CAPSULE ORAL
COMMUNITY

## 2018-10-27 RX ADMIN — ACETAMINOPHEN 1000 MG: 500 TABLET ORAL at 15:03

## 2018-10-27 NOTE — ED NOTES
[facility] Report included the following information: SBAR, ED Summary, MAR and Recent Results.

## 2018-10-27 NOTE — ED NOTES
Patient's blood samples clotted/hemolyzed. New samples sent from venipuncture. MD aware of delay as well as patient.

## 2018-10-27 NOTE — ED PROVIDER NOTES
EMERGENCY DEPARTMENT HISTORY AND PHYSICAL EXAM      Date: 10/27/2018  Patient Name: David Navarrete    History of Presenting Illness     Chief Complaint   Patient presents with    Leg Pain     Patient ambulatory with slow steady gait with cane and complain of left lower leg pain after \"picking a blister off\" her leg. redness and yellow drainage noted to leg       History Provided By: Patient    HPI: David Navarrete, 66 y.o. female with PMHx significant for HTN, fibromyalgia, morbid obesity, anxiety, and depression, presents ambulatory with cane to the ED with cc of persistent wound to the LLE ongoing several weeks. Pt states the wound occurred after using an inanimate object to scratch her LLE, as she is unable to reach that area of her LLE, when the wound \"popped\". She states she had been cleaning the area with Hydrogen Peroxide over the past several weeks, but continued to have redness, swelling, and tenderness. Pt explains the redness and swelling have resolved, but presents to the ED as her daughter requested she come for further evaluation. Pt is otherwise without complaints and specifically denies any fever, chills, n/v/d, chest pain, SOB. There are no other complaints, changes, or physical findings at this time. PCP: Kiko Mckeon MD    Current Outpatient Medications   Medication Sig Dispense Refill    naproxen sodium (ALEVE) 220 mg cap Take  by mouth.  cephALEXin (KEFLEX) 500 mg capsule Take 1 Cap by mouth three (3) times daily for 7 days. 21 Cap 0    benazepril (LOTENSIN) 40 mg tablet Take 1 Tab by mouth daily. For blood pressure. 90 Tab 3    venlafaxine-SR (EFFEXOR-XR) 150 mg capsule Take 1 Cap by mouth daily. Indications: major depressive disorder 90 Cap 3    traMADol (ULTRAM) 50 mg tablet TAKE 1 TABLET BY MOUTH EVERY 8 HOURS AS NEEDED FOR PAIN 45 Tab 0    ascorbic acid, vitamin C, (VITAMIN C) 250 mg tablet Take 1,000 mg by mouth daily.       cholecalciferol (VITAMIN D3) 1,000 unit cap Take 5,000 Units by mouth daily. Plant based         Past History     Past Medical History:  Past Medical History:   Diagnosis Date    Depression with anxiety     Fibromyalgia     Hypertension     Morbid obesity ([address]     Osteoarthritis of knee 2016    R knee X-ray 7/5/16 ([facility]) mod to severe tibiofemoral arthritis, mod patellofemoral arthritis    Osteoarthritis of left hip 2015    Osteopenia        Past Surgical History:  Past Surgical History:   Procedure Laterality Date    [facility]    Dr. Larissa Dillard    HX FREE SKIN GRAFT  1990    [facility]    HX WISDOM TEETH EXTRACTION  1992       Family History:  Family History   Problem Relation Age of Onset    Diabetes Mother         degenerative disk disease/fibromyalgia/artheritis    Depression Mother     Anxiety Mother     Other Mother         Fibromyalgia    Other Maternal Aunt         lymphoma    No Known Problems Father        Social History:  Social History     Tobacco Use    Smoking status: Never Smoker    Smokeless tobacco: Never Used   Substance Use Topics    Alcohol use: No     Alcohol/week: 0.0 oz     Comment: previously drank beer quit in 2005    Drug use: No       Allergies:  No Known Allergies      Review of Systems   Review of Systems   Constitutional: Negative for activity change, appetite change, chills, fever and unexpected weight change. HENT: Negative for congestion. Eyes: Negative for pain and visual disturbance. Respiratory: Negative for cough and shortness of breath. Cardiovascular: Negative for chest pain. Gastrointestinal: Negative for abdominal pain, diarrhea, nausea and vomiting. Genitourinary: Negative for dysuria. Musculoskeletal: Negative for back pain. Skin: Positive for wound. Negative for rash. Neurological: Negative for headaches. Physical Exam   Physical Exam   Constitutional: She is oriented to person, place, and time. She appears well-developed and well-nourished. Morbidly obese female    HENT:   Head: Normocephalic and atraumatic. Mouth/Throat: Oropharynx is clear and moist.   Eyes: Conjunctivae and EOM are normal. Pupils are equal, round, and reactive to light. Right eye exhibits no discharge. Left eye exhibits no discharge. Neck: Normal range of motion. Neck supple. Cardiovascular: Normal rate, regular rhythm and normal heart sounds. No murmur heard. Pulmonary/Chest: Effort normal and breath sounds normal. No respiratory distress. She has no wheezes. She has no rales. Abdominal: Soft. Bowel sounds are normal. She exhibits no distension. There is no tenderness. Musculoskeletal: Normal range of motion. BLUE edema appears chronic with skin changes    Neurological: She is alert and oriented to person, place, and time. No cranial nerve deficit. She exhibits normal muscle tone. Skin: Skin is warm and dry. No rash noted. She is not diaphoretic. 5x5 cm wound to left lower lateral leg without surrounding erythema, warmth, or tenderness   Nursing note and vitals reviewed. Diagnostic Study Results     Labs -     Recent Results (from the past 12 hour(s))   METABOLIC PANEL, COMPREHENSIVE    Collection Time: 10/27/18  4:09 PM   Result Value Ref Range    Sodium 140 136 - 145 mmol/L    Potassium 4.0 3.5 - 5.1 mmol/L    Chloride 104 97 - 108 mmol/L    CO2 27 21 - 32 mmol/L    Anion gap 9 5 - 15 mmol/L    Glucose 88 65 - 100 mg/dL    BUN 12 6 - 20 MG/DL    Creatinine 0.66 0.55 - 1.02 MG/DL    BUN/Creatinine ratio 18 12 - 20      GFR est AA >60 >60 ml/min/1.73m2    GFR est non-AA >60 >60 ml/min/1.73m2    Calcium 8.9 8.5 - 10.1 MG/DL    Bilirubin, total 0.3 0.2 - 1.0 MG/DL    ALT (SGPT) 20 12 - 78 U/L    AST (SGOT) 16 15 - 37 U/L    Alk.  phosphatase 88 45 - 117 U/L    Protein, total 7.7 6.4 - 8.2 g/dL    Albumin 3.7 3.5 - 5.0 g/dL    Globulin 4.0 2.0 - 4.0 g/dL    A-G Ratio 0.9 (L) 1.1 - 2.2     CBC WITH AUTOMATED DIFF    Collection Time: 10/27/18  4:09 PM   Result Value Ref Range    WBC 8.8 3.6 - 11.0 K/uL    RBC 4.79 3.80 - 5.20 M/uL    HGB 13.0 11.5 - 16.0 g/dL    HCT 40.8 35.0 - 47.0 %    MCV 85.2 80.0 - 99.0 FL    MCH 27.1 26.0 - 34.0 PG    MCHC 31.9 30.0 - 36.5 g/dL    RDW 14.6 (H) 11.5 - 14.5 %    PLATELET 507 543 - 792 K/uL    MPV 10.9 8.9 - 12.9 FL    NRBC 0.0 0  WBC    ABSOLUTE NRBC 0.00 0.00 - 0.01 K/uL    NEUTROPHILS 69 32 - 75 %    LYMPHOCYTES 24 12 - 49 %    MONOCYTES 6 5 - 13 %    EOSINOPHILS 0 0 - 7 %    BASOPHILS 0 0 - 1 %    IMMATURE GRANULOCYTES 0 0.0 - 0.5 %    ABS. NEUTROPHILS 6.0 1.8 - 8.0 K/UL    ABS. LYMPHOCYTES 2.1 0.8 - 3.5 K/UL    ABS. MONOCYTES 0.6 0.0 - 1.0 K/UL    ABS. EOSINOPHILS 0.0 0.0 - 0.4 K/UL    ABS. BASOPHILS 0.0 0.0 - 0.1 K/UL    ABS. IMM. GRANS. 0.0 0.00 - 0.04 K/UL    DF AUTOMATED       Medical Decision Making   I am the first provider for this patient. I reviewed the vital signs, available nursing notes, past medical history, past surgical history, family history and social history. Vital Signs-Reviewed the patient's vital signs. Patient Vitals for the past 12 hrs:   Temp Pulse Resp BP SpO2   10/27/18 1515 -- -- -- 157/67 98 %   10/27/18 1250 98.9 °F (37.2 °C) 89 16 (!) 163/100 100 %       Pulse Oximetry Analysis - 100% on RA    Cardiac Monitor:   Rate: 89 bpm  Rhythm: Normal Sinus Rhythm      Records Reviewed: Nursing Notes and Old Medical Records    Provider Notes (Medical Decision Making):   Skin wound appears healing on it's own. Will initiate lab analysis to determine need for abx and refer to wound care clinic. ED Course:   Initial assessment performed. The patients presenting problems have been discussed, and they are in agreement with the care plan formulated and outlined with them. I have encouraged them to ask questions as they arise throughout their visit. Critical Care Time: 0    Disposition:  DISCHARGE NOTE:  4:54 PM  The patient is ready for discharge.  The patients signs, symptoms, diagnosis, and instructions for discharge have been discussed and the pt has conveyed their understanding. The patient is to follow up as recommended with PCP or return to the ER should their symptoms worsen. Plan has been discussed and patient has conveyed their agreement. PLAN:  1. Discharge home  Discharge Medication List as of 10/27/2018  4:54 PM      START taking these medications    Details   cephALEXin (KEFLEX) 500 mg capsule Take 1 Cap by mouth three (3) times daily for 7 days. , Normal, Disp-21 Cap, R-0         CONTINUE these medications which have NOT CHANGED    Details   naproxen sodium (ALEVE) 220 mg cap Take  by mouth., Historical Med      benazepril (LOTENSIN) 40 mg tablet Take 1 Tab by mouth daily. For blood pressure., Normal, Disp-90 Tab, R-3      venlafaxine-SR (EFFEXOR-XR) 150 mg capsule Take 1 Cap by mouth daily. Indications: major depressive disorder, Normal, Disp-90 Cap, R-3      traMADol (ULTRAM) 50 mg tablet TAKE 1 TABLET BY MOUTH EVERY 8 HOURS AS NEEDED FOR PAIN, Print, Disp-45 Tab, R-0      ascorbic acid, vitamin C, (VITAMIN C) 250 mg tablet Take 1,000 mg by mouth daily. , Historical Med      cholecalciferol (VITAMIN D3) 1,000 unit cap Take 5,000 Units by mouth daily. Plant based, Historical Med           2. Follow-up Information     Follow up With Specialties Details Why Contact Info    [facility] EMERGENCY DEPT Emergency Medicine  If symptoms worsen [address] [phone]    [facility] [address] [address]  [phone]        Return to ED if worse     Diagnosis     Clinical Impression:   1. Leg wound, left, initial encounter        Attestations: This note is prepared by Alisa Lincoln, acting as Scribe for Steve Jiang MD.    Steve Jiang MD: The scribe's documentation has been prepared under my direction and personally reviewed by me in its entirety.  I confirm that the note above accurately reflects all work, treatment, procedures, and medical decision making performed by me.

## 2018-10-27 NOTE — ED NOTES
Received patient to exam room. Pt in bed in position of comfort and call bell within reach. Pt came in for ulcerated area on left lower extremity for the past 2-3 wks. Pt not diabetic but does have lymphedema and presents with +3 le edema,  Pulses palpable.

## 2018-10-27 NOTE — DISCHARGE INSTRUCTIONS
Wound Care: After Your Visit  Your Care Instructions  Taking good care of your wound at home will help it heal quickly and reduce your chance of infection. The doctor has checked you carefully, but problems can develop later. If you notice any problems or new symptoms, get medical treatment right away. Follow-up care is a key part of your treatment and safety. Be sure to make and go to all appointments, and call your doctor if you are having problems. It's also a good idea to know your test results and keep a list of the medicines you take. How can you care for yourself at home? · Clean the area with soap and water 2 times a day unless your doctor gives you different instructions. Don't use hydrogen peroxide or alcohol, which can slow healing. ¨ You may cover the wound with a thin layer of antibiotic ointment, such as bacitracin, and a nonstick bandage. ¨ Apply more ointment and replace the bandage as needed. · Take pain medicines exactly as directed. Some pain is normal with a wound, but do not ignore pain that is getting worse instead of better. You could have an infection. ¨ If the doctor gave you a prescription medicine for pain, take it as prescribed. ¨ If you are not taking a prescription pain medicine, ask your doctor if you can take an over-the-counter medicine. · Your doctor may have closed your wound with stitches (sutures), staples, or skin glue. ¨ If you have stitches, your doctor may remove them after several days to 2 weeks. Or you may have stitches that dissolve on their own. ¨ If you have staples, your doctor may remove them after 7 to 10 days. ¨ If your wound was closed with skin glue, the glue will wear off in a few days to 2 weeks. When should you call for help? Call your doctor now or seek immediate medical care if:  · You have signs of infection, such as:  ¨ Increased pain, swelling, warmth, or redness near the wound. ¨ Red streaks leading from the wound.   ¨ Pus draining from the wound. ¨ A fever. · You bleed so much from your incision that you soak one or more bandages over 2 to 4 hours. Watch closely for changes in your health, and be sure to contact your doctor if:  · The wound is not getting better each day. Where can you learn more? Go to [employer]  Enter M973 in the search box to learn more about \"Wound Care: After Your Visit. \"   © 8392-8321 [employer]. Care instructions adapted under license by [facility] (which disclaims liability or warranty for this information). This care instruction is for use with your licensed healthcare professional. If you have questions about a medical condition or this instruction, always ask your healthcare professional. [address] any warranty or liability for your use of this information. Content Version: 40.3.586391;  Last Revised: April 23, 2012

## 2018-10-27 NOTE — ED NOTES
Discharge instructions reviewed with patient. Discharge instructions given to patient per Dr. Christian Kent. Patient able to return/verbalize discharge instructions. Copy of discharge instructions provided. Patient condition stable, respiratory status within normal limits, neuro status intact. Ambulatory out of ER.

## 2018-11-20 DIAGNOSIS — M79.7 FIBROMYALGIA: ICD-10-CM

## 2018-11-20 DIAGNOSIS — I10 ESSENTIAL HYPERTENSION: ICD-10-CM

## 2018-11-21 DIAGNOSIS — Z79.899 HIGH RISK MEDICATION USE: Primary | ICD-10-CM

## 2018-11-21 RX ORDER — TRAMADOL HYDROCHLORIDE 50 MG/1
TABLET ORAL
Qty: 45 TAB | Refills: 0 | Status: SHIPPED | OUTPATIENT
Start: 2018-11-21 | End: 2019-01-29 | Stop reason: SDUPTHER

## 2018-11-21 RX ORDER — BENAZEPRIL HYDROCHLORIDE 40 MG/1
40 TABLET ORAL DAILY
Qty: 90 TAB | Refills: 3 | Status: SHIPPED | OUTPATIENT
Start: 2018-11-21 | End: 2019-08-14 | Stop reason: SDUPTHER

## 2018-11-21 NOTE — TELEPHONE ENCOUNTER
REFILL     PCP: Flaco Garcia MD     Last appt: 9/18/2018   Future Appointments   Date Time Provider Keke Jerez   11/29/2018  9:00 AM [facility] WOUND CARE [facility]        Requested Prescriptions     Pending Prescriptions Disp Refills    benazepril (LOTENSIN) 40 mg tablet 90 Tab 3     Sig: Take 1 Tab by mouth daily. For blood pressure.     traMADol (ULTRAM) 50 mg tablet 45 Tab 0     Sig: TAKE 1 TABLET BY MOUTH EVERY 8 HOURS AS NEEDED FOR PAIN

## 2018-11-27 NOTE — PROGRESS NOTES
JACINTA Roberto is a 66y.o. year old female patient of Marissa North MD who presents with c/o sores on legs. Pt has history of has HTN (hypertension), Numbness of feet, Osteopenia, Fibromyalgia, Left sided sciatica, Depression with anxiety, Lumbar herniated disc, Primary osteoarthritis of left hip, Primary osteoarthritis of both knees, Degenerative lumbar disc, Morbid obesity with BMI of 50.0-59.9, adult ([address] High risk medication use, and Moderate major depression ([address] on their problem list..    States left leg wound never healed completely. Occurred after scratching leg. Did not go to lymphedema clinic. C/o new sore to right ankle that is draining yellow fluid. Also has a new area popping up on left lower leg. Started getting the sores in October. Denies fevers or chills. Keeping area clean, using peroxide and bandage. Has been trying to lose weight. Can't work out bc needs hip replacement. She stopped taking her Effexor bc she thought maybe the leg ulcers were a side effect. Didn't wean herself off. Did have some bad side effects. Started taking some OTC supplements instead. Denies SI/HI. Cries all the time, has been that way for a long time. Doesn't see a therapist.   Has a daughter and two grandsons that makes her want to keep going. Chart Review  Pt was treated for left leg wound on 10/27 in the ED with keflex and referred to wound. She has an appt with wound clinic on December 3rd. Pt has hx of chronic LE swelling, ECHO was completed in August and pt was referred to lymphedema clinic in September. Per Lymphedema Clinic verbatim:  Referral was received from Dr. Maritza Ellis on September 18, 2018. We were not made aware of the referral until the patient called our department on 11/2/2018. Patient was called back today to schedule the evaluation. Information was provided regarding our lymphedema program.  Pt's questions were answered.   Pt ended the conversation stating that she wanted to confer with her PCP regarding the diagnosis of lymphedema and would call us back later if she decided to come to the clinic. Pt requested we discard her referral at this time.               Patient Active Problem List   Diagnosis Code    HTN (hypertension) I10    Numbness of feet R20.0    Osteopenia M85.80    Fibromyalgia M79.7    Left sided sciatica M54.32    Depression with anxiety F41.8    Lumbar herniated disc M51.26    Primary osteoarthritis of left hip M16.12    Primary osteoarthritis of both knees M17.0    Degenerative lumbar disc M51.36    Morbid obesity with BMI of 50.0-59.9, adult (HCC) E66.01, Z68.43    High risk medication use Z79.899    Moderate major depression ([facility]) F32.1     Past Medical History:   Diagnosis Date    Depression with anxiety     Fibromyalgia     Hypertension     Morbid [facility]     Osteoarthritis of knee 2016    R knee X-ray 7/5/16 ([facility]) mod to severe tibiofemoral arthritis, mod patellofemoral arthritis    Osteoarthritis of left hip 2015    Osteopenia      Past Surgical History:   Procedure Laterality Date    Danna Cordova    HX FREE SKIN GRAFT  1990    [facility]    HX WISDOM TEETH EXTRACTION  1992     Social History     Socioeconomic History    Marital status:      Spouse name: Not on file    Number of children: Not on file    Years of education: Not on file    Highest education level: Not on file   Tobacco Use    Smoking status: Never Smoker    Smokeless tobacco: Never Used   Substance and Sexual Activity    Alcohol use: No     Alcohol/week: 0.0 oz     Comment: previously drank beer quit in 2005    Drug use: No    Sexual activity: Not Currently     Family History   Problem Relation Age of Onset    Diabetes Mother         degenerative disk disease/fibromyalgia/artheritis    Depression Mother    Christi Castellanos Anxiety Mother     Other Mother         Fibromyalgia    Other Maternal Aunt         lymphoma    No Known Problems Father      No Known Allergies    MEDICATIONS  Current Outpatient Medications   Medication Sig    benazepril (LOTENSIN) 40 mg tablet Take 1 Tab by mouth daily. For blood pressure.  traMADol (ULTRAM) 50 mg tablet TAKE 1 TABLET BY MOUTH EVERY 8 HOURS AS NEEDED FOR PAIN    naproxen sodium (ALEVE) 220 mg cap Take  by mouth.  venlafaxine-SR (EFFEXOR-XR) 150 mg capsule Take 1 Cap by mouth daily. Indications: major depressive disorder    ascorbic acid, vitamin C, (VITAMIN C) 250 mg tablet Take 1,000 mg by mouth daily.  cholecalciferol (VITAMIN D3) 1,000 unit cap Take 5,000 Units by mouth daily. Plant based     No current facility-administered medications for this visit. REVIEW OF SYSTEMS  Per HPI        Visit Vitals  /76 (BP 1 Location: Left arm, BP Patient Position: Sitting)   Pulse 99   Temp 98.8 °F (37.1 °C) (Oral)   Resp 18   Ht 5' 4\" (1.626 m)   Wt 316 lb 12.8 oz (143.7 kg)   SpO2 96%   BMI 54.38 kg/m²         General: Well-developed, well-nourished. In no distress. A&O x 3. Head: Normocephalic, atraumatic. Eyes: Conjunctiva clear. Lungs: Clear to auscultation bilaterally. No crackles or wheezes. No use of accessory muscles. Speaks in full sentences without SOB. Chest Wall: No tenderness or deformity. Heart: RRR, normal S1 and S2, no murmur, click, rub, or gallop. Skin: see below exam   Neurovasc: Bilateral edema in LE, worse in ankles and feet. Venous stasis ulcer to right lower ankle that is healing well. Left ankle venous stasis ulcer w/yellow drainage. Abscess to left lower leg approx dime sized with selby and surrounding erythema. Heat present in left LE. Dorsalis pedis pulses are 3+ on the right side, and 3+ on the left side. Posterior tibial pulses are 3+ on the right side, and 3+ on the left side. Musculoskeletal: Gait normal. ROM normal at both knees and shoulders. Psychiatric: Normal mood and affect.  Behavior is normal.       Lab Results   Component Value Date/Time    WBC 8.8 10/27/2018 04:09 PM    HGB 13.0 10/27/2018 04:09 PM    HCT 40.8 10/27/2018 04:09 PM    PLATELET 981  04:09 PM    MCV 85.2 10/27/2018 04:09 PM     Lab Results   Component Value Date/Time    Sodium 140 10/27/2018 04:09 PM    Potassium 4.0 10/27/2018 04:09 PM    Chloride 104 10/27/2018 04:09 PM    CO2 27 10/27/2018 04:09 PM    Anion gap 9 10/27/2018 04:09 PM    Glucose 88 10/27/2018 04:09 PM    BUN 12 10/27/2018 04:09 PM    Creatinine 0.66 10/27/2018 04:09 PM    BUN/Creatinine ratio 18 10/27/2018 04:09 PM    GFR est AA >60 10/27/2018 04:09 PM    GFR est non-AA >60 10/27/2018 04:09 PM    Calcium 8.9 10/27/2018 04:09 PM    Bilirubin, total 0.3 10/27/2018 04:09 PM    AST (SGOT) 16 10/27/2018 04:09 PM    Alk. phosphatase 88 10/27/2018 04:09 PM    Protein, total 7.7 10/27/2018 04:09 PM    Albumin 3.7 10/27/2018 04:09 PM    Globulin 4.0 10/27/2018 04:09 PM    A-G Ratio 0.9 (L) 10/27/2018 04:09 PM    ALT (SGPT) 20 10/27/2018 04:09 PM              Transthoracic Echocardiogram    Patient: Nena Ruiz  MRN: [id number]  ACCT #: [de-identified]  : 1960  Age: 56 years  Gender: Female  Height: 64 in  Weight: 299.4 lb  BSA: 2.32 mï¾²  BP: 120 / 80 mmHg  Study date: 02-Aug-2018  Status: Routine  Location: Echo lab  [address] #: [id number]    Allergies: NO KNOWN ALLERGIES    Ordering Physician: Rachel Liang MD  Reading Group:  [facility]  Technologist: Frank Gardner  Reading Physician: Adelita Cowden, MD    SUMMARY:  Left ventricle: Systolic function was normal. Ejection fraction was  estimated in the range of 65 % to 70 %. There were no regional wall motion  abnormalities. Wall thickness was at the upper limits of normal.    INDICATIONS: Edema. PROCEDURE: This was a routine study. The study included complete 2D  imaging, M-mode, complete spectral Doppler, and color Doppler. Systolic  blood pressure was 120 mmHg, at the start of the study.  Diastolic blood  pressure was 80 mmHg, at the start of the study. Image quality was  suboptimal.    LEFT VENTRICLE: Size was normal. Systolic function was normal. Ejection  fraction was estimated in the range of 65 % to 70 %. There were no  regional wall motion abnormalities. Wall thickness was at the upper limits  of normal.    RIGHT VENTRICLE: The size was normal. Systolic function was normal.    LEFT ATRIUM: Size was normal.    RIGHT ATRIUM: Size was normal.    MITRAL VALVE: Normal valve structure. There was normal leaflet separation. DOPPLER: There was no evidence for stenosis. There was trivial  regurgitation. AORTIC VALVE: The valve was trileaflet. Leaflets exhibited normal  thickness, mild calcification, and normal cuspal separation. DOPPLER:  Transaortic velocity was within the normal range. There was no stenosis. There was no regurgitation. TRICUSPID VALVE: Normal valve structure. There was normal leaflet  separation. DOPPLER: The transtricuspid velocity was within the normal  range. There was no evidence for tricuspid stenosis. There was trivial  regurgitation. PULMONIC VALVE: Not well visualized. DOPPLER: There was no regurgitation. AORTA: The root exhibited normal size. PERICARDIUM: Insignificant pericardial effusion and/or pericardial fat was  present. Prepared and E-signed by    Bre Parker MD  Signed 06-Aug-2018 12:09:54        ASSESSMENT and PLAN  Diagnoses and all orders for this visit:    1. Lymphedema of both lower extremities  -Agree with referral to lymphedema clinic, pt may wait until she sees wound clinic next week to see if they are able to help first    2. Cellulitis of left lower extremity  -     doxycycline (ADOXA) 100 mg tablet; Take 1 Tab by mouth two (2) times a day for 10 days.  -     AEROBIC BACTERIAL CULTURE    3.  Venous stasis ulcer of right ankle limited to breakdown of skin, unspecified whether varicose veins present ([facility]  -secondary to weight and chronic swelling  -encouraged pt to continue to work on weight loss, keeps lower legs clean and dry    4. Depression with anxiety  -Pt declines restarting an antidepressant at this time  -Pt declines therapy referral  -She will call us back if she changes her mind            Patient Instructions     Please contact our office regarding what you decide about your depression medication. We can send in a new one to your pharmacy or refill the Effexor if you would like to resume that. Please keep your wound clinic appt. Cellulitis: Care Instructions  Your Care Instructions    Cellulitis is a skin infection caused by bacteria, most often strep or staph. It often occurs after a break in the skin from a scrape, cut, bite, or puncture, or after a rash. Cellulitis may be treated without doing tests to find out what caused it. But your doctor may do tests, if needed, to look for a specific bacteria, like methicillin-resistant Staphylococcus aureus (MRSA). The doctor has checked you carefully, but problems can develop later. If you notice any problems or new symptoms, get medical treatment right away. Follow-up care is a key part of your treatment and safety. Be sure to make and go to all appointments, and call your doctor if you are having problems. It's also a good idea to know your test results and keep a list of the medicines you take. How can you care for yourself at home? · Take your antibiotics as directed. Do not stop taking them just because you feel better. You need to take the full course of antibiotics. · Prop up the infected area on pillows to reduce pain and swelling. Try to keep the area above the level of your heart as often as you can. · If your doctor told you how to care for your wound, follow your doctor's instructions. If you did not get instructions, follow this general advice:  ? Wash the wound with clean water 2 times a day. Don't use hydrogen peroxide or alcohol, which can slow healing.   ? You may cover the wound with a thin layer of petroleum jelly, such as Vaseline, and a nonstick bandage. ? Apply more petroleum jelly and replace the bandage as needed. · Be safe with medicines. Take pain medicines exactly as directed. ? If the doctor gave you a prescription medicine for pain, take it as prescribed. ? If you are not taking a prescription pain medicine, ask your doctor if you can take an over-the-counter medicine. To prevent cellulitis in the future  · Try to prevent cuts, scrapes, or other injuries to your skin. Cellulitis most often occurs where there is a break in the skin. · If you get a scrape, cut, mild burn, or bite, wash the wound with clean water as soon as you can to help avoid infection. Don't use hydrogen peroxide or alcohol, which can slow healing. · If you have swelling in your legs (edema), support stockings and good skin care may help prevent leg sores and cellulitis. · Take care of your feet, especially if you have diabetes or other conditions that increase the risk of infection. Wear shoes and socks. Do not go barefoot. If you have athlete's foot or other skin problems on your feet, talk to your doctor about how to treat them. When should you call for help? Call your doctor now or seek immediate medical care if:    · You have signs that your infection is getting worse, such as:  ? Increased pain, swelling, warmth, or redness. ? Red streaks leading from the area. ? Pus draining from the area. ? A fever.     · You get a rash.    Watch closely for changes in your health, and be sure to contact your doctor if:    · You do not get better as expected. Where can you learn more? Go to http://jac-jeff.info/. Sonia Davis in the search box to learn more about \"Cellulitis: Care Instructions. \"  Current as of: April 18, 2018  Content Version: 11.8  © 1983-7219 [employer].  Care instructions adapted under license by [employer] (which disclaims liability or warranty for this information). If you have questions about a medical condition or this instruction, always ask your healthcare professional. [address] any warranty or liability for your use of this information. Lymphedema: Care Instructions  Your Care Instructions    Lymphedema is fluid that builds up in the arms or legs. It is often caused by surgery to remove lymph nodes during cancer treatment, especially breast cancer surgery, which can cause fluid to build up in the arm. It can happen after radiation treatment to an area that involves lymph nodes. It also can be caused by a fractured bone or surgery to fix a fracture. And some medicines also can cause lymphedema. Some people get it for unknown reasons. Normally, lymph nodes trap bacteria and other substances as fluid flows through them. Then, the white cells in the body's defense, or immune, system can destroy the substances. But if there are few or no lymph nodes--or if the lymph system in an arm or leg has been damaged--fluid can build up in the affected arm or leg. You can take simple steps at home to help treat or prevent fluid buildup. Treatment may include raising the arm or leg to let gravity drain the fluid. You also can wear compression stockings or sleeves. Follow-up care is a key part of your treatment and safety. Be sure to make and go to all appointments, and call your doctor if you are having problems. It's also a good idea to know your test results and keep a list of the medicines you take. How can you care for yourself at home? · Wear a compression stocking or sleeve as your doctor suggests. It can help keep fluid from pooling in an arm or leg. Wear it during air travel. · Prop up the swollen arm or leg on a pillow anytime you sit or lie down. Try to keep it above the level of your heart. This will help reduce swelling. · Avoid crossing your legs if your legs are swollen.   · Get some exercise on most days of the week. Increase the intensity of exercise slowly. Water aerobics can help reduce swelling by helping fluid move around. Wear your compression stocking or sleeve during exercise, but not during water exercise. · See a physical therapist. He or she can teach you how to do self-massage to help fluid move around. You also can learn what activities would be best for you. · Keep your feet clean and wear clean socks or stockings every day. Check your feet often for signs of infection, such as redness or heat. Do not walk barefoot. · If you have had lymph nodes removed from under your arm:  ? Do not have blood drawn from the arm on the side of the lymph node surgery. ? Do not allow a blood pressure cuff to be placed on that arm. If you are in the hospital, make sure your nurse and other hospital staff know of your condition. ? Wear gloves when gardening or doing other activities that may lead to cuts on your fingers or hands. · If you have had lymph nodes removed from your groin:  ? Bathe your feet daily in lukewarm, not hot, water. Use a mild soap that has a moisturizer, or use a moisturizer separately. ? Check your feet for blisters or cuts. ? Wear comfortable and supportive shoes that fit properly. ? Wear the correct size of panty hose and stockings. Avoid garters or knee-high or thigh-high stockings. · Ask your doctor how to treat any cuts, scratches, insect bites, or other injuries that may occur. · Use sunscreen and insect repellent when outdoors to protect your skin from sunburn and insect bites. · Wear medical alert jewelry that says you have lymphedema. You can buy these at most [employer] and on the Internet. When should you call for help? Call your doctor now or seek immediate medical care if:    · You have signs of infection, such as:  ? Increased pain, swelling, warmth, or redness. ? Red streaks leading from the area. ? Pus draining from the area.   ? A fever.    Watch closely for changes in your health, and be sure to contact your doctor if:    · You have new or worse symptoms from lymphedema.     · You do not get better as expected. Where can you learn more? Go to http://jac-jeff.info/. Enter V398 in the search box to learn more about \"Lymphedema: Care Instructions. \"  Current as of: March 28, 2018  Content Version: 11.8  © 8070-2798 [employer]. Care instructions adapted under license by [employer] (which disclaims liability or warranty for this information). If you have questions about a medical condition or this instruction, always ask your healthcare professional. [place] [address] any warranty or liability for your use of this information. Venous Skin Ulcer: Care Instructions  Your Care Instructions  A venous skin ulcer is a shallow wound that develops when the leg veins do not move blood back to the heart normally. Your veins have one-way valves that keep blood flowing toward the heart. When the valves are damaged, the blood can back up and pool in the vein. The blood may leak out of the vein into tissue around the vein. The tissue can break down and form an ulcer. The first sign of a venous skin ulcer is skin that turns dark red or purple over the area where the blood is leaking out of the vein. The skin also may become thick, dry, and itchy. Without treatment, an ulcer may form. The ulcer may be painful. Your leg also may swell and ache. If the ulcer becomes infected, the infection may cause an odor, and pus may drain from the ulcer. The area around the ulcer also may be more tender and red. Follow-up care is a key part of your treatment and safety. Be sure to make and go to all appointments, and call your doctor if you are having problems. It's also a good idea to know your test results and keep a list of the medicines you take. How can you care for yourself at home?   · Follow your doctor's instructions on how to clean the ulcer and change the bandage. · If your doctor prescribed antibiotics, take them as directed. Do not stop taking them just because you feel better. You need to take the full course of antibiotics. · Lift your legs above the level of your heart as often as possible. For example, lie down and then prop up your legs with pillows. · Wear compression stockings or bandages. They help the blood circulate in your legs. And they help prevent blood from pooling in your legs. But there are different types of stockings, and they need to fit right. So your doctor will recommend what you need. · After your ulcer has healed, continue to wear compression stockings. Take them off only when you bathe and sleep. Compression helps your blood circulate and helps prevent other ulcers from forming. · Walk daily. Walking helps your blood circulation. When should you call for help? Call your doctor now or seek immediate medical care if:    · You have symptoms of infection, such as:  ? Increased pain, swelling, warmth, or redness. ? Red streaks leading from the ulcer. ? Pus draining from the ulcer. ? A fever.    Watch closely for changes in your health, and be sure to contact your doctor if:    · Your ulcer is not healing.     · You have new ulcers.     · The ulcer starts to bleed, and blood soaks through the bandage. Oozing small amounts of a mix of blood and fluid is normal.     · You have new bleeding.     · You do not get better as expected. Where can you learn more? Go to http://jac-jeff.info/. Enter A250 in the search box to learn more about \"Venous Skin Ulcer: Care Instructions. \"  Current as of: November 20, 2017  Content Version: 11.8  © 4282-1842 [employer]. Care instructions adapted under license by [employer] (which disclaims liability or warranty for this information).  If you have questions about a medical condition or this instruction, always ask your healthcare professional. [place] [address] any warranty or liability for your use of this information. Please keep your follow-up appointment with Divina Hutchinson MD.     Follow-up Disposition:  Return in about 2 months (around 1/28/2019), or if symptoms worsen or fail to improve, for w/Dr. Kay Fuentes for depression, HTN,  lymphadema. Health Maintenance Due   Topic Date Due    Shingrix Vaccine Age 50> (1 of 2) 10/10/2012    PAP AKA CERVICAL CYTOLOGY  11/20/2016    BREAST CANCER SCRN MAMMOGRAM  06/29/2017    FOBT Q 1 YEAR, 18+  04/10/2018       I have discussed the diagnosis with the patient and the intended plan as seen in the above orders. Patient is in agreement. The patient has received an after-visit summary and questions were answered concerning future plans. I have discussed medication side effects and warnings with the patient as well. Warning signs for the above conditions were discussed including when to call our office or go to the emergency room. The nurse provided the patient and/or family with advanced directive information if needed and encouraged the patient to provide a copy to the office when available.

## 2018-11-28 ENCOUNTER — OFFICE VISIT (OUTPATIENT)
Dept: INTERNAL MEDICINE CLINIC | Facility: CLINIC | Age: 58
End: 2018-11-28

## 2018-11-28 VITALS
HEART RATE: 99 BPM | BODY MASS INDEX: 50.02 KG/M2 | TEMPERATURE: 98.8 F | WEIGHT: 293 LBS | SYSTOLIC BLOOD PRESSURE: 146 MMHG | RESPIRATION RATE: 18 BRPM | DIASTOLIC BLOOD PRESSURE: 76 MMHG | HEIGHT: 64 IN | OXYGEN SATURATION: 96 %

## 2018-11-28 DIAGNOSIS — L97.311 VENOUS STASIS ULCER OF RIGHT ANKLE LIMITED TO BREAKDOWN OF SKIN, UNSPECIFIED WHETHER VARICOSE VEINS PRESENT (HCC): ICD-10-CM

## 2018-11-28 DIAGNOSIS — I83.013 VENOUS STASIS ULCER OF RIGHT ANKLE LIMITED TO BREAKDOWN OF SKIN, UNSPECIFIED WHETHER VARICOSE VEINS PRESENT (HCC): ICD-10-CM

## 2018-11-28 DIAGNOSIS — F41.8 DEPRESSION WITH ANXIETY: ICD-10-CM

## 2018-11-28 DIAGNOSIS — I89.0 LYMPHEDEMA OF BOTH LOWER EXTREMITIES: Primary | ICD-10-CM

## 2018-11-28 DIAGNOSIS — L03.116 CELLULITIS OF LEFT LOWER EXTREMITY: ICD-10-CM

## 2018-11-28 RX ORDER — DOXYCYCLINE 100 MG/1
100 TABLET ORAL 2 TIMES DAILY
Qty: 20 TAB | Refills: 0 | Status: SHIPPED | OUTPATIENT
Start: 2018-11-28 | End: 2018-12-08

## 2018-11-28 RX ORDER — FLUOXETINE HYDROCHLORIDE 40 MG/1
40 CAPSULE ORAL DAILY
Qty: 30 CAP | Refills: 3 | Status: CANCELLED | OUTPATIENT
Start: 2018-11-28

## 2018-11-28 NOTE — PATIENT INSTRUCTIONS
Please contact our office regarding what you decide about your depression medication. We can send in a new one to your pharmacy or refill the Effexor if you would like to resume that. Please keep your wound clinic appt. Cellulitis: Care Instructions  Your Care Instructions    Cellulitis is a skin infection caused by bacteria, most often strep or staph. It often occurs after a break in the skin from a scrape, cut, bite, or puncture, or after a rash. Cellulitis may be treated without doing tests to find out what caused it. But your doctor may do tests, if needed, to look for a specific bacteria, like methicillin-resistant Staphylococcus aureus (MRSA). The doctor has checked you carefully, but problems can develop later. If you notice any problems or new symptoms, get medical treatment right away. Follow-up care is a key part of your treatment and safety. Be sure to make and go to all appointments, and call your doctor if you are having problems. It's also a good idea to know your test results and keep a list of the medicines you take. How can you care for yourself at home? · Take your antibiotics as directed. Do not stop taking them just because you feel better. You need to take the full course of antibiotics. · Prop up the infected area on pillows to reduce pain and swelling. Try to keep the area above the level of your heart as often as you can. · If your doctor told you how to care for your wound, follow your doctor's instructions. If you did not get instructions, follow this general advice:  ? Wash the wound with clean water 2 times a day. Don't use hydrogen peroxide or alcohol, which can slow healing. ? You may cover the wound with a thin layer of petroleum jelly, such as Vaseline, and a nonstick bandage. ? Apply more petroleum jelly and replace the bandage as needed. · Be safe with medicines. Take pain medicines exactly as directed.   ? If the doctor gave you a prescription medicine for pain, take it as prescribed. ? If you are not taking a prescription pain medicine, ask your doctor if you can take an over-the-counter medicine. To prevent cellulitis in the future  · Try to prevent cuts, scrapes, or other injuries to your skin. Cellulitis most often occurs where there is a break in the skin. · If you get a scrape, cut, mild burn, or bite, wash the wound with clean water as soon as you can to help avoid infection. Don't use hydrogen peroxide or alcohol, which can slow healing. · If you have swelling in your legs (edema), support stockings and good skin care may help prevent leg sores and cellulitis. · Take care of your feet, especially if you have diabetes or other conditions that increase the risk of infection. Wear shoes and socks. Do not go barefoot. If you have athlete's foot or other skin problems on your feet, talk to your doctor about how to treat them. When should you call for help? Call your doctor now or seek immediate medical care if:    · You have signs that your infection is getting worse, such as:  ? Increased pain, swelling, warmth, or redness. ? Red streaks leading from the area. ? Pus draining from the area. ? A fever.     · You get a rash.    Watch closely for changes in your health, and be sure to contact your doctor if:    · You do not get better as expected. Where can you learn more? Go to http://jac-jeff.info/. Cherelle Reinoso in the search box to learn more about \"Cellulitis: Care Instructions. \"  Current as of: April 18, 2018  Content Version: 11.8  © 2400-9510 [employer]. Care instructions adapted under license by [employer] (which disclaims liability or warranty for this information). If you have questions about a medical condition or this instruction, always ask your healthcare professional. [address] any warranty or liability for your use of this information.          Lymphedema: Care Instructions  Your Care Instructions    Lymphedema is fluid that builds up in the arms or legs. It is often caused by surgery to remove lymph nodes during cancer treatment, especially breast cancer surgery, which can cause fluid to build up in the arm. It can happen after radiation treatment to an area that involves lymph nodes. It also can be caused by a fractured bone or surgery to fix a fracture. And some medicines also can cause lymphedema. Some people get it for unknown reasons. Normally, lymph nodes trap bacteria and other substances as fluid flows through them. Then, the white cells in the body's defense, or immune, system can destroy the substances. But if there are few or no lymph nodes--or if the lymph system in an arm or leg has been damaged--fluid can build up in the affected arm or leg. You can take simple steps at home to help treat or prevent fluid buildup. Treatment may include raising the arm or leg to let gravity drain the fluid. You also can wear compression stockings or sleeves. Follow-up care is a key part of your treatment and safety. Be sure to make and go to all appointments, and call your doctor if you are having problems. It's also a good idea to know your test results and keep a list of the medicines you take. How can you care for yourself at home? · Wear a compression stocking or sleeve as your doctor suggests. It can help keep fluid from pooling in an arm or leg. Wear it during air travel. · Prop up the swollen arm or leg on a pillow anytime you sit or lie down. Try to keep it above the level of your heart. This will help reduce swelling. · Avoid crossing your legs if your legs are swollen. · Get some exercise on most days of the week. Increase the intensity of exercise slowly. Water aerobics can help reduce swelling by helping fluid move around. Wear your compression stocking or sleeve during exercise, but not during water exercise.   · See a physical therapist. He or she can teach you how to do self-massage to help fluid move around. You also can learn what activities would be best for you. · Keep your feet clean and wear clean socks or stockings every day. Check your feet often for signs of infection, such as redness or heat. Do not walk barefoot. · If you have had lymph nodes removed from under your arm:  ? Do not have blood drawn from the arm on the side of the lymph node surgery. ? Do not allow a blood pressure cuff to be placed on that arm. If you are in the hospital, make sure your nurse and other hospital staff know of your condition. ? Wear gloves when gardening or doing other activities that may lead to cuts on your fingers or hands. · If you have had lymph nodes removed from your groin:  ? Bathe your feet daily in lukewarm, not hot, water. Use a mild soap that has a moisturizer, or use a moisturizer separately. ? Check your feet for blisters or cuts. ? Wear comfortable and supportive shoes that fit properly. ? Wear the correct size of panty hose and stockings. Avoid garters or knee-high or thigh-high stockings. · Ask your doctor how to treat any cuts, scratches, insect bites, or other injuries that may occur. · Use sunscreen and insect repellent when outdoors to protect your skin from sunburn and insect bites. · Wear medical alert jewelry that says you have lymphedema. You can buy these at most drugstores and on the Internet. When should you call for help? Call your doctor now or seek immediate medical care if:    · You have signs of infection, such as:  ? Increased pain, swelling, warmth, or redness. ? Red streaks leading from the area. ? Pus draining from the area. ? A fever.    Watch closely for changes in your health, and be sure to contact your doctor if:    · You have new or worse symptoms from lymphedema.     · You do not get better as expected. Where can you learn more? Go to http://jac-jeff.info/.   Enter [phone] in the search box to learn more about \"Lymphedema: Care Instructions. \"  Current as of: March 28, 2018  Content Version: 11.8  © 5890-4741 [employer]. Care instructions adapted under license by [employer] (which disclaims liability or warranty for this information). If you have questions about a medical condition or this instruction, always ask your healthcare professional. [address] any warranty or liability for your use of this information. Venous Skin Ulcer: Care Instructions  Your Care Instructions  A venous skin ulcer is a shallow wound that develops when the leg veins do not move blood back to the heart normally. Your veins have one-way valves that keep blood flowing toward the heart. When the valves are damaged, the blood can back up and pool in the vein. The blood may leak out of the vein into tissue around the vein. The tissue can break down and form an ulcer. The first sign of a venous skin ulcer is skin that turns dark red or purple over the area where the blood is leaking out of the vein. The skin also may become thick, dry, and itchy. Without treatment, an ulcer may form. The ulcer may be painful. Your leg also may swell and ache. If the ulcer becomes infected, the infection may cause an odor, and pus may drain from the ulcer. The area around the ulcer also may be more tender and red. Follow-up care is a key part of your treatment and safety. Be sure to make and go to all appointments, and call your doctor if you are having problems. It's also a good idea to know your test results and keep a list of the medicines you take. How can you care for yourself at home? · Follow your doctor's instructions on how to clean the ulcer and change the bandage. · If your doctor prescribed antibiotics, take them as directed. Do not stop taking them just because you feel better. You need to take the full course of antibiotics.   · Lift your legs above the level of your heart as often as possible. For example, lie down and then prop up your legs with pillows. · Wear compression stockings or bandages. They help the blood circulate in your legs. And they help prevent blood from pooling in your legs. But there are different types of stockings, and they need to fit right. So your doctor will recommend what you need. · After your ulcer has healed, continue to wear compression stockings. Take them off only when you bathe and sleep. Compression helps your blood circulate and helps prevent other ulcers from forming. · Walk daily. Walking helps your blood circulation. When should you call for help? Call your doctor now or seek immediate medical care if:    · You have symptoms of infection, such as:  ? Increased pain, swelling, warmth, or redness. ? Red streaks leading from the ulcer. ? Pus draining from the ulcer. ? A fever.    Watch closely for changes in your health, and be sure to contact your doctor if:    · Your ulcer is not healing.     · You have new ulcers.     · The ulcer starts to bleed, and blood soaks through the bandage. Oozing small amounts of a mix of blood and fluid is normal.     · You have new bleeding.     · You do not get better as expected. Where can you learn more? Go to http://jac-jeff.info/. Enter U759 in the search box to learn more about \"Venous Skin Ulcer: Care Instructions. \"  Current as of: November 20, 2017  Content Version: 11.8  © 2749-9403 [employer]. Care instructions adapted under license by [employer] (which disclaims liability or warranty for this information). If you have questions about a medical condition or this instruction, always ask your healthcare professional. [place] [address] any warranty or liability for your use of this information.

## 2018-11-28 NOTE — PROGRESS NOTES
[facility]  Identified pt with two pt identifiers(name and ). Chief Complaint   Patient presents with    Leg Injury     sore on legs, went to ER, Apt on  for wound clinic, stopped antidepressant       Reviewed record In preparation for visit and have obtained necessary documentation. Has info on advanced directive but has not filled them out. 1. Have you been to the ER, urgent care clinic or hospitalized since your last visit? No     2. Have you seen or consulted any other health care providers outside of the [address] [place] [address] since your last visit? Include any pap smears or colon screening. No    Vitals reviewed with provider.     Health Maintenance reviewed:     Health Maintenance Due   Topic    Shingrix Vaccine Age 50> (1 of 2)    PAP AKA CERVICAL CYTOLOGY     BREAST CANCER SCRN MAMMOGRAM     FOBT Q 1 YEAR, 18+           Wt Readings from Last 3 Encounters:   18 316 lb 12.8 oz (143.7 kg)   10/27/18 324 lb 4.8 oz (147.1 kg)   18 322 lb (146.1 kg)        Temp Readings from Last 3 Encounters:   18 98.8 °F (37.1 °C) (Oral)   10/27/18 98.9 °F (37.2 °C)   18 98.8 °F (37.1 °C) (Oral)        BP Readings from Last 3 Encounters:   18 146/76   10/27/18 157/67   18 133/82        Pulse Readings from Last 3 Encounters:   18 99   10/27/18 89   18 (!) 107        Vitals:    18 1536   BP: 146/76   Pulse: 99   Resp: 18   Temp: 98.8 °F (37.1 °C)   TempSrc: Oral   SpO2: 96%   Weight: 316 lb 12.8 oz (143.7 kg)   Height: 5' 4\" (1.626 m)          Learning Assessment:   :       Learning Assessment 2015 3/26/2014   PRIMARY LEARNER Patient Patient   HIGHEST LEVEL OF EDUCATION - PRIMARY LEARNER  GRADUATED HIGH SCHOOL OR GED GRADUATED HIGH SCHOOL OR GED   BARRIERS PRIMARY LEARNER NONE NONE   CO-LEARNER CAREGIVER No No   PRIMARY LANGUAGE ENGLISH ENGLISH   LEARNER PREFERENCE PRIMARY LISTENING VIDEOS   ANSWERED BY patient patient   RELATIONSHIP SELF SELF        Depression Screening:   :       PHQ over the last two weeks 6/26/2018   Little interest or pleasure in doing things Nearly every day   Feeling down, depressed, irritable, or hopeless Nearly every day   Total Score PHQ 2 6   Trouble falling or staying asleep, or sleeping too much Not at all   Feeling tired or having little energy Nearly every day   Poor appetite, weight loss, or overeating Nearly every day   Feeling bad about yourself - or that you are a failure or have let yourself or your family down Several days   Trouble concentrating on things such as school, work, reading, or watching TV Nearly every day   Moving or speaking so slowly that other people could have noticed; or the opposite being so fidgety that others notice Not at all   Thoughts of being better off dead, or hurting yourself in some way Not at all   PHQ 9 Score 16        Fall Risk Assessment:   :     No flowsheet data found. Abuse Screening:   :     No flowsheet data found.      ADL Screening:   :       ADL Assessment 3/27/2018   Feeding yourself No Help Needed   Getting from bed to chair No Help Needed   Getting dressed No Help Needed   Bathing or showering No Help Needed   Walk across the room (includes cane/walker) Help Needed   Using the telphone No Help Needed   Taking your medications No Help Needed   Preparing meals No Help Needed   Managing money (expenses/bills) No Help Needed   Moderately strenuous housework (laundry) No Help Needed   Shopping for personal items (toiletries/medicines) No Help Needed   Shopping for groceries No Help Needed   Driving No Help Needed   Climbing a flight of stairs No Help Needed   Getting to places beyond walking distances Help Needed

## 2018-12-03 LAB — BACTERIA SPEC AEROBE CULT: ABNORMAL

## 2018-12-04 DIAGNOSIS — F41.1 GENERALIZED ANXIETY DISORDER: ICD-10-CM

## 2018-12-04 DIAGNOSIS — F32.1 MODERATE MAJOR DEPRESSION (HCC): Primary | ICD-10-CM

## 2018-12-04 RX ORDER — FLUOXETINE HYDROCHLORIDE 20 MG/1
20 CAPSULE ORAL DAILY
Qty: 30 CAP | Refills: 1 | Status: SHIPPED | OUTPATIENT
Start: 2018-12-04 | End: 2019-01-29 | Stop reason: DRUGHIGH

## 2018-12-05 ENCOUNTER — TELEPHONE (OUTPATIENT)
Dept: INTERNAL MEDICINE CLINIC | Facility: CLINIC | Age: 58
End: 2018-12-05

## 2018-12-05 NOTE — TELEPHONE ENCOUNTER
Patient called office related to seeing culture results in My Chart. Patient advised per Hershel Duane NP that the wound culture was positive for staph and doxycycline she is taking should cover the infection. Patient stated she did not go to the appt at the wound care center as she locked herself out the house. She has rescheduled this appt but it is for latter in the middle of the month. Patient advised to continue antibiotic and to call wound care center to get in sooner.

## 2018-12-06 ENCOUNTER — HOSPITAL ENCOUNTER (OUTPATIENT)
Dept: WOUND CARE | Age: 58
Discharge: HOME | End: 2018-12-06
Payer: SELF-PAY

## 2018-12-06 VITALS
RESPIRATION RATE: 16 BRPM | SYSTOLIC BLOOD PRESSURE: 164 MMHG | TEMPERATURE: 98.1 F | HEART RATE: 90 BPM | DIASTOLIC BLOOD PRESSURE: 76 MMHG

## 2018-12-06 PROBLEM — I89.0 LYMPHEDEMA: Status: ACTIVE | Noted: 2018-12-06

## 2018-12-06 PROBLEM — I87.333 CHRONIC VENOUS HYPERTENSION (IDIOPATHIC) WITH ULCER AND INFLAMMATION OF BILATERAL LOWER EXTREMITY (CODE) (HCC): Status: ACTIVE | Noted: 2018-12-06

## 2018-12-06 PROBLEM — L03.116 CELLULITIS OF LEFT LOWER EXTREMITY WITHOUT FOOT: Status: ACTIVE | Noted: 2018-12-06

## 2018-12-06 PROBLEM — L03.115 CELLULITIS OF RIGHT LOWER EXTREMITY WITHOUT FOOT: Status: ACTIVE | Noted: 2018-12-06

## 2018-12-06 PROCEDURE — 99214 OFFICE O/P EST MOD 30 MIN: CPT

## 2018-12-06 PROCEDURE — 11042 DBRDMT SUBQ TIS 1ST 20SQCM/<: CPT

## 2018-12-06 NOTE — WOUND CARE
12/06/18 1437   Wound Leg Lower Medial;Right   Date First Assessed/Time First Assessed: 12/06/18 1431   POA: Yes  Wound Type: Blister/bullae;Vascular  Location: Leg Lower  Orientation: Medial;Right   DRESSING TYPE Open to air   Non-Pressure Injury Full thickness (subcut/muscle)   Wound Length (cm) 2 cm   Wound Width (cm) 1.5 cm   Wound Depth (cm) 0.1   Wound Surface area (cm^2) 3 cm^2   Condition of Base Slough   Condition of Edges Open   Drainage Amount  Moderate   Drainage Color Tan   Wound Odor None   Periwound Skin Condition Intact   Cleansing and Cleansing Agents  Sodium hypochlorite 25 %  (leave it 2 min and rinse)   Dressing Type Applied Gauze;Gauze wrap (rosina); Special tape (comment)  (Gentamycin,Aquacel Ag, A&D INtact skin, Double Tubi  G)   Wound Procedure Type Debridement- Surgical   Procedure Time Out 1427   Procedure Faces (Caruso-Baker) Scale 1 0   Post Procedure Procedure Pain Scale Numeric 4/10   Procedure Tolerated Fair   Wound Leg Lower Left   Date First Assessed/Time First Assessed: 12/06/18 1436   POA: Yes  Wound Type: Blister/bullae;Vascular  Location: Leg Lower  Orientation: Left   DRESSING TYPE Open to air   Wound Length (cm) 3 cm   Wound Width (cm) 3.5 cm   Wound Depth (cm) 0.1   Wound Surface area (cm^2) 10.5 cm^2   Condition of Base Slough   Condition of Edges Open   Dressing Type Applied Gauze;Gauze wrap (rosina); Special tape (comment)  (Gentamycin,Aquacel Ag, A&D INtact skin, Double Tubi  G)   Wound Procedure Type Debridement- Surgical   Consent Obtained  Yes   Procedure Anesthia  Topical lidocaine   Procedure Instrument Curette #5   Procedure Bleeding Yes   Hemostasis 4x4   Procedure Pain Scale Numeric 0/10   Post Procedure Procedure Pain Scale Numeric 4/10   Procedure Tolerated Fair     Visit Vitals  /76 (BP 1 Location: Right arm)   Pulse 90   Temp 98.1 °F (36.7 °C)   Resp 16   Breastfeeding?  No     LLE Peripheral Vascular   Capillary Refill: Less than/equal to 3 seconds (12/06/18 [facility])  Color: Appropriate for race (12/06/18 [facility])  Temperature: Warm (12/06/18 [facility])  Sensation: Present (12/06/18 [facility])  Pedal Pulse: Palpable (12/06/18 [facility])  Circumference of Calf (cm): 51 cm (12/06/18 [facility])  Location of Measurement (Calf): Mid  (12/06/18 [facility])  Circumference of Ankle (cm): 31 cm (12/06/18 [facility])  Location of Measurement (Ankle): Upper  (12/06/18 [facility])  RLE Peripheral Vascular   Capillary Refill: Less than/equal to 3 seconds (12/06/18 [facility])  Color: Appropriate for race (12/06/18 [facility])  Temperature: Warm (12/06/18 [facility])  Sensation: Present (12/06/18 [facility])  Pedal Pulse: Palpable (12/06/18 [facility])  Circumference of Calf (cm): 48.5 cm (12/06/18 [facility])  Location of Measurement (Calf): Mid  (12/06/18 [facility])  Circumference of Ankle (cm): 32 cm (12/06/18 [facility])  Location of Measurement (Ankle): Upper  (12/06/18 [facility])    Patient was discharged with Self to home and was ambulatory/cane.  In stable condition with c/o pain:_4        _/10_

## 2018-12-06 NOTE — PROGRESS NOTES
Problem: Lower Extremity Wound Care  Goal: *Non-infected wound: Improvement of existing wound, absence of infection, and maintenance of skin integrity  Outcome: Progressing Towards Goal  Culture send   Venous study and RICK

## 2018-12-06 NOTE — PROGRESS NOTES
Chief Complaint (CC): non healing wounds BLEs. Present Illness (PI): noted the LLE with a 'blister' in mid October, this opened and extended, got red and increased pain, 2-3 weeks ago a smaller one was noted on the innner RLE. Daniel China Pertinent Past History (PMedHx): no diabetes, has depression, fibromyalgia, Osteoarthritis needing neck and back disc surgery and Left hip surgery, says she has a meniscal problem R knee also. Daniel Atkinsno Also noted:                         Medications and Allergies: as per [facility]. I have reviewed and concur. Illnesses: as per '[facility]' recorded data noted today. Surgeries and Injuries: as per '[facility]' recorded data noted today. Review of Systems (ROS):                        Integumentary: Other than as noted in 'PI'; skin hair and nails normal for age, with no new rash, lumps, bumps, eruptions or bleeding. Lymph: no new prominent nodes or drainage near lymph nodes. Bones, Joints, and Muscles: Other than as noted in 'PI' no new fractures, dislocations, weakness or pain. As above, . Hematopoietic: no new bleeding or bruising or anemia changes. .                        Eyes: no recent trauma or inflammation. no. Eye glasses. no. Intra Occular Lens Implants (IOLI)                        Ears: Hearing is unchanged and usually good. Nose: no new drainage, rhinorhea or epistaxis. Mouth, and throat: no soreness, drainage or lesions. . Dentures. Neck: no new masses, drainage or pain                        Respiratory; no hemoptysis, current shortness of breath or pain with breath. Cardiovascular: No chest pain, palpitation or tachycardia. Has been told that she has fluid retention problems.                         Gastrointestinal: no recent change in appetite, stools or food tolerance. No jaundice, hematemesis, vomiting or diarrhea                        Genito-Urinary: urine color, frequency, sensation unchanged                        Neurologic: no syncope, dizzyness or unusual sensations. Psychologic and Mental Status: no change in mood, sleep or memory recently     Social History: '[facility]' data today is noted. Lives at home, works from home. Family History: '[facility]' data today is noted. Eward Medicus Physical Exam:      General:  alert BMI > 50. Head, Eyes, Ears, Nose and Throat: normocephalic, PERRLA EOMI, TMs, mucosa benign. Neck: supple without masses or adenopathy. No bruit. Chest: full excursion without deformity, . Lungs: clear to ausc. Heart: RSR no M. Abdomen: soft round non tender. Vascular:                         Pulses:                                            R                    L                                               Radial                        +.                 +.                                              Femoral                     +.                 +.                                              DP                             +.                 +.                                              PT                             -.                 -.  Extremities: Medial RLE, Lateral LLE ulceration with red surround through subQ with suppuration and crusting to the borders, 2+ pitting edema BLEs to above knees, L>>R, non pitting edema with skin thickening, weeping and skin breakdown in BLEs, L> R. .  Other: cries easily. Vital signs and data recorded in '[facility]' for this patient today is noted, reviewed and considered. Patient notes today: pain today. Procedure Note     Name of Procedure: sharp debridement. PreOp diagnosis: stasis ulceration, lymphedema. .  Anaesthesia: Lidocaine; topical   Description: using as sharp curette I removed all adherent debris, crusting and slough through SubQ level to effect a clean bleeding base. .  Most extreme level of debridement: subQ. Post debridement dimensions changed as noted:    Depth, add 2.0 mm;    Width, add 0.0 mm   Length, add 0.0 mm. Blood Loss: 2. CCs. Bleeding abated post treatment . Post Op Diagnosis, and condition: painful with some atrophy around the ulcer LLE. Follow Up Plan: daily dakins cleanse, Gentamycin to ulcer base, aquacel to ulcer, double tubi BLEs. RTC 1 week. Ana Engel Specimens: C&S R and L. Counseled regarding/Discussed: as above, long discussion as to wound care, hygiene, edema Rx and plan for lymphedema Rx. Clinical considerations: as above, will also look for nutritional help. .  Future: wounds should respond to local care. Ana Engel

## 2018-12-12 LAB
BACTERIA SPEC AEROBE CULT: ABNORMAL
BACTERIA SPEC AEROBE CULT: ABNORMAL

## 2018-12-13 ENCOUNTER — HOSPITAL ENCOUNTER (OUTPATIENT)
Dept: WOUND CARE | Age: 58
Discharge: HOME | End: 2018-12-13
Payer: SELF-PAY

## 2018-12-13 VITALS
RESPIRATION RATE: 18 BRPM | HEART RATE: 98 BPM | SYSTOLIC BLOOD PRESSURE: 146 MMHG | TEMPERATURE: 97.7 F | DIASTOLIC BLOOD PRESSURE: 85 MMHG

## 2018-12-13 PROCEDURE — 74011000250 HC RX REV CODE- 250: Performed by: EMERGENCY MEDICINE

## 2018-12-13 PROCEDURE — 11042 DBRDMT SUBQ TIS 1ST 20SQCM/<: CPT

## 2018-12-13 RX ORDER — LIDOCAINE HYDROCHLORIDE 20 MG/ML
JELLY TOPICAL AS NEEDED
Status: DISCONTINUED | OUTPATIENT
Start: 2018-12-13 | End: 2018-12-17 | Stop reason: HOSPADM

## 2018-12-13 RX ADMIN — LIDOCAINE HYDROCHLORIDE: 20 JELLY TOPICAL at 15:00

## 2018-12-13 NOTE — PROGRESS NOTES
I have noted, and reviewed today's data for this patient in [facility] and concur with same. The focused physical exam, other physical findings, Medical history, Review of Symptoms and Medications today remains unchanged except as noted below. Patient notes today: Loreta Bhargav it seems better. .  Lesion/Wound, focused exam on Presentation today: BLEs stasis ulcerations both with exudate and slough over all surface but both with about 35-40% skin coverage. .    Procedure:   Wound # stasis ulcerations . Procedure name: sharp debridement. Anaesthesia: Lidocaine; topical    Description: using a sharp curette I removed all adherent debris and slough to effect a clean bleeding base. .  Maximum Level/depth of debridement: subQ. Post debridement dimensions changed as noted:    Depth, add 1.0 mm;    Width, add 0.0 mm   Length, add 0.0 mm. Blood Loss: 2 CCs. Bleeding abated post treatment . Post Procedure Condition/ Diagnosis: good progress, . Follow up and Future plans today: RTC 1 week, will have vascular studies by then to plan compression going forward. Gemma Calhoun Specimens: 0. Patient Counseled regarding/Discussed: as above, . Clinical Considerations: alert to infection. Avoid trauma, continue tubi group now. Gemma Calhoun

## 2018-12-13 NOTE — WOUND CARE
12/13/18 1625   Wound Leg Lower Medial;Right   Date First Assessed/Time First Assessed: 12/06/18 1431   POA: Yes  Wound Type: Blister/bullae;Vascular  Location: Leg Lower  Orientation: Medial;Right   DRESSING STATUS Removed

## 2018-12-14 NOTE — WOUND CARE
12/13/18 1625   Wound Leg Lower Medial;Right   Date First Assessed/Time First Assessed: 12/06/18 1431   POA: Yes  Wound Type: Blister/bullae;Vascular  Location: Leg Lower  Orientation: Medial;Right   DRESSING STATUS Removed   DRESSING TYPE (, Aquacel AG, 4x4s, roll gauze, double layer tubigrip)   Wound Length (cm) 1.3 cm   Wound Width (cm) 1 cm   Wound Depth (cm) 0.1   Wound Surface area (cm^2) 1.3 cm^2   Change in Wound Size % 56.67   Condition of Base Pink;Slough   Condition of Edges Open   Drainage Amount  Small    Drainage Color Serous   Wound Odor None   Cleansing and Cleansing Agents  Normal saline; Soap and water   Dressing Type Applied (Aquacel AG. 4x4s. roll gauze, double layer tubigrip)   Wound Procedure Type Debridement- Surgical   Procedure Time Out 9844   Consent Obtained  Yes   Procedure Anesthia  (Lidocaine 2%)   Procedure Instrument (Curette #5)   Procedure Bleeding Yes   Hemostasis 4x4s   Procedure Pain Scale Numeric 5/10   Post Procedure Procedure Pain Scale Numeric 3/10   Procedure Tolerated Fair   Wound Leg Lower Left   Date First Assessed/Time First Assessed: 12/06/18 1436   POA: Yes  Wound Type: Blister/bullae;Vascular  Location: Leg Lower  Orientation: Left   DRESSING STATUS Removed   DRESSING TYPE 4 x 4;Aquacel;Gauze; Tubular dressing   Wound Length (cm) 3.2 cm   Wound Width (cm) 3.4 cm   Wound Depth (cm) 0.1   Wound Surface area (cm^2) 10.88 cm^2   Change in Wound Size % -3.62   Condition of Base Pink;Slough   Condition of Edges Open   Drainage Amount  Small    Drainage Color Serous   Wound Odor None   Periwound Skin Condition Intact   Cleansing and Cleansing Agents  Normal saline; Soap and water   Dressing Type Applied (Aquacel AG. 4x4s. roll gauze, double layer tubigrip)   Patient discharged to home ambulatory, will return to clinic in 2 weeks.

## 2018-12-27 ENCOUNTER — APPOINTMENT (OUTPATIENT)
Dept: WOUND CARE | Age: 58
End: 2018-12-27
Payer: SELF-PAY

## 2019-01-10 ENCOUNTER — APPOINTMENT (OUTPATIENT)
Dept: ULTRASOUND IMAGING | Age: 59
End: 2019-01-10
Attending: EMERGENCY MEDICINE
Payer: MEDICAID

## 2019-01-10 ENCOUNTER — HOSPITAL ENCOUNTER (OUTPATIENT)
Dept: VASCULAR SURGERY | Age: 59
Discharge: HOME | End: 2019-01-10
Attending: EMERGENCY MEDICINE
Payer: MEDICAID

## 2019-01-10 DIAGNOSIS — L03.115 CELLULITIS OF RIGHT LEG: ICD-10-CM

## 2019-01-10 DIAGNOSIS — L03.116 CELLULITIS OF LEFT LEG: ICD-10-CM

## 2019-01-10 PROCEDURE — 93922 UPR/L XTREMITY ART 2 LEVELS: CPT

## 2019-01-24 ENCOUNTER — HOSPITAL ENCOUNTER (OUTPATIENT)
Dept: ULTRASOUND IMAGING | Age: 59
Discharge: HOME | End: 2019-01-24
Attending: EMERGENCY MEDICINE
Payer: MEDICAID

## 2019-01-24 DIAGNOSIS — I89.0 LYMPHATIC EDEMA: ICD-10-CM

## 2019-01-24 DIAGNOSIS — L97.919 IDIOPATHIC CHRONIC VENOUS HYPERTENSION OF BOTH LOWER EXTREMITIES WITH ULCER AND INFLAMMATION (HCC): ICD-10-CM

## 2019-01-24 DIAGNOSIS — I87.333 IDIOPATHIC CHRONIC VENOUS HYPERTENSION OF BOTH LOWER EXTREMITIES WITH ULCER AND INFLAMMATION (HCC): ICD-10-CM

## 2019-01-24 DIAGNOSIS — L97.929 IDIOPATHIC CHRONIC VENOUS HYPERTENSION OF BOTH LOWER EXTREMITIES WITH ULCER AND INFLAMMATION (HCC): ICD-10-CM

## 2019-01-24 PROCEDURE — 93970 EXTREMITY STUDY: CPT

## 2019-01-29 ENCOUNTER — OFFICE VISIT (OUTPATIENT)
Dept: INTERNAL MEDICINE CLINIC | Facility: CLINIC | Age: 59
End: 2019-01-29

## 2019-01-29 VITALS
TEMPERATURE: 98 F | SYSTOLIC BLOOD PRESSURE: 124 MMHG | HEIGHT: 64 IN | HEART RATE: 93 BPM | OXYGEN SATURATION: 95 % | DIASTOLIC BLOOD PRESSURE: 57 MMHG | WEIGHT: 293 LBS | RESPIRATION RATE: 16 BRPM | BODY MASS INDEX: 50.02 KG/M2

## 2019-01-29 DIAGNOSIS — Z79.899 HIGH RISK MEDICATION USE: ICD-10-CM

## 2019-01-29 DIAGNOSIS — I89.0 LYMPHEDEMA OF BOTH LOWER EXTREMITIES: ICD-10-CM

## 2019-01-29 DIAGNOSIS — F33.1 MODERATE EPISODE OF RECURRENT MAJOR DEPRESSIVE DISORDER (HCC): ICD-10-CM

## 2019-01-29 DIAGNOSIS — L03.116 CELLULITIS OF LEFT LOWER EXTREMITY: ICD-10-CM

## 2019-01-29 DIAGNOSIS — M16.12 PRIMARY OSTEOARTHRITIS OF LEFT HIP: ICD-10-CM

## 2019-01-29 DIAGNOSIS — R20.0 NUMBNESS OF FEET: ICD-10-CM

## 2019-01-29 DIAGNOSIS — I10 ESSENTIAL HYPERTENSION: Primary | ICD-10-CM

## 2019-01-29 DIAGNOSIS — M79.7 FIBROMYALGIA: ICD-10-CM

## 2019-01-29 DIAGNOSIS — R30.0 DYSURIA: ICD-10-CM

## 2019-01-29 DIAGNOSIS — E66.01 MORBID OBESITY WITH BMI OF 50.0-59.9, ADULT (HCC): ICD-10-CM

## 2019-01-29 DIAGNOSIS — F41.1 GENERALIZED ANXIETY DISORDER: ICD-10-CM

## 2019-01-29 LAB
BILIRUB UR QL STRIP: NEGATIVE
GLUCOSE UR-MCNC: NEGATIVE MG/DL
KETONES P FAST UR STRIP-MCNC: NEGATIVE MG/DL
PH UR STRIP: 5.5 [PH] (ref 4.6–8)
PROT UR QL STRIP: NEGATIVE
SP GR UR STRIP: 1.02 (ref 1–1.03)
UA UROBILINOGEN AMB POC: NORMAL (ref 0.2–1)
URINALYSIS CLARITY POC: NORMAL
URINALYSIS COLOR POC: YELLOW
URINE BLOOD POC: NEGATIVE
URINE LEUKOCYTES POC: NORMAL
URINE NITRITES POC: NEGATIVE

## 2019-01-29 RX ORDER — TRAMADOL HYDROCHLORIDE 50 MG/1
TABLET ORAL
Qty: 45 TAB | Refills: 0 | Status: SHIPPED | OUTPATIENT
Start: 2019-01-29 | End: 2019-05-10 | Stop reason: SDUPTHER

## 2019-01-29 RX ORDER — FLUOXETINE HYDROCHLORIDE 40 MG/1
40 CAPSULE ORAL DAILY
Qty: 30 CAP | Refills: 5 | Status: SHIPPED | OUTPATIENT
Start: 2019-01-29 | End: 2019-08-14 | Stop reason: SDUPTHER

## 2019-01-29 RX ORDER — CIPROFLOXACIN 250 MG/1
250 TABLET, FILM COATED ORAL 2 TIMES DAILY
Qty: 14 TAB | Refills: 0 | Status: SHIPPED | OUTPATIENT
Start: 2019-01-29 | End: 2019-02-05

## 2019-01-29 NOTE — PROGRESS NOTES
Attempted to reach patient by phone. Unable to reach patient or leave message requesting a return call. Will attempt at latter date and time. A My Chart message will be sent.

## 2019-01-29 NOTE — PATIENT INSTRUCTIONS
Lymphedema: Care Instructions  Your Care Instructions    Lymphedema is fluid that builds up in the arms or legs. It is often caused by surgery to remove lymph nodes during cancer treatment, especially breast cancer surgery, which can cause fluid to build up in the arm. It can happen after radiation treatment to an area that involves lymph nodes. It also can be caused by a fractured bone or surgery to fix a fracture. And some medicines also can cause lymphedema. Some people get it for unknown reasons. Normally, lymph nodes trap bacteria and other substances as fluid flows through them. Then, the white cells in the body's defense, or immune, system can destroy the substances. But if there are few or no lymph nodes--or if the lymph system in an arm or leg has been damaged--fluid can build up in the affected arm or leg. You can take simple steps at home to help treat or prevent fluid buildup. Treatment may include raising the arm or leg to let gravity drain the fluid. You also can wear compression stockings or sleeves. Follow-up care is a key part of your treatment and safety. Be sure to make and go to all appointments, and call your doctor if you are having problems. It's also a good idea to know your test results and keep a list of the medicines you take. How can you care for yourself at home? · Wear a compression stocking or sleeve as your doctor suggests. It can help keep fluid from pooling in an arm or leg. Wear it during air travel. · Prop up the swollen arm or leg on a pillow anytime you sit or lie down. Try to keep it above the level of your heart. This will help reduce swelling. · Avoid crossing your legs if your legs are swollen. · Get some exercise on most days of the week. Increase the intensity of exercise slowly. Water aerobics can help reduce swelling by helping fluid move around. Wear your compression stocking or sleeve during exercise, but not during water exercise.   · See a physical therapist. He or she can teach you how to do self-massage to help fluid move around. You also can learn what activities would be best for you. · Keep your feet clean and wear clean socks or stockings every day. Check your feet often for signs of infection, such as redness or heat. Do not walk barefoot. · If you have had lymph nodes removed from under your arm:  ? Do not have blood drawn from the arm on the side of the lymph node surgery. ? Do not allow a blood pressure cuff to be placed on that arm. If you are in the hospital, make sure your nurse and other hospital staff know of your condition. ? Wear gloves when gardening or doing other activities that may lead to cuts on your fingers or hands. · If you have had lymph nodes removed from your groin:  ? Bathe your feet daily in lukewarm, not hot, water. Use a mild soap that has a moisturizer, or use a moisturizer separately. ? Check your feet for blisters or cuts. ? Wear comfortable and supportive shoes that fit properly. ? Wear the correct size of panty hose and stockings. Avoid garters or knee-high or thigh-high stockings. · Ask your doctor how to treat any cuts, scratches, insect bites, or other injuries that may occur. · Use sunscreen and insect repellent when outdoors to protect your skin from sunburn and insect bites. · Wear medical alert jewelry that says you have lymphedema. You can buy these at most drugstores and on the Internet. When should you call for help? Call your doctor now or seek immediate medical care if:    · You have signs of infection, such as:  ? Increased pain, swelling, warmth, or redness. ? Red streaks leading from the area. ? Pus draining from the area. ? A fever.    Watch closely for changes in your health, and be sure to contact your doctor if:    · You have new or worse symptoms from lymphedema.     · You do not get better as expected. Where can you learn more?   Go to http://ajc-ejff.info/. Enter V398 in the search box to learn more about \"Lymphedema: Care Instructions. \"  Current as of: March 27, 2018  Content Version: 11.9  © 4820-1007 [employer]. Care instructions adapted under license by [employer] (which disclaims liability or warranty for this information). If you have questions about a medical condition or this instruction, always ask your healthcare professional. [place] [address] any warranty or liability for your use of this information.

## 2019-01-29 NOTE — PROGRESS NOTES
Your urine test showed a mild UTI. Dr. Jaime Traore has sent a prescription for an antibiotic called cipro to your pharmacy.

## 2019-01-29 NOTE — PROGRESS NOTES
CC:   Chief Complaint   Patient presents with    Depression     new PHQ answered    Hypertension    Other     lymphedema       HISTORY OF PRESENT ILLNESS  Allyn Bishop is a 66 y.o. female. Presents for 2 month follow up evaluation. Saw Slade Pierce NP at this clinic on 11/28/18 with lymphedema of both lower legs and LLE cellulitis. She has HTN, depression with anxiety, fibromyalgia, chronic pain, osteoarthritis at lumbar spine, morbid obesity, and numbness at feet and hands. She complains of heaviness sensation at legs. Has been following at [facility] [address]; reports wounds improving, next appointment on 1/31/19. Has compression stockings but has not been wearing them. Wound Care doctor told her he would refer her to Lymphedema Clinic. I referred her to Lymphedema Clinic in 9/18 but she never scheduled an appointment. Had normal RICK study on 1/10/19 and normal bilateral LE venous Doppler on 1/24/19. She complains of burning when she urinates over past few days. Denies  urinary frequency, urgency, or hematuria. Cardiovascular Review  The patient has hypertension. is concerned because her BP has been elevated in [facility]. She reports taking medications as instructed, no medication side effects noted. Diet and Lifestyle: generally follows a low sodium diet, sedentary. Lab review: labs reviewed and discussed with patient.       Depression Review  Patient is seen for follow up of depression. Ongoing depressed mood, fatigue, anhedonia, and problems concentrating.  anxiety; has heart palpitations. Stressors: part-time work, caring for her mother. Treatment includes Prozac 20 mg daily. She denies recurrent thoughts of death and suicidal thoughts without plan. She experiences the following side effects from the treatment: none. Was on Effexor previously but became too expensive.        Chronic Pain Review  Jacek Schmitt RTC today to follow up on chronic pain.   We discussed her osteoarthritis, fibromyalgia, radiculopathy and spondylolisthesis that is affecting her back, bilateral hand, bilateral hips (left > right) and bilateral knees and worsening depression.  Significant changes since last visit: none.  She is  able to do her normal daily activities.  She reports the following adverse side effects: none. Ambulates with a cane.  Sleeps in a recliner. Angel Organ worsens with prolonged standing and prolonged sitting.  Takes Tramadol 50 mg 1 tab once daily for pain.  Lyrica did not help her symptoms.      Least pain over the last week has been 7/10.      Worst pain over the last week has been 10/10.      Opioid Risk Tool Reviewed: YES      Aberrant behaviors: None.        Urine Drug Screen: Last checked 8/14/18, will check again today      Controlled substance agreement on file: YES.          reviewed:yes      Pill count is consistent with her prescription: yes      Concomitant use of a benzodiazepine: no      Active daily MME is 15 mg.      Naloxone prescription not warranted.       Soc Hx  . Has 1 adult daughter in good health. Her 2nd grandchild is due in Nov 2018. Helps her mother who lives in [place] to schedule doctor's appointments. 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 Never smoker. Used to drink alcohol occasionally but stopped all alcohol in 2005. Denies recreational drug use. Does not get exercise due to her chronic pain.     ROS  Constitutional: negative for fevers, chills, night sweats; positive for fatigue  ENT: negative for sore throat, nasal congestion, ear pains, hoarseness  Respiratory: negative for cough, hemoptysis, wheezing; positive for dyspnea with exertion  CV: negative for chest pain, palpitations, lower extremity edema  GI: negative for heartburn, abd pain, nausea, vomiting, diarrhea, constipation  Genitourinary: negative for frequency, dysuria and hematuria  Integument: negative for rash and pruritus  Musculoskel: negative for muscle weakness; positive for myalgias, chronic low back pain  Neurological: negative for dizziness; positive for numbness in feet and hands, gait problems (ambulates with cane), and occ headaches  Behavl/Psych: positive or feelings of anxiety, depression     Patient Active Problem List   Diagnosis Code    HTN (hypertension) I10    Numbness of feet R20.0    Osteopenia M85.80    Fibromyalgia M79.7    Left sided sciatica M54.32    Generalized anxiety disorder F41.1    Lumbar herniated disc M51.26    Primary osteoarthritis of left hip M16.12    Primary osteoarthritis of both knees M17.0    Degenerative lumbar disc M51.36    Morbid obesity with BMI of 50.0-59.9, adult ([facility]) E66.01, Z68.43    High risk medication use Z79.899    Moderate major depression ([facility]) F32.1    Lymphedema I89.0    Chronic venous hypertension (idiopathic) with ulcer and inflammation of bilateral lower extremity (CODE) ([facility]) I87.333    Cellulitis of left lower extremity without foot L03. 116    Cellulitis of right lower extremity without foot L03.115     Past Medical History:   Diagnosis Date    Depression with anxiety     Fibromyalgia     Hypertension     Morbid obesity ([facility]     Osteoarthritis of knee 2016    R knee X-ray 7/5/16 ([facility]) mod to severe tibiofemoral arthritis, mod patellofemoral arthritis    Osteoarthritis of left hip 2015    Osteopenia      No Known Allergies    Current Outpatient Medications   Medication Sig Dispense Refill    FLUoxetine (PROZAC) 20 mg capsule Take 1 Cap by mouth daily. 30 Cap 1    Omega-3 Fatty Acids 60- mg cpDR Take  by mouth.  benazepril (LOTENSIN) 40 mg tablet Take 1 Tab by mouth daily. For blood pressure. 90 Tab 3    traMADol (ULTRAM) 50 mg tablet TAKE 1 TABLET BY MOUTH EVERY 8 HOURS AS NEEDED FOR PAIN 45 Tab 0    naproxen sodium (ALEVE) 220 mg cap Take  by mouth.       ascorbic acid, vitamin C, (VITAMIN C) 250 mg tablet Take 1,000 mg by mouth daily.  cholecalciferol (VITAMIN D3) 1,000 unit cap Take 5,000 Units by mouth daily. Plant based           PHYSICAL EXAM  Visit Vitals  /57 (BP 1 Location: Right arm, BP Patient Position: Sitting)   Pulse 93   Temp 98 °F (36.7 °C) (Oral)   Resp 16   Ht 5' 4\" (1.626 m)   Wt 310 lb (140.6 kg)   SpO2 95%   BMI 53.21 kg/m²       General: Morbidly obese, no distress. HEENT:  Head normocephalic/atraumatic, no scleral icterus  Lungs:  Clear to ausculation bilaterally. Good air movement. Heart:  Tachycardic, regular rhythm, normal S1 and S2, no murmur, gallop, or rub  Extremities: No clubbing, cyanosis. 2+ pitting bilateral LE edema with healed hyperpigmented patches at both lower legs. Neurological: Alert and oriented. Decreased MS at both LE's. Psychiatric: Normal mood and affect. Behavior is normal    Results for orders placed or performed in visit on 01/29/19   AMB POC URINALYSIS DIP STICK AUTO W/O MICRO   Result Value Ref Range    Color (UA POC) Yellow     Clarity (UA POC) Cloudy     Glucose (UA POC) Negative Negative    Bilirubin (UA POC) Negative Negative    Ketones (UA POC) Negative Negative    Specific gravity (UA POC) 1.020 1.001 - 1.035    Blood (UA POC) Negative Negative    pH (UA POC) 5.5 4.6 - 8.0    Protein (UA POC) Negative Negative    Urobilinogen (UA POC) 0.2 mg/dL 0.2 - 1    Nitrites (UA POC) Negative Negative    Leukocyte esterase (UA POC) 1+ Negative         ASSESSMENT AND PLAN    ICD-10-CM ICD-9-CM    1. Essential hypertension I10 401.9    2. Lymphedema of both lower extremities I89.0 457.1    3. Cellulitis of left lower extremity L03.116 682.6    4. Fibromyalgia M79.7 729.1 traMADol (ULTRAM) 50 mg tablet   5. Moderate episode of recurrent major depressive disorder (HCC) F33.1 296.32    6. Generalized anxiety disorder F41.1 300.02 FLUoxetine (PROZAC) 40 mg capsule   7.  Morbid obesity with BMI of 50.0-59.9, adult ([facility] E66.01 278.01 FLUoxetine (PROZAC) 40 mg capsule    Z68.43 V85.43    8. High risk medication use Z79.899 V58.69 10-PANEL URINE DRUG SCREEN   9. Primary osteoarthritis of left hip M16.12 715.15    10. Numbness of feet R20.0 782.0    11. Dysuria R30.0 788.1 AMB POC URINALYSIS DIP STICK AUTO W/O MICRO      CULTURE, URINE     Diagnoses and all orders for this visit:    1. Essential hypertension  Controlled. /57 today. Continue benazepril 40 mg daily. 2. Lymphedema of both lower extremities  Patient states that Wound Care doctor will refer her to Lymphedema Clinic. 3. Cellulitis of left lower extremity  Continue following at [address]. 4. Fibromyalgia  This is a chronic problem that is not changed. Per review of available records and patients , there are not sign of overuse, misuse, diversion, or concerning side effects. Today we reviewed: proper storage and disposal of medications  The following changes were made to the patients current treatment plan: nothing, medications refilled. -     Refill traMADol (ULTRAM) 50 mg tablet; TAKE 1 TABLET BY MOUTH EVERY 8 HOURS AS NEEDED FOR PAIN    5. Moderate episode of recurrent major depressive disorder (HCC)  Increase Prozac dose from 20 to 40 mg daily. Encouraged her to also see a therapist.  Follow up in 3 months. -     Start FLUoxetine (PROZAC) 40 mg capsule; Take 1 Cap by mouth daily. 6. Generalized anxiety disorder  As above. -     Start FLUoxetine (PROZAC) 40 mg capsule; Take 1 Cap by mouth daily. 7. Morbid obesity with BMI of 50.0-59.9, adult ([facility]  Discussed the patient's BMI with her. The BMI follow up plan is as follows: dietary management education, guidance, and counseling; encourage exercise; monitor weight; prescribed dietary intake. Follow up BMI in 3 months    8. High risk medication use  -     10-PANEL URINE DRUG SCREEN    9. Primary osteoarthritis of left hip    10. Numbness of feet    11.  Dysuria  Probable UTI without hematuria based on urine dip. -     AMB POC URINALYSIS DIP STICK AUTO W/O MICRO  -     CULTURE, URINE  -     Start ciprofloxacin HCl (CIPRO) 250 mg tablet; Take 1 Tab by mouth two (2) times a day for 7 days. She wants to postpone all her health maintenance; due for Shingrix vaccine, Pap smear, mammogram, and FIT for colon cancer screening. Greater than 40 mins direct face-to-face time spent with patient. Greater than 50% of time spent on counseling and coordination of care. Follow-up Disposition:  Return in about 3 months (around 4/29/2019), or if symptoms worsen or fail to improve, for HTN, OA, depression, numbness. I have discussed the diagnosis with the patient and the intended plan as seen in the above orders. Patient is in agreement. The patient has received an after-visit summary and questions were answered concerning future plans. I have discussed medication side effects and warnings with the patient as well.

## 2019-01-29 NOTE — PROGRESS NOTES
Ani Adjutant  Identified pt with two pt identifiers(name and ). Chief Complaint   Patient presents with    Depression     new PHQ answered    Hypertension    Other     lymphadema   still has FIT test  1. Have you been to the ER, urgent care clinic since your last visit? Hospitalized since your last visit? NO    2. Have you seen or consulted any other health care providers outside of the [address] [place] [address] since your last visit? Include any pap smears or colon screening. Seen at wound clinic    Today's provider has been notified of reason for visit, vitals and flowsheets obtained on patients.      Patient received paperwork for advance directive during previous visit but has not completed at this time     Reviewed record In preparation for visit, huddled with provider and have obtained necessary documentation      Health Maintenance Due   Topic    Shingrix Vaccine Age 50> (1 of 2)    PAP AKA CERVICAL CYTOLOGY     BREAST CANCER SCRN MAMMOGRAM     FOBT Q 1 YEAR, 18+        Wt Readings from Last 3 Encounters:   19 310 lb (140.6 kg)   18 316 lb 12.8 oz (143.7 kg)   10/27/18 324 lb 4.8 oz (147.1 kg)     Temp Readings from Last 3 Encounters:   19 98 °F (36.7 °C) (Oral)   18 97.7 °F (36.5 °C)   18 98.1 °F (36.7 °C)     BP Readings from Last 3 Encounters:   19 124/57   18 146/85   18 164/76     Pulse Readings from Last 3 Encounters:   19 93   18 98   18 90     Vitals:    19 1410   BP: 124/57   Pulse: 93   Resp: 16   Temp: 98 °F (36.7 °C)   TempSrc: Oral   SpO2: 95%   Weight: 310 lb (140.6 kg)   Height: 5' 4\" (1.626 m)   PainSc:   9   PainLoc: Hip         Learning Assessment:  :     Learning Assessment 2015 3/26/2014   PRIMARY LEARNER Patient Patient   HIGHEST LEVEL OF EDUCATION - PRIMARY LEARNER  GRADUATED HIGH SCHOOL OR GED GRADUATED HIGH SCHOOL OR GED   BARRIERS PRIMARY LEARNER NONE NONE   CO-LEARNER CAREGIVER No No PRIMARY LANGUAGE ENGLISH ENGLISH   LEARNER PREFERENCE PRIMARY LISTENING VIDEOS   ANSWERED BY patient patient   RELATIONSHIP SELF SELF       Depression Screening:  :     PHQ over the last two weeks 1/29/2019   Little interest or pleasure in doing things Nearly every day   Feeling down, depressed, irritable, or hopeless Nearly every day   Total Score PHQ 2 6   Trouble falling or staying asleep, or sleeping too much Nearly every day   Feeling tired or having little energy Nearly every day   Poor appetite, weight loss, or overeating Nearly every day   Feeling bad about yourself - or that you are a failure or have let yourself or your family down Nearly every day   Trouble concentrating on things such as school, work, reading, or watching TV Nearly every day   Moving or speaking so slowly that other people could have noticed; or the opposite being so fidgety that others notice Nearly every day   Thoughts of being better off dead, or hurting yourself in some way Not at all   PHQ 9 Score 24   How difficult have these problems made it for you to do your work, take care of your home and get along with others Very difficult       Fall Risk Assessment:  :     No flowsheet data found. Abuse Screening:  :     No flowsheet data found.     ADL Screening:  :     ADL Assessment 3/27/2018   Feeding yourself No Help Needed   Getting from bed to chair No Help Needed   Getting dressed No Help Needed   Bathing or showering No Help Needed   Walk across the room (includes cane/walker) Help Needed   Using the telphone No Help Needed   Taking your medications No Help Needed   Preparing meals No Help Needed   Managing money (expenses/bills) No Help Needed   Moderately strenuous housework (laundry) No Help Needed   Shopping for personal items (toiletries/medicines) No Help Needed   Shopping for groceries No Help Needed   Driving No Help Needed   Climbing a flight of stairs No Help Needed   Getting to places beyond walking distances Help Needed                 Medication reconciliation up to date and corrected with patient at this time.

## 2019-01-30 LAB
AMPHETAMINES UR QL SCN: NEGATIVE NG/ML
BARBITURATES UR QL SCN: NEGATIVE NG/ML
BENZODIAZ UR QL: NEGATIVE NG/ML
BZE UR QL: NEGATIVE NG/ML
CANNABINOIDS UR QL SCN: NEGATIVE NG/ML
MDMA UR QL SCN: NEGATIVE NG/ML
METHADONE UR QL SCN: NEGATIVE NG/ML
METHAQUALONE UR QL: NEGATIVE NG/ML
OPIATES UR QL: NEGATIVE NG/ML
PCP UR QL: NEGATIVE NG/ML
PROPOXYPH UR QL SCN: NEGATIVE NG/ML

## 2019-01-31 ENCOUNTER — HOSPITAL ENCOUNTER (OUTPATIENT)
Dept: WOUND CARE | Age: 59
Discharge: HOME | End: 2019-01-31
Payer: MEDICAID

## 2019-01-31 VITALS
HEART RATE: 92 BPM | DIASTOLIC BLOOD PRESSURE: 78 MMHG | RESPIRATION RATE: 18 BRPM | SYSTOLIC BLOOD PRESSURE: 135 MMHG | TEMPERATURE: 97.8 F

## 2019-01-31 PROCEDURE — 99213 OFFICE O/P EST LOW 20 MIN: CPT

## 2019-01-31 NOTE — PROGRESS NOTES
I have noted, and reviewed today's data for this patient in [facility], and concur with same. The focused physical exam, past history, review of symptoms and medications remains unchanged today except as noted below. Patient Report: I think its healed. .  Lesion/Wound, focused exam on Presentation today: BLEs ulcer beds now closed and with healthy full thick skin coverage. Note 2+ pitting toes to knee R/L, some redness and thick skin with tenderness in a  Band near the healed ulcers. .      Clinical Considerations: note both venous and lymphedema, long discussion as to venous edema care, elastic support, 20-30 grad knee highs ordered, referral to lymphedema clinic. Counseled regarding/Discussed: as above, alert to and pressure, early signs of breakdown. .  Follow up and future plans: as above, .

## 2019-01-31 NOTE — WOUND CARE
01/31/19 1355   Wound Leg Lower Medial;Right   Date First Assessed/Time First Assessed: 12/06/18 1431   POA: Yes  Wound Type: Blister/bullae;Vascular  Location: Leg Lower  Orientation: Medial;Right   Dressing Status  Other (comment)   Dressing Type  (open to air)   Wound Length (cm) 0 cm   Wound Width (cm) 0 cm   Wound Depth (cm) 0   Wound Surface area (cm^2) 0 cm^2   Change in Wound Size % 100   Drainage Amount  None   Wound Leg Lower Left   Date First Assessed/Time First Assessed: 12/06/18 1436   POA: Yes  Wound Type: Blister/bullae;Vascular  Location: Leg Lower  Orientation: Left   Drainage Amount  None     Visit Vitals  /78 (BP 1 Location: Right arm, BP Patient Position: At rest)   Pulse 92   Temp 97.8 °F (36.6 °C)   Resp 18   No dressing in place, wounds healed. Referral to lymphedema clinic, rx for compression stockings. No need to return to wound center. Discharged ambulatory, 0 pain.

## 2019-03-05 ENCOUNTER — HOSPITAL ENCOUNTER (OUTPATIENT)
Dept: PHYSICAL THERAPY | Age: 59
Discharge: HOME | End: 2019-03-05
Payer: COMMERCIAL

## 2019-03-05 VITALS — BODY MASS INDEX: 50.02 KG/M2 | WEIGHT: 293 LBS | HEIGHT: 64 IN

## 2019-03-05 PROCEDURE — 97140 MANUAL THERAPY 1/> REGIONS: CPT

## 2019-03-05 PROCEDURE — 97162 PT EVAL MOD COMPLEX 30 MIN: CPT
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
## 2019-03-21 ENCOUNTER — HOSPITAL ENCOUNTER (OUTPATIENT)
Dept: PHYSICAL THERAPY | Age: 59
Discharge: HOME | End: 2019-03-21
Payer: COMMERCIAL

## 2019-03-21 PROCEDURE — 97110 THERAPEUTIC EXERCISES: CPT

## 2019-03-21 PROCEDURE — 97140 MANUAL THERAPY 1/> REGIONS: CPT

## 2019-03-21 NOTE — PROGRESS NOTES
[facility]  [facility], [address] [place] [address] 22.    LYmphedema Therapy  Visit: 2    [x]                  Daily note               []                 30 day/10th visit progress note    NAME: Gris Bullock  DATE: 3/21/2019    GOALS  Short term goals  Time frame: 4/3/19  1. Patient will demonstrate knowledge of signs/symptoms of infections/cellulitis and be independent in skin care to prevent cellulitis. Patient educated in daily skin care and the products that she should be using. Educated in cellulitis and infection as well. Will continue to review these topics and monitor her skin as she currently has an open area on the L lower leg. 2.  Patient will demonstrate independence in lymphedema home program of therapeutic exercises to improve circulation and decongest limb to improve ADLs. Patient has many limitations with exercise. Today she was able to demonstrate very basic exercises in the seated position with modifications. Will continue to review. 3.  Patient will be measured and fitted for bilateral lower extremity compression garments in order to prevent worsening of her swelling and decrease her risk for recurrent wounds and blisters. Patient was measured today for bilateral lower leg velcro products: (2)CircAid Juxta Fit Premium Lower Legging Large Full Calf, (2) Farrow Wrap Classic Foot pieces, and extra CircAid Comfort Silver Liners. Will continue to assess if she needs any other items.       Long term goals  Time frame: 5/31/19  1. Pt will show improvement in the [facility] Lymphedema Assessment Scale by decreasing the score to 17 and thus allow improvement in patient's quality of life. 2.  Patient will be independent with don/doff of compression system and use in order to prevent reaccumulation of fluid at discharge.  Patient able to see how garments work and the don and doff of the products that she ordered. Will go over in more detail when her products arrive. 3.  Pt will be independent in self-MLD and show stable limb volumes showing decongestion and pt. ready for transition to independent restorative phase of lymphedema therapy. Began education in Self MLD with Skin care. Will teach patient Self MLD packet on upcoming visit. SUBJECTIVE REPORT: Patient arrived today to the clinic via wheelchair. Patient reports that she has increased difficulty walking and has L hip issues. \"I need a hip replacement, but they will not do it yet! \" Patient reports that she is eager to improve her swelling. She did report that the scab is now off of the L LE and there is an open area present. The area was uncovered when she arrived so increased time today was spent educating her on importance of meticulous skin care and how to use adaptive equipment to assist.    Pain: Patient has increased pain in L hip that she reports today and states that she needs a hip replacement, but not a candidate for surgery at this time per patient. Patient also has a torn meniscus in the knee and bilateral shoulder problems. Gait:   [x]  Independent gait with out assistive device short distances. Today needed wheelchair transport from parking area to clinic. ADLs: Modified independent for upper body, but needs assist to reach her feet from her reacher. Does not shower at this time as she is unable to get into and out of tub and stand. Discussed options for adaptive products for safety. Treatment Response:     [x]   Patient reviewed packet received at evaluation  []   Patient completed home program as prescribed  []   Patient not fully compliant with home program to date  [x]   Patient measured for compression garments today for bilateral lower legs. Function:   [x]   Patient will need assistance with donning/doffing her compression garments and reports that she will have assistance.    []   ADLs are requiring less assistance   []   Patient able to return to work/leisure activities  [facility] Lymphedema Assessment Scale: Deferred  Weight: Deferred today  TREATMENT AND OBJECTIVE DATA SUMMARY:   Patient/Family Education:      Educated in skin care: [x]   Skin care products  [x]   Hygiene  [x]  Prevention of cellulitis  []   Wound care     Educated in exercise: []   Walking program  []   Eryn ball routine  []   Stick routine  [x]   ROM routine (modified)     Instructed in self MLD: Written sequence given and reviewed with patient as well as demonstration and instruction during MLD portion of the session. Instructed in don/doff of compression system:   []   Multi layer bandage (MLB) donning principles and wear precautions  [x]   Day garments ((2)CircAid Juxta Fit Premium Lower Legging Large Full Calf, (2)Farrow Wrap Classic Foot pieces, and extra CircAid Comfort Silver Liners were ordered today for patient. Patient reports that she will have assistance with donning/doffing the products in the home. [x]   Night garments (Discussed these may be an option in the future)     Therapeutic Activity 0 minutes   Treatment time:   Functional Mobility: Patient declined getting onto the mat table today. Fall risk: High      Therapeutic Exercise/Procedure  20 minutes   Treatment time:1:45pm- 2:05pm  Eryn ball exercise program: Deferred   Stick exercise program: Deferred   Free exercises/ROM: Patient has limited motion so exercises were modified. Patient able to demonstrate seated exercises x 5 reps, but with many restrictions and has difficulty L LE > R LE. Will need further review on upcoming visits.    Home program: Patient to perform daily to BID:  [x]   Skin care  [x]   Deep abdominal breathing  [x]   Exercise routine  []   Walking program  [x]   Rest in supine   []   Compression bandage  [x]   Compression garments  [x]   Vasopneumatic device (will set up a trial)  []   Wound care  []   Self MLD  [x]   Bring supplies to each therapy visit  []   Purchase necessary supplies  [x]   Weight loss program  []   Follow up with   Rationale: Exercise will increase the lymph angiomotoricity and tissue pressure of the skin and thus decrease swelling. Modalities 0 minutes   Treatment time:0  Vasopneumatic pump: Will set up a demonstration of a vasopneumatic pump for an upcoming visit. Manual Lymphatic Drainage (MLD) 65 minutes   Treatment time: 12:40pm-1:45pm  Area to decongest:    [] UE          []  Right     []  Left      [] Trunk    [x] LE             [x]  Right     [x]  Left      [x] Trunk     Sequence used and effectiveness: [] Secondary/Primary sequence for upper extremity with / without trunk involvement    [x] Secondary sequence for lower extremities with  trunk involvement  (MLD principles with skin care)    [] Modifications were made to manual lymph drainage sequence to exclude cervical techniques secondary to patient's age    [x] Self MLD sequence/techniques/hand strokes  Patient educated in Self MLD techniques with daily skin care. Will need to go over the entire packet on an upcoming visit. Skin/wound care/debridement: Dermal Status: Plantar right foot presents with thickened hypertrophic skin changes with excess skin build-up, toes are thickened bilaterally  [x]   Intact [x]  Dry   []  Tenuous [x] Flaky   []  Wound/lesion present [x]  Scars- left thigh skin graft scar   []  Dermatitis Scab is present on left anterior lateral distal lower leg, is open today so it was washed and neosporin applied and Mepore bandage applied. Will monitor area and patient encouraged to keep area clean and dry.     Texture/Consistency:  []  Boggy []  Pitting Edema   [x]  Brawny- moderately bilateral lower legs []  Combination   []  Fibrotic/Woody     Pigmentation/Color Change:  []  Normal []  Hemosiderin   []  Red []  Erythematous   [x]  Hyperpigmented []  Hyperlipodermatosclerosis   Anomalies:  []  Lymphorrhea []  Vesicles   []  Petechiae []  Warty Vercusis   []  Bullae []  Papilloma   Circulatory:  [x]  RICK- with normal results 1/2019 []  Varicosities:   [x]  Pulses-pedal pulses palpable bilateral lower extremities [x]  Vascular studies ruled out DVT in past 1/2019, negative for DVT bilaterally   []  DVT History     Nails:  []   Normal  [x]   Fungus     B LEs were cleansed with Dove soap, and Eucrin lotion applied. Patient has open area where scab was located last visit on lower leg leg. This area was treated with neosporin and Mepore bandage applied. Educated in the importance of skin care and also what products would be best for patient to use daily to BID. Patient also was educated on adaptive equipment to make ADLs and bathing easier as she has many limitations. Patient encouraged to purchase these items so that she can be more independent. Upper/Lower extremity compression: Patient not a candidate for bandaging due to availability to get to clinic multiple days a week and she is a fall risk. Patient was shown options in day time compression and chose velcro garments. Patient shown the various styles and brands and she choose (2) CircAid Juxta Fit Premium Lower Legging Large Full Calf, (2)Farrow Wrap Classic Foot pieces, and extra CircAid Comfort Silver Liners. Patient reports that she will have assistance in the home to don/doff garments. Patient will start with these products and see how she is doing and will order other garments as needed in the future. Patient able to leave the clinic in Comperm G applied to B LEs below the knee. She is aware to remove at night. These can be used until her garments arrive as tolerated. Kinesiotaping: Deferred   Girth/Volume measurement: Reviewed results of volumetric measurements taken on evaluation.      TOTAL TREATMENT 100 mins  (unable to charge 15 minutes of time with patient due to the constraints of her insurance plan)      Circumferential Limb Measurements:  Deferred for measuring for garments. ASSESSMENT:   Treatment effectiveness and tolerance: Patient eager to work on improving her swelling. She reports that she now has assistance if needed daily in the home for compression and was ready to proceed with ordering garments. Patient currently unable to wear shoes and only utilizing slippers that are not very supportive. Discussed purchasing some men's tennis shoes that will be able to accommodate her foot swelling and her compression garments when they arrive. Patient will return next week for follow up and is aware to contact the clinic if she has any questions prior to that time. Progress toward goals: See goal section for details. PLAN OF CARE:   Changes to the plan of care: Continue plan of care established at evaluation. Frequency: []  2 times a week  [x]  Weekly  []  Biweekly  []  Monthly   Other: Compression garments will be ordered from [facility] as they are a vendor that works with her insurance plan.         Mervin Resendiz, PTA, CLT

## 2019-03-28 ENCOUNTER — HOSPITAL ENCOUNTER (OUTPATIENT)
Dept: PHYSICAL THERAPY | Age: 59
Discharge: HOME | End: 2019-03-28
Payer: COMMERCIAL

## 2019-03-28 PROCEDURE — 97140 MANUAL THERAPY 1/> REGIONS: CPT

## 2019-03-28 PROCEDURE — 97016 VASOPNEUMATIC DEVICE THERAPY: CPT

## 2019-03-28 NOTE — PROGRESS NOTES
[facility]  Physical Therapy Lymphedema Clinic  [address] [facility], [address] [place] [address].    LYmphedema Therapy  Visit: 3    [x]                  Daily note               []                 30 day/10th visit progress note    NAME: Jeff Adams  DATE: 3/28/2019    GOALS  Short term goals  Time frame: 4/3/19  1. Patient will demonstrate knowledge of signs/symptoms of infections/cellulitis and be independent in skin care to prevent cellulitis. Patient educated in daily skin care and the products that she should be using. Educated in cellulitis and infection as well. Will continue to review these topics and monitor her skin as she currently has an area that is healing on the L lower leg. 2.  Patient will demonstrate independence in lymphedema home program of therapeutic exercises to improve circulation and decongest limb to improve ADLs. Patient has many limitations with exercise. She was able to demonstrate very basic exercises in the seated position with modifications. Will continue to review. 3.  Patient will be measured and fitted for bilateral lower extremity compression garments in order to prevent worsening of her swelling and decrease her risk for recurrent wounds and blisters. Patient was measured last visit for bilateral lower leg velcro products: (2)CircAid Juxta Fit Premium Lower Legging Large Full Calf, (2) Farrow Wrap Classic Foot pieces, and extra CircAid Comfort Silver Liners. The order was sent to [facility] for processing. Will continue to assess if she needs any other items. Today patient was able to have a full treatment/trial on the Flexitouch Plus vaso-pneumatic pump. Patient had some relief from treatment and 7 of the 9 landmarks measured reduced with treatment. Patient is interested in pursuing a pump for home use so information will be sent to [facility] [employer] for processing.      Long term goals  Time frame: 5/31/19  1.   Pt will show improvement in the [facility] Lymphedema Assessment Scale by decreasing the score to 17 and thus allow improvement in patient's quality of life. 2.  Patient will be independent with don/doff of compression system and use in order to prevent reaccumulation of fluid at discharge. Patient able to see how compression garments work and the don and doff of the products that she ordered. Will go over in more detail when her products arrive. 3.  Pt will be independent in self-MLD and show stable limb volumes showing decongestion and pt. ready for transition to independent restorative phase of lymphedema therapy. Continued with Self MLD with skin care. Will teach patient full Self MLD packet on upcoming visit with modifications. Girths taken today for pre and post Flexitouch Plus Vaso-pneumatic pump trial. See measurement section for details. SUBJECTIVE REPORT: Patient arrived today to the clinic via wheelchair. Patient reports that she has been working with her Comperm on both lower legs at home, but was unable to find it today to don prior to coming to her visit today. The area noted on lower LE that was open area last visit is healing, but patient did not have this area covered again today as recommended. Educated her on importance of meticulous skin care and how to use adaptive equipment to assist.    Pain: Patient also has a torn meniscus on the R knee, issues with pain in the  L hip and bilateral shoulder problems. Difficult for patient to rate pain. Gait:   [x]  Independent gait with out assistive device short distances. Today needed wheelchair transport from parking area to clinic. ADLs: Modified independent for upper body, but needs assist to reach her feet from her reacher. Does not shower at this time as she is unable to get into and out of tub and stand. Discussed options for adaptive products for safety again today.    Treatment Response:     [x]   Patient reviewed packet received at evaluation  [x]   Patient completed home program as prescribed with modifications due to her mobility and need for assistance. []   Patient not fully compliant with home program to date  [x]   Patient awaiting compression garments for bilateral lower legs to arrive. Function:   [x]   Patient will need assistance with donning/doffing her compression garments and her Vaso-pneumatic pump and reports that she will have assistance. []   ADLs are requiring less assistance   []   Patient able to return to work/leisure activities  [facility] Lymphedema Assessment Scale: Deferred  Weight: Deferred today  TREATMENT AND OBJECTIVE DATA SUMMARY:   Patient/Family Education:      Educated in skin care: [x]   Skin care products  [x]   Hygiene  [x]  Prevention of cellulitis  [x]   Wound care     Educated in exercise: []   Walking program  []   Eryn ball routine  []   Stick routine  [x]   ROM routine (modified)     Instructed in self MLD: Written sequence given and reviewed with patient as well as demonstration and instruction during MLD portion of the session. Instructed in don/doff of compression system:   []   Multi layer bandage (MLB) donning principles and wear precautions  [x]   Day garments ((2)CircAid Juxta Fit Premium Lower Legging Large Full Calf, (2)Farrow Wrap Classic Foot pieces, and extra CircAid Comfort Silver Liners were ordered last visit for patient. Patient reports that she will have assistance with donning/doffing the products in the home. [x]   Night garments (Discussed these may be an option in the future)    Able to have a pump trial today and patient's information will be submitted to [facility] to work towards obtaining a Flextitouch Plus vasopneumatic device that will treat her trunk and LEs for home use.       Therapeutic Activity 0 minutes   Treatment time:   Functional Mobility: Patient required moderate assist for supine <>sit on the mat table today for pump trial mostly for L LE management. Fall risk: High      Therapeutic Exercise/Procedure  0 minutes   Treatment time:0  Eryn ball exercise program: Deferred   Stick exercise program: Deferred   Free exercises/ROM: Patient has limited motion so exercises were modified. Patient with many restrictions and has difficulty L LE > R LE. Will need further review on upcoming visits. Deferred exercises today for pump trial.   Home program: Patient to perform daily to BID:  [x]   Skin care  [x]   Deep abdominal breathing  [x]   Exercise routine  []   Walking program  [x]   Rest in supine   []   Compression bandage  [x]   Compression garments  [x]   Vasopneumatic device (will submit information for vaso-pneumatic pump for home use)  []   Wound care  [x]   Self MLD with skin care  [x]   Bring supplies to each therapy visit  []   Purchase necessary supplies  [x]   Weight loss program  [x]   Follow up with vendors for her compression when they contact her to confirm her orders. Rationale: Exercise will increase the lymph angiomotoricity and tissue pressure of the skin and thus decrease swelling. Modalities  60 minutes   Treatment time:1:00pm and 2:00pm  Vasopneumatic pump: Patient able to have a full Flexitouch Plus treatment today in the clinic. Positioning patient with head of bed elevated for comfort and pillows under each leg. Patient received the R LE and core treatment with reduction of 7 out of the 9 landmarks today. See measurement section of note for details. Patient would benefit from this device in the home setting to manage her swelling during the restorative phase of care. Information on the treatment/trial sent to [facility] for processing.       Manual Lymphatic Drainage (MLD)  50 minutes   Treatment time: 12:40pm-1:00pm and 2:00pm-2:30pm  Area to decongest:    [] UE          []  Right     []  Left      [] Trunk    [x] LE             [x]  Right     [x]  Left      [x] Trunk     Sequence used and effectiveness: [] Secondary/Primary sequence for upper extremity with / without trunk involvement    [x] Secondary sequence for lower extremities with  trunk involvement (MLD principles with skin care)    [] Modifications were made to manual lymph drainage sequence to exclude cervical techniques secondary to patient's age    [x] Self MLD sequence/techniques/hand strokes  Patient educated in Self MLD techniques with daily skin care. Will need to go over the entire packet on an upcoming visit. Skin/wound care/debridement: Dermal Status: Plantar right foot presents with thickened hypertrophic skin changes with excess skin build-up, toes are thickened bilaterally  [x]   Intact [x]  Dry   []  Tenuous [x] Flaky   []  Wound/lesion present [x]  Scars- left thigh skin graft scar   []  Dermatitis Scab is present on left anterior lateral distal lower leg, is slowly healing. Washed and neosporin applied and Mepore bandage applied. Will monitor area and patient encouraged to keep area clean and dry. Texture/Consistency:  []  Boggy []  Pitting Edema   [x]  Brawny- moderately bilateral lower legs []  Combination   []  Fibrotic/Woody     Pigmentation/Color Change:  []  Normal []  Hemosiderin   []  Red []  Erythematous   [x]  Hyperpigmented []  Hyperlipodermatosclerosis   Anomalies:  []  Lymphorrhea []  Vesicles   []  Petechiae []  Warty Vercusis   []  Bullae []  Papilloma   Circulatory:  [x]  RICK- with normal results 1/2019 []  Varicosities:   [x]  Pulses-pedal pulses palpable bilateral lower extremities [x]  Vascular studies ruled out DVT in past 1/2019, negative for DVT bilaterally   []  DVT History     Nails:  []   Normal  [x]   Fungus    B LEs were cleansed with Dove soap, and Eucrin lotion applied. Patient slow healing area on the lower L LE. This area was treated with neosporin and Mepore bandage applied. Educated in the importance of skin care and also what products would be best for patient to use daily to BID.  Patient also was educated on adaptive equipment to make ADLs and bathing easier as she has many limitations. Patient encouraged to purchase these items so that she can be more independent on previous visits, but has not done so to date. Upper/Lower extremity compression: Patient not a candidate for bandaging due to availability to get to clinic multiple days a week and she is a fall risk. Patient awaiting arrival of her velcro products for B LEs: (2) CircAid Juxta Fit Premium Lower Legging Large Full Calf, (2)Farrow Wrap Classic Foot pieces, and extra CircAid Comfort Silver Liners. Patient reports that she will have assistance in the home to don/doff garments. Patient will start with these products and see how she is doing and will order other garments as needed in the future. Patient able to leave the clinic in Comperm G applied to B LEs below the knee. She is aware to remove at night. These can be used until her garments arrive as tolerated. Patient continues to wear slippers without backs, because she reports not being able to find any shoes to fit her. Discussed importance of finding a good supportive shoe with increased width so that when she receives her products she can have the proper support. Kinesiotaping: Deferred   Girth/Volume measurement: Girths taken today pre and post Flexitouch Plus vaso-pneumatic pump trial. See below for details. TOTAL TREATMENT 110 mins      Circumferential Limb Measurements:  Affected Side: B LEs L LE > R LE                          Left (cm)        Right (cm)    Base of Toes   24.8 24.4  24.1   5th Tuberosity 25.8    25.7  25.9   Ankle 27.8    28.2  26.3   Calf 51    51.6  48.7   Mid Knee 58    58.5  57.5   Thigh 69.4    64.2  64   Groin 74.3   71.1  71.8   Hips  141  140.5     Waist        138.0   136.7        ASSESSMENT:   Treatment effectiveness and tolerance: Patient able to have a vaso-pneumatic pump trial today in the clinic with good results.  Patient is awaiting the arrival of her compression garments that were ordered last visit and utilizing Comperm G until they arrive. Patient reports that she feels that her legs are improving with following her home program, but does need assistance for many aspects of her home program at this time. Expect that once she has all of her products in place she should begin to make good reduction in her swelling. Patient to follow up next week for treatment. Progress toward goals: See goal section for details. PLAN OF CARE:   Changes to the plan of care: Continue plan of care established at evaluation.     Frequency: []  2 times a week  [x]  Weekly  []  Biweekly  []  Monthly   Other: Compression garments ordered from [employer].  Vaso-pneumatic pump ordered from [employer].       Radha Miller Patch, PTA, CLT

## 2019-04-04 ENCOUNTER — HOSPITAL ENCOUNTER (OUTPATIENT)
Dept: PHYSICAL THERAPY | Age: 59
Discharge: HOME | End: 2019-04-04
Payer: COMMERCIAL

## 2019-04-04 PROCEDURE — 97110 THERAPEUTIC EXERCISES: CPT

## 2019-04-04 PROCEDURE — 97140 MANUAL THERAPY 1/> REGIONS: CPT

## 2019-04-04 NOTE — PROGRESS NOTES
[facility]  Physical Therapy Lymphedema Clinic  [address] [facility], [address] [place] [address] 22.    LYmphedema Therapy  Visit: 4    []                  Daily note               [x]                 30 day Reassessment/Progress note    NAME: Kerri Canavan  DATE: 4/4/2019       GOALS  Short term goals  Time frame: 4/3/19  1. Patient will demonstrate knowledge of signs/symptoms of infections/cellulitis and be independent in skin care to prevent cellulitis. Patient educated in daily skin care and the products that she should be using. Educated in cellulitis and infection as well. Will continue to review these topics and monitor her skin as she currently has an area that is healing on the L lower leg. Patient also has areas on bilateral inner thighs with irritation and redness. Patient with open areas there as well. Educated patient on monitoring area, but she can not see it due to her body habitus and mobility issues. Cleansed area and applied barrier cream, Calamize, to the area as she reports that the area burns and is itchy. She scratches the area often and skin is very irritated. Encouraged if this continues or shows signs of infection she needs to go immediately to the MD. Will extend goal.     2.  Patient will demonstrate independence in lymphedema home program of therapeutic exercises to improve circulation and decongest limb to improve ADLs. Patient has many limitations with exercise. She was able to demonstrate very basic exercises in the seated position with modifications, but fatigues quickly and becomes tearful with movement. Will continue to review as tolerated. Will extend goal.     3.  Patient will be measured and fitted for bilateral lower extremity compression garments in order to prevent worsening of her swelling and decrease her risk for recurrent wounds and blisters.  Patient was measured for bilateral lower leg velcro products: (2)CircAid Juxta Fit Premium Lower Legging Large Full Calf, (2) Farrow Wrap Classic Foot pieces, and extra CircAid Comfort Silver Liners. The order was sent to [facility] for processing. They contacted patient, but did not get a response so they reached out to the clinic to let patient know to contact them so they can ship her products. Patient reports that she has spoken with them. Expect that her garments will arrive by next visit. Will continue to assess if she needs any other items. Patient is interested in pursuing a pump for home use so information was sent [facility] for processing. Will extend goal.      Long term goals  Time frame: 5/31/19  1. Pt will show improvement in the [facility] Lymphedema Assessment Scale by decreasing the score to 17 and thus allow improvement in patient's quality of life. 2.  Patient will be independent with don/doff of compression system and use in order to prevent reaccumulation of fluid at discharge. Patient able to see how compression garments work and how to don and doff the products that she ordered. Will educate her in more detail when her products arrive. 3.  Pt will be independent in self-MLD and show stable limb volumes showing decongestion and pt. ready for transition to independent restorative phase of lymphedema therapy. Continued with Self MLD with skin care. Able to demonstrate and have patient go over the full Self MLD packet today with modifications. Full volumes taken today to reveal that patient has gained 78 ml on the L LE and 341 ml on the R LE since evaluation. Patient's reports that she is wearing Comperm regularly to lower legs, but has not arrived to her last 2 appointments with Comperm in place. Patient encouraged to maintain her compression daily. SUBJECTIVE REPORT: Patient arrived today to the clinic via wheelchair and  attendent brought her to the clinic.  Patient became anxious when she called up to the department and could not speak to someone immediately to let them know that she was on the way to her appointment. She was by herself today and needed assistance to get up to the clinic. Patient usually has a significant other with her to assist with wheelchair transport. Patient reports that she has been working with her Comperm on both lower legs at home, but did not wear it in again today. Patient tearful at times during treatment as she reports how hard it is for her to do activities around the house and that she fatigues and becomes short of breath so easy. Encouraged patient to continue to try to mobilize in short sessions and to advise her MD of her concerns when she has follow up. Pain: Patient also has a torn meniscus on the R knee, issues with pain in the  L hip and bilateral shoulder problems. Difficult for patient to rate pain. Patient becomes tearful with basic mobility in the clinic at times. Reports that she had to have an ice pack on her hip when she drove to her appointment today. Gait:   [x]  Independent gait with out assistive device short distances. Today needed wheelchair transport from parking area to clinic and from clinic to parking area. ADLs: Modified independent for upper body, but needs assistance from her reacher to reach her feet. Does not shower at this time as she is unable to get into and out of tub and stand. Discussed options for adaptive products for safety again today and patient has not been able to purchase products to date. Treatment Response:     [x]   Patient reviewed packet received at evaluation  [x]   Patient completed home program partially  as prescribed with modifications due to her mobility and need for assistance. []   Patient not fully compliant with home program to date  [x]   Patient awaiting compression garments for bilateral lower legs to arrive.    Function:   [x]   Patient will need assistance with donning/doffing her compression garments and her Vaso-pneumatic pump and reports that she will have assistance. [x]   ADLs are requiring more assistance at this time. []   Patient able to return to work/leisure activities  [facility] Lymphedema Assessment Scale: Deferred  Weight:  304.0 lbs (down from 306.6 lbs on evaluation)  TREATMENT AND OBJECTIVE DATA SUMMARY:   Patient/Family Education:      Educated in skin care: [x]   Skin care products  [x]   Hygiene  [x]  Prevention of cellulitis  [x]   Wound care     Educated in exercise: []   Walking program  []   Eryn ball routine  []   Stick routine  [x]   ROM routine (modified)     Instructed in self MLD: Written sequence with modifications  given and reviewed with patient as well as demonstration and instruction during MLD portion of the session. Instructed in don/doff of compression system:   []   Multi layer bandage (MLB) donning principles and wear precautions (would benefit from [facility], but does not have the ability to manage MLB and travel for frequent appointments at this time due to her mobility/ Fall risk)  [x]   Day garments ((2)CircAid Juxta Fit Premium Lower Legging Large Full Calf, (2)Farrow Wrap Classic Foot pieces, and extra CircAid Comfort Silver Liners were ordered last visit for patient. Patient reports that she will have assistance with donning/doffing the products in the home. [x]   Night garments (Discussed with her that these may be an option in the future)    Able to have a pump trial last visit  and patient's information will be submitted to [facility] to work towards obtaining a Flextitouch Plus vasopneumatic device that will treat her trunk and LEs for home use. Therapeutic Activity 0 minutes   Treatment time:   Functional Mobility: Patient ambulating with modified independence today with unsteady gait. Sit <> stand from a chair takes increased effort.     Fall risk: High (mulitple medical issues and not wearing proper footwear)     Therapeutic Exercise/Procedure 10 minutes   Treatment time:2:00pm-2:10pm  Eryn ball exercise program: Deferred   Stick exercise program: Deferred   Free exercises/ROM: Patient has limited motion so exercises were modified. Patient with many restrictions and has difficulty L LE > R LE. Today able to demonstrate exercises from below knee, but struggles with hip flexion. Able to perform sit <> stand for small amounts of time for measuring and wound care. Home program: Patient to perform daily to BID:  [x]   Skin care  [x]   Deep abdominal breathing  [x]   Exercise routine  []   Walking program  [x]   Rest in supine   []   Compression bandage  [x]   Compression garments (ordered awaiting arrival)  [x]   Vasopneumatic device (awaiting approval)  []   Wound care  [x]   Self MLD with skin care  [x]   Bring supplies to each therapy visit  []   Purchase necessary supplies  [x]   Weight loss program  [x]   Follow up with vendors for her compression when they contact her to confirm her orders. Rationale: Exercise will increase the lymph angiomotoricity and tissue pressure of the skin and thus decrease swelling. Modalities  0 minutes   Treatment time:0  Vasopneumatic pump: Patient able to have a full Flexitouch Plus treatment last visit in the clinic. Positioned patient with head of bed elevated for comfort and pillows under each leg. Patient received the R LE and core treatment with reduction of 7 out of the 9 landmarks. Patient would benefit from this device in the home setting to manage her swelling during the restorative phase of care. Information on the treatment/trial sent to [facility] for processing to see if patient will be able to get this device for home use.       Manual Lymphatic Drainage (MLD) 90 minutes   Treatment time: 12:30pm-2:00pm   Area to decongest:    [] UE          []  Right     []  Left      [] Trunk    [x] LE             [x]  Right     [x]  Left      [x] Trunk     Sequence used and effectiveness: [] Secondary/Primary sequence for upper extremity with / without trunk involvement    [x] Secondary sequence for lower extremities with  trunk involvement (MLD principles with skin care and also able to educate patient on full B LE/trunk Self MLD packet. Patient struggles with general mobility and was not placed on mat table for MLD today. Patient would benefit from full MLD when tolerable. [x] Modifications were made to manual lymph drainage sequence to exclude cervical techniques secondary to medical history    [x] Self MLD sequence/techniques/hand strokes  Patient educated in Self MLD techniques with daily skin care and today went over the entire Self MLD packet in seated position. Patient unable to perform several of the techniques due to mobility and body habitus limitations. Went over modifications with patient and she understood that it was important to work on the packet to the best of her ability and return next visit with the packet so that further instruction in the packet could be reviewed if needed. Skin/wound care/debridement: Dermal Status: Plantar right foot presents with thickened hypertrophic skin changes with excess skin build-up, toes are thickened bilaterally  [x]   Intact []  Dry   []  Tenuous [x] Flaky   [x]  Wound/lesion present  Bilateral inner thighs with irritation and redness. Patient with open areas in this area. Educated patient on monitoring area, but she can not see it due to her BMI and mobility issues. Cleansed area and applied barrier cream Calamize to the area as she reports that the area burns and is itchy. She scratches the area often and skin is very irritated. Encouraged if this continues or shows signs of infection she needs to go immediately to the MD. Patient hesitate to have MD assess. [x]  Scars- left thigh skin graft scar   []  Dermatitis Scab is present on left anterior lateral distal lower leg and previous wound is closed today. Will monitor area and patient encouraged to keep area clean and dry. Texture/Consistency:  []  Boggy []  Pitting Edema   [x]  Brawny- moderately bilateral lower legs []  Combination   []  Fibrotic/Woody     Pigmentation/Color Change:  []  Normal []  Hemosiderin   []  Red []  Erythematous   [x]  Hyperpigmented []  Hyperlipodermatosclerosis   Anomalies:  []  Lymphorrhea []  Vesicles   []  Petechiae []  Warty Vercusis   []  Bullae []  Papilloma   Circulatory:  [x]  RICK- with normal results 1/2019 []  Varicosities:   [x]  Pulses-pedal pulses palpable bilateral lower extremities [x]  Vascular studies ruled out DVT in past 1/2019, negative for DVT bilaterally   []  DVT History     Nails:  []   Normal  [x]   Fungus    B LEs     R LE      L LE        L LE (healing wound)      Patient had performed her daily skin care today prior to appointment. Skin texture has improved since evaluation. Patient continues with a  slow healing area on the lower L LE closed today. Educated in the importance of skin care and also what products would be best for patient to use daily to BID. Patient also was educated on adaptive equipment to make ADLs and bathing easier as she has many limitations. Patient encouraged to purchase these items so that she can be more independent on previous visits, but has not done so to date. Upper/Lower extremity compression: Patient not a candidate for bandaging due to difficulty getting to clinic multiple days a week and she is a fall risk. Patient awaiting arrival of her velcro products for B LEs: (2) CircAid Juxta Fit Premium Lower Legging Large Full Calf, (2)Farrow Wrap Classic Foot pieces, and extra CircAid Comfort Silver Liners. Vendor contacted clinic earlier in the week stating they had tried to get into contact with the patient, but she had not returned their calls. Patient only has a P.O. Box on file and they need her physical address to ship her garments. Patient reports today that she did call them back so products should arrive before her next visit. Patient reports that she will have assistance in the home to don/doff garments. Patient will start with these products and see how she is doing and will order other garments as needed in the future. Patient able to leave the clinic in Comperm G applied to B LEs below the knee. She is aware to remove at night. These can be used until her garments arrive as tolerated. Patient continues to wear slippers without backs, because she reports not being able to find any shoes to fit her. Discussed importance of finding a good supportive shoe with increased width so that when she receives her products she can have the proper support. Kinesiotaping: Deferred   Girth/Volume measurement: Full volumes taken today to reveal that patient has gained 78 ml on the L LE and 341 ml on the R LE since evaluation. Expect that once patient has her compression garments in place she will make good gains towards goals. TOTAL TREATMENT 100 mins      Circumferential Limb Measurements:  Full volumes taken today see above for details. ASSESSMENT:   Treatment effectiveness and tolerance: Patient able to learn the Self MLD packet today, which requires increased modifications due to patient's mobility limitations. Patient was not able to tolerate increased exercise/mobility due to pain and discomfort today in the L hip and R knee. Patient is hopeful that once she can loose some excess fluid in her legs her mobility will improve. Patient's L LE lower leg wound is slowly healing, but now she has begun to have break down/irritation/small open areas on her inner thighs. Today spend increased time on education in proper skin care and wound care. Patient encouraged to follow up with her MD as she may need a prescription ointment. Discussed also improving nutrition for reduction of weight and swelling and lymphedema nutritional guide given to patient today.  Patient should have her compression garments by next visit to be fitted and assessed. Patient will need to have assistance from her significant other in the home setting to be successful. Patient encouraged to continue to perform her home program as tolerated and wearing Comperm G during the day to B LEs below the knee until her garments arrive. Patient to contact the clinic if she has any questions prior to her next visit. Progress toward goals: See goal section for details. PLAN OF CARE:   Changes to the plan of care: Continue plan of care established at evaluation.     Frequency: []  2 times a week  [x]  Weekly  []  Biweekly  []  Monthly   Other: Compression garments ordered from [employer].  Vaso-pneumatic pump ordered from [employer].       Radha Ricci Patch, PTA, CLT     Moshe Neighbor, MSPT, CLTIRMA

## 2019-04-11 ENCOUNTER — APPOINTMENT (OUTPATIENT)
Dept: PHYSICAL THERAPY | Age: 59
End: 2019-04-11
Payer: COMMERCIAL

## 2019-04-12 ENCOUNTER — HOSPITAL ENCOUNTER (OUTPATIENT)
Dept: PHYSICAL THERAPY | Age: 59
Discharge: HOME | End: 2019-04-12
Payer: COMMERCIAL

## 2019-04-12 PROCEDURE — 97140 MANUAL THERAPY 1/> REGIONS: CPT

## 2019-04-12 PROCEDURE — 97110 THERAPEUTIC EXERCISES: CPT

## 2019-04-12 NOTE — PROGRESS NOTES
[facility]  Physical Therapy [facility]ymphedema [facility]  [address] [place] [address], [address] [place] [address], [facility].    LYmphedema Therapy  Visit: 5    [x]                  Daily note               []                 30 day Reassessment/Progress note    NAME: Nancy Wharton  DATE: 4/12/2019       GOALS  Short term goals  Time frame: 4/3/19 (All goals extended to 5/31/19)  1. Patient will demonstrate knowledge of signs/symptoms of infections/cellulitis and be independent in skin care to prevent cellulitis. Patient educated in daily skin care and the products that she should be using. Educated in cellulitis and infection as well. Will continue to review these topics and monitor her skin as she currently has an area that is healing on the L lower leg. Patient also has areas on bilateral inner thighs with irritation has improved. Patient reports she has purchased Calazime and feels that it has helped. Educated patient on monitoring area, but she can not see it due to her body habitus and mobility issues. Encouraged if this continues or shows signs of infection she needs to go immediately to the MD.    2.  Patient will demonstrate independence in lymphedema home program of therapeutic exercises to improve circulation and decongest limb to improve ADLs. Patient has many limitations with exercise. She was able to demonstrate very basic exercises in the seated position with modifications, but fatigues quickly and becomes tearful with movement. Will continue to review as tolerated. Focus on exercises that assist her with her ADL's and basic mobility. 3.  Patient will be measured and fitted for bilateral lower extremity compression garments in order to prevent worsening of her swelling and decrease her risk for recurrent wounds and blisters.  Patient received the following products and brought them in for fitting and education today: (2)CircAid Juxta Fit Premium Lower Legging Large Full Calf, (2) Farrow Wrap Classic Foot pieces, and extra CircAid Comfort Silver Liners. Patient able to demonstrate that she could don/doff the R LE garment her self with increased time in the clinic. She is unable to don foot pieces independently. Patient will have assist in the home, but caregiver was not able to come to her treatment visit today for education. After increased practice with garments and education from therapist, patient felt that she could manage them in the home with assistance. Garments also came with written instructions and pictures of step by step donning for her caregiver to follow. Will continue to assess if she needs any other items, once the effectiveness of these garments are assessed in a few weeks. Patient is interested in pursuing a pump for home use [facility]       Long term goals  Time frame: 5/31/19  1. Pt will show improvement in the [address] Lymphedema Assessment Scale by decreasing the score to 17 and thus allow improvement in patient's quality of life. Deferred   2. Patient will be independent with don/doff of compression system and use in order to prevent reaccumulation of fluid at discharge. Patient educated on the wear and care of her new products. Patient able to don the R LE velcro garment, but reports due to hip discomfort that she would need assist with L LE garment and the foot pieces. Handouts provided with garments for her caregiver to follow. Patient aware to contact the clinic if she has any concerns. Patient again encouraged to secure appropriate foot wear for use in the home as this will be needed to maintain the garments and foot pieces. Patient currently only wears backless slippers, which place her at risk for falls and are not supportive.     3.  Pt will be independent in self-MLD and show stable limb volumes showing decongestion and pt. ready for transition to independent restorative phase of lymphedema therapy. Continued with Self MLD with skin care. Able to demonstrate and have patient go over the full Self MLD packet last visit with modifications. Will assess full volumes when she returns in 3 weeks for follow up. SUBJECTIVE REPORT: Patient arrived today to the clinic via wheelchair and did not have caregiver available so staff assisted her. Patient received her products and brought them all in for fitting and education. Patient reports because of her work schedule that she will only be able to come to the clinics on Friday afternoons. The next available appointment would be May 3rd. Patient aware to contact the clinic if she was having issues prior to that date and if there was a cancellation we would make her aware. Pain: Patient also has a torn meniscus on the R knee, issues with pain in the L hip and bilateral shoulder problems. Difficult for patient to rate pain. Patient struggles with basic mobility in the clinic at times. Gait:   [x]  Independent gait with out assistive device short distances. Today needed wheelchair transport from parking area to clinic and from clinic to parking area. ADLs: Modified independent for upper body, but needs assistance from her reacher to reach her feet. Does not shower at this time as she is unable to get into and out of tub and stand. Discussed options for adaptive products for safety again today and patient has not been able to purchase products to date. Patient now with all of her day time compression garments and encouraged her to purchase shoes that will accommodate her foot pieces. Treatment Response:     [x]   Patient reviewed packet received at evaluation  [x]   Patient completed home program partially  as prescribed with modifications due to her mobility and need for assistance. []   Patient not fully compliant with home program to date  [x]   Patient awaiting compression garments for bilateral lower legs to arrive.    Function:   [x] Patient will need some assistance with donning/doffing her compression garments and her Vaso-pneumatic pump and reports that she will have assistance. [x]   ADLs are requiring more assistance at this time. []   Patient able to return to work/leisure activities  [facility] Lymphedema Assessment Scale: Deferred  Weight:  304.8 lbs   TREATMENT AND OBJECTIVE DATA SUMMARY:   Patient/Family Education:      Educated in skin care: [x]   Skin care products  [x]   Hygiene  [x]  Prevention of cellulitis  [x]   Wound care     Educated in exercise: []   Walking program  []   Eryn ball routine  []   Stick routine  [x]   ROM routine (modified)     Instructed in self MLD: Written sequence with modifications  given and reviewed with patient as well as demonstration and instruction during MLD portion of the session. Instructed in don/doff of compression system:   []   Multi layer bandage (MLB) donning principles and wear precautions (would benefit from [facility], but does not have the ability to manage MLB and travel for frequent appointments at this time due to her mobility/ Fall risk)  [x]   Day garments ((2)CircAid Juxta Fit Premium Lower Legging Large Full Calf, (2)Farrow Wrap Classic Foot pieces, and extra CircAid Comfort Silver Liners were fitted today in the clinic. Patient also received addition Farrow liner socks with her foot pieces so she has enough liner socks for ease in laundering. Patient reports that she will have assistance with donning/doffing the products in the home. Able to educate patient today on don/doff, wear and care of garments. She is able to don R LE velcro product herself. Will need assist for L LE and foot pieces until she has worked with garments to be more independent.    [x]   Night garments (Discussed with her that these may be an option in the future)    Able to have a pump trial last visit  and patient's information will be submitted to [facility] to work towards obtaining a Flextitouch Plus vasopneumatic device that will treat her trunk and LEs for home use. Therapeutic Activity 0 minutes   Treatment time:   Functional Mobility: Patient ambulating with modified independence today with unsteady gait. Sit <> stand from a chair takes increased effort. Fall risk: High (mulitple medical issues and not wearing proper footwear)     Therapeutic Exercise/Procedure 25 minutes   Treatment time:1:35pm-2:00pm  Eryn ball exercise program: Deferred   Stick exercise program: Deferred   Free exercises/ROM: Patient has limited motion so exercises were modified. Patient with many restrictions and has difficulty L LE > R LE. Patient able to demonstrate that she can perform seated basic exercises with modifications today. Patient also able to demonstrate range of motion with don/doffing of her new compression garments. Patient also educated on the importance of deep abdominal breathing throughout the day. Home program: Patient to perform daily to BID:  [x]   Skin care  [x]   Deep abdominal breathing  [x]   Exercise routine  []   Walking program  [x]   Rest in supine   []   Compression bandage  [x]   Compression garments (garments fitted today)  [x]   Vasopneumatic device (information submitted)  []   Wound care  [x]   Self MLD with skin care  [x]   Bring supplies to each therapy visit  [x]   Purchase necessary supplies-shoes to accommodate velcro products. [x]   Weight loss program  []   Follow up   Rationale: Exercise will increase the lymph angiomotoricity and tissue pressure of the skin and thus decrease swelling. Modalities  0 minutes   Treatment time:0  Vasopneumatic pump: Information sent to [facility] for processing to see if patient can obtain a Flexitouch Plus Vaso-pneumatic pump for home use.        Manual Lymphatic Drainage (MLD) 65 minutes   Treatment time: 12:30pm-1:35pm  Area to decongest:    [] UE          []  Right     []  Left      [] Trunk    [x] LE             [x] Right     [x]  Left      [x] Trunk     Sequence used and effectiveness: [] Secondary/Primary sequence for upper extremity with / without trunk involvement    [x] Secondary sequence for lower extremities with  trunk involvement (MLD principles with skin care and also able to educate patient on full B LE/trunk Self MLD packet. Patient struggles with general mobility and was not placed on mat table for MLD today. Patient would benefit from full MLD when tolerable. [x] Modifications were made to manual lymph drainage sequence to exclude cervical techniques secondary to medical history    [x] Self MLD sequence/techniques/hand strokes  Patient educated in Self MLD techniques with daily skin care and patient has Self MLD packet to follow in seated position. Patient unable to perform several of the techniques due to mobility and body habitus limitations. Went over modifications with patient and she understood that it was important to work on the packet to the best of her ability and  that further instruction in the packet could be reviewed if needed. Skin/wound care/debridement: Dermal Status: Plantar right foot presents with thickened hypertrophic skin changes with excess skin build-up, toes are thickened bilaterally  [x]   Intact []  Dry   []  Tenuous [x] Flaky   [x]  Wound/lesion present  Bilateral inner thighs with irritation and redness improved as patient purchased recommended barrier cream and applying to the area. Educated patient on monitoring area, but she can not see it due to her BMI and mobility issues. Encouraged if issues persist or shows signs of infection she needs to go immediately to the MD. Patient hesitate to have MD assess. [x]  Scars- left thigh skin graft scar   []  Dermatitis Scab is present on left anterior lateral distal lower leg and previous wound is closed today. Will monitor area and patient encouraged to keep area clean and dry.     Texture/Consistency:  []  Boggy []  Pitting Edema   [x]  Brawny- moderately bilateral lower legs []  Combination   []  Fibrotic/Woody     Pigmentation/Color Change:  []  Normal []  Hemosiderin   []  Red []  Erythematous   [x]  Hyperpigmented []  Hyperlipodermatosclerosis   Anomalies:  []  Lymphorrhea []  Vesicles   []  Petechiae []  Warty Vercusis   []  Bullae []  Papilloma   Circulatory:  [x]  RICK- with normal results 1/2019 []  Varicosities:   [x]  Pulses-pedal pulses palpable bilateral lower extremities [x]  Vascular studies ruled out DVT in past 1/2019, negative for DVT bilaterally   []  DVT History     Nails:  []   Normal  [x]   Fungus    Skin texture has improved since evaluation. Patient continues with a slow healing area on the lower L LE closed today. Patient also with irritation between upper thighs that has improved with use of barrier cream.  Educated in the importance of skin care and also what products would be best for patient to use daily to BID. Patient also was educated on adaptive equipment to make ADLs and bathing easier as she has many limitations. Patient encouraged to purchase these items so that she can be more independent. Upper/Lower extremity compression: Patient not a candidate for bandaging due to difficulty getting to clinic multiple days a week and she is a fall risk. Patient received her velcro products for B LEs: (2) CircAid Juxta Fit Premium Lower Legging Large Full Calf, (2)Farrow Wrap Classic Foot pieces, and extra CircAid Comfort Silver Liners. All products and accessories were gone over with patient today during fitting of the products. Don/doff, wear/care schedule gone over at length. Patient able to demonstrate good understanding after going over the instructions several times that she felt confident that she would be able to assist with explaining to her caregiver how to assist her. She was able to don the R LE velcro product independently.  Expect with increased time practicing in the home she will be able to don the products on her own with increased time and adaptive equipment if needed. Discussed signs/ symptoms and precautions with compression use at length. Patient educated on the gauge card for the Juxta fit products and that at this time all straps should be set at 20 mmHg. Patient understood also that if she has any questions to contact the clinic. Patient reports that she is only able to come to the clinic on Friday afternoons,  because of her work schedule. Next available appointment that patient can attend with her schedule will be in three weeks. Patient should do well with velcro products, but if she has concerns she is to call and try to arrange to come in sooner. Patient reports that she will have assistance in the home to don/doff garments and written/picture instructions come with her garments. Patient will start with these products and see how she is doing and will order other garments as needed in the future. Patient able to leave the clinic in B lower extremity Juxtafit velcro products set at 20 mmHg, with closed toe sliver liners underneath. She was unable to wear the Farrow foot pieces comfortably with her slippers so she will don them when she returns home. Patient aware of the importance of consistent compression use to see results. She is aware to remove her products  at night. .   Patient continues to wear slippers without backs, because she reports not being able to find any shoes to fit her foot. Discussed importance of finding a good supportive shoe with increased width now that she has her products she can have the proper support as requested. Kinesiotaping: Deferred   Girth/Volume measurement: Deferred. Will reassess when patient returns for follow up when she has worn her products for several weeks.        TOTAL TREATMENT 90 mins      Circumferential Limb Measurements:  Deferred       ASSESSMENT:   Treatment effectiveness and tolerance: Patient received her day time compression products today and all were fitted and felt comfortable to patient. Patient to work with her garments and return in 3 weeks or sooner if issues with garments. Patient reports that her work schedule will only allow her to get to the clinic for follow ups on Friday afternoons. Patient aware to contact the clinic if she has questions about her new products prior to that time. Patient to focus on consistent daily use of garments and purchasing appropriate foot wear prior to her next visit. Progress toward goals: See goal section for details. PLAN OF CARE:   Changes to the plan of care: Continue plan of care established at evaluation. Frequency: []  2 times a week  []  Weekly  [x]  Biweekly  [x]  Monthly   Other: Day time compression garments ordered from [address] arrived and were fitted today.    Vaso-pneumatic pump ordered from [facility] awaiting approval       Mervin Resendiz, PTA, CLT

## 2019-05-03 ENCOUNTER — HOSPITAL ENCOUNTER (OUTPATIENT)
Dept: PHYSICAL THERAPY | Age: 59
Discharge: HOME | End: 2019-05-03
Payer: COMMERCIAL

## 2019-05-03 VITALS — HEIGHT: 64 IN | WEIGHT: 293 LBS | BODY MASS INDEX: 50.02 KG/M2

## 2019-05-03 PROCEDURE — 97140 MANUAL THERAPY 1/> REGIONS: CPT

## 2019-05-03 PROCEDURE — 97110 THERAPEUTIC EXERCISES: CPT

## 2019-05-03 NOTE — PROGRESS NOTES
[facility]  Physical Therapy Lymphedema Clinic  [address], [address], [place]  [place].    LYmphedema Therapy  Visit: 6    []                  Daily note               [x]                 Discharge Summary/Progress note    NAME: Dominique Cadena  DATE: 5/3/2019       GOALS  Short term goals  Time frame: 4/3/19 (All goals extended to 5/31/19)  1. Patient will demonstrate knowledge of signs/symptoms of infections/cellulitis and be independent in skin care to prevent cellulitis. Patient educated in daily skin care and the products that she should be using. Educated in cellulitis and infection as well. Patient also has areas on bilateral inner thighs with irritation has improved. Patient reports she has purchased Calazime and feels that it has helped. Educated patient on monitoring area, but she can not see it due to her body habitus and mobility issues. Encouraged if  She shows signs of infection she needs to go immediately to the MD. Goal Met 5/3/19    2. Patient will demonstrate independence in lymphedema home program of therapeutic exercises to improve circulation and decongest limb to improve ADLs. Patient has many limitations with exercise. She was able to demonstrate independence with very basic exercises in the seated position with modifications, but fatigues quickly and becomes tearful with movement. Patient encouraged to focus on exercises that assist her with her ADL's and basic mobility. Goal Met 5/3/19    3. Patient will be measured and fitted for bilateral lower extremity compression garments in order to prevent worsening of her swelling and decrease her risk for recurrent wounds and blisters. Patient received the following products: (2)CircAid Juxta Fit Premium Lower Legging Large Full Calf, (2) Farrow Wrap Classic Foot pieces, and extra CircAid Comfort Silver Liners.  Patient able to demonstrate that she could don/doff the R LE garment her self with increased time in the clinic. She is unable to don foot pieces independently. Patient will have assist in the home, but caregiver was not able to come to her treatment visit today for education. After increased practice with garments and education from therapist, patient felt that she could manage them in the home with assistance. Garments also came with written instructions and pictures of step by step donning for her caregiver to follow. Patient able to wear her in her [address] lower leg garments, but did not wear in her foot pieces as she does not have appropriate shoes to wear with the products. Patient is interested in pursuing a pump for home use so information was sent [facility] for processing, she should be receiving her pump and then having in home training on the device soon. Goal Met as patient has products/or will be receiving products that can assist her with swelling and preventing reoccurring skin wounds and blisters if she uses them consistently in the home setting. Goal Met 5/3/19        Long term goals  Time frame: 5/31/19  1. Pt will show improvement in the [facility] Lymphedema Assessment Scale by decreasing the score to 17 and thus allow improvement in patient's quality of life. Patient scored a 19/20 on the [place] Lymphedema Scale today. Goal Unmet at discharge. 2.  Patient will be independent with don/doff of compression system and use in order to prevent reaccumulation of fluid at discharge. Patient educated on the wear and care of her new products. Patient able to don the R LE velcro garment, but reports due to hip discomfort that she would need assist with L LE garment and the foot pieces. Handouts provided with garments for her caregiver to follow. Patient again encouraged to secure appropriate foot wear for use in the home as this will be needed to maintain the garments and foot pieces.  Patient currently only wears backless slippers, which place her at risk for falls and are not supportive. Patient able to demonstrate that she can don/doff or have someone dondoff her garments now she needs to work on consistency. Goal Met 5/3/19  3. Pt will be independent in self-MLD and show stable limb volumes showing decongestion and pt. ready for transition to independent restorative phase of lymphedema therapy. Continued with Self MLD with skin care. Able to demonstrate and have patient go over the full Self MLD packet last visit with modifications. Full volumes taken today to reveal that patient lost 162 ml on the L LE and 122 ml on the R LE since volumes were last taken on 4/4/19. Since evaluation patient has lost 78 ml on the L LE and gained 219 ml on the R LE. Today patient's percent difference was 5% compared to 7% at evaluation on 3/6/19. L LE 14,774. 59 ml today compared to 14,858.98 ml on evaluation 3/6/19  R LE 14,053.79 ml today compared to 13,835.24 ml on evaluation 3/6/19  Patient's volumes stable today. Will reassess in 6 months after she has been able to work with her products, perform her home program and begin use of vaso-pneumatic pump. Goal Met 5/3/19          SUBJECTIVE REPORT: Patient arrived today reporting she did not know how many more times she would be able to come in for her sessions. Her work schedule, limited mobility and distance to drive to the clinic makes it more challenging for her to come to the clinic. Patient did wear in her Juxta fit lower leg garments today and had a reduction in her volumes noted. Pain: Patient also has a torn meniscus on the R knee, issues with pain in the L hip and bilateral shoulder problems. Difficult for patient to rate pain. .  Today she feels that she has pulled a muscle on her R side of her trunk and is having more L hip/sciatic pain. Gait:   [x]  Independent gait with out assistive device short distances. Today needed wheelchair transport from parking area to clinic and from clinic to parking area. ADLs: Modified independent for upper body, but needs assistance from her reacher to reach her feet. Does not shower at this time as she is unable to get into and out of tub and stand. Discussed options for adaptive products for safety again today and patient has not been able to purchase products to date. Patient now with all of her day time compression garments and encouraged her to purchase shoes that will accommodate her foot pieces, but she has not done this since her last visit three weeks ago. Treatment Response:     [x]   Patient reviewed packet received at evaluation  [x]   Patient completed home program partially as prescribed with modifications due to her mobility and need for assistance. []   Patient not fully compliant with home program to date  [x]   Patient awaiting compression garments for bilateral lower legs to arrive. Function:   [x]   Patient will need some assistance with donning/doffing her compression garments and her vaso-pneumatic pump and reports that she will have assistance. [x]   ADLs are requiring more assistance at this time. []   Patient able to return to work/leisure activities  [facility] Lymphedema Assessment Scale: 19/20   Weight:  304.8 lbs   TREATMENT AND OBJECTIVE DATA SUMMARY:   Patient/Family Education:      Educated in skin care: [x]   Skin care products  [x]   Hygiene  [x]  Prevention of cellulitis  [x]   Wound care     Educated in exercise: []   Walking program  []   Eryn ball routine  []   Stick routine  [x]   ROM routine (modified)     Instructed in self MLD: Written sequence with modifications given and reviewed with patient as well as demonstration and instruction during MLD portion of the session.      Instructed in don/doff of compression system:   []   Multi layer bandage (MLB) donning principles and wear precautions (would benefit from [facility], but does not have the ability to manage MLB and travel for frequent appointments at this time due to her mobility/ Fall risk)  [x]   Day garments ((2)CircAid Juxta Fit Premium Lower Legging Large Full Calf, (2)Farrow Wrap Classic Foot pieces, and extra CircAid Comfort Silver Liners were  in the clinic. Patient also received addition Farrow liner socks with her foot pieces so she has enough liner socks for ease in laundering. Patient reports that she will have assistance with donning/doffing the products in the home. Able to educate patient on don/doff, wear and care of garments. She is able to don R LE velcro product herself. Will need assist for L LE and foot pieces until she has worked with garments to be more independent. [x]   Night garments (Discussed with her that these may be an option in the future. Patient's current garments can be worn into the evening as well as needed if she performs skin checks)    Able to have a pump trial and patient's information will be submitted to [facility] to work towards obtaining a Flextitouch Plus vasopneumatic device that will treat her trunk and LEs for home use during the restorative phase of care. She should be receiving the device soon and have in home training. Therapeutic Activity 0 minutes   Treatment time:   Functional Mobility: Patient ambulating with modified independence today with unsteady gait. Sit <> stand from a chair takes increased effort. Fall risk: High (mulitple medical issues and not wearing proper footwear)     Therapeutic Exercise/Procedure 30 minutes   Treatment time:3:30pm -4:00pm  Eryn ball exercise program: Deferred   Stick exercise program: Deferred   Free exercises/ROM: Patient has limited motion so exercises were modified. Patient with many restrictions and has difficulty L LE > R LE. Patient able to demonstrate that she can perform seated basic exercises with modifications today. Patient also able to demonstrate range of motion with don/doffing of her new compression garments.    Patient also educated on the importance of deep abdominal breathing throughout the day. Discussed changing of positions and gentle range of motion before transitions from sit<>stand and supine <>sit. Home program: Patient to perform daily to BID:  [x]   Skin care  [x]   Deep abdominal breathing  [x]   Exercise routine  []   Walking program  [x]   Rest in supine   []   Compression bandage  [x]   Compression garments   [x]   Vasopneumatic device (information submitted/she should be receiving her device soon)  []   Wound care  [x]   Self MLD with skin care  [x]   Bring supplies to each therapy visit  [x]   Purchase necessary supplies-shoes to accommodate velcro products. [x]   Weight loss program  []   Follow up   Rationale: Exercise will increase the lymph angiomotoricity and tissue pressure of the skin and thus decrease swelling. Modalities  0 minutes   Treatment time:0  Vasopneumatic pump: Information sent to [facility] for processing to see if patient can obtain a Flexitouch Plus Vaso-pneumatic pump for home use. Patient should be receiving her device soon. Manual Lymphatic Drainage (MLD) 65 minutes   Treatment time: 2:25pm-3:30pm  Area to decongest:    [] UE          []  Right     []  Left      [] Trunk    [x] LE             [x]  Right     [x]  Left      [x] Trunk     Sequence used and effectiveness: [] Secondary/Primary sequence for upper extremity with / without trunk involvement    [x] Secondary sequence for lower extremities with  trunk involvement (MLD principles with skin care and also able to educate patient on full B LE/trunk Self MLD packet. Patient struggles with general mobility and was not placed on mat table for MLD today. Patient would benefit from full MLD when tolerable, currently struggling with issues with pain and reduced mobility.      [x] Modifications were made to manual lymph drainage sequence to exclude cervical techniques secondary to medical history    [x] Self MLD sequence/techniques/hand strokes  Patient educated in Self MLD techniques with daily skin care and patient has Self MLD packet to follow in seated position. Patient unable to perform several of the techniques due to mobility and body habitus limitations. Went over modifications with patient and she understood that it was important to work on the packet to the best of her ability and  that further instruction in the packet could be reviewed if needed. Skin/wound care/debridement: Dermal Status: Plantar right foot presents with thickened hypertrophic skin changes with excess skin build-up, toes are thickened bilaterally  [x]   Intact []  Dry   []  Tenuous [x] Flaky   [x]  Wound/lesion present  Bilateral inner thighs with irritation and redness improved as patient purchased recommended barrier cream and applying to the area. Educated patient on monitoring area, but she can not see it due to her BMI and mobility issues. Encouraged if issues persist or shows signs of infection she needs to go immediately to the MD. Patient hesitate to have MD assess. [x]  Scars- left thigh skin graft scar   []  Dermatitis Scab is present on left anterior lateral distal lower leg and previous wound is closed today. Will monitor area and patient encouraged to keep area clean and dry. Texture/Consistency:  []  Boggy []  Pitting Edema   [x]  Brawny- moderately bilateral lower legs []  Combination   []  Fibrotic/Woody     Pigmentation/Color Change:  []  Normal []  Hemosiderin   []  Red []  Erythematous   [x]  Hyperpigmented []  Hyperlipodermatosclerosis   Anomalies:  []  Lymphorrhea []  Vesicles   []  Petechiae []  Warty Vercusis   []  Bullae []  Papilloma   Circulatory:  [x]  RICK- with normal results 1/2019 []  Varicosities:   [x]  Pulses-pedal pulses palpable bilateral lower extremities [x]  Vascular studies ruled out DVT in past 1/2019, negative for DVT bilaterally   []  DVT History     Nails:  []   Normal  [x]   Fungus    Skin texture has improved since evaluation.   Patient continues with a slow healing area on the lower L LE,  closed today. Patient also with irritation between upper thighs that has improved with use of barrier cream.  Educated in the importance of skin care and also what products would be best for patient to use daily to BID. Patient also was educated on adaptive equipment to make ADLs and bathing easier as she has many limitations. Patient encouraged to purchase adaptive equipment items so that she can be more independent. Upper/Lower extremity compression: Patient not a candidate for bandaging due to difficulty getting to clinic multiple days a week and she is a fall risk. Patient received her velcro products for B LEs: (2) CircAid Juxta Fit Premium Lower Legging Large Full Calf, (2)Farrow Wrap Classic Foot pieces, and extra CircAid Comfort Silver Liners. All products and accessories were gone over with patient last visit during fitting of the products and again today. Don/doff, wear/care schedule gone over at length. Patient able to demonstrate good understanding after going over the instructions several times that she felt confident that she would be able to assist with explaining to her caregiver how to assist her. She was able to don the R LE velcro product independently. Expect with increased time practicing in the home she will be able to don the products on her own with increased time and adaptive equipment if needed. Discussed signs/ symptoms and precautions with compression use at length. Patient educated on the gauge card for the Juxta fit products and that at this time all straps should be set at 20 mmHg and once tolerated could move to 30 mmHg. Patient understood also that if she has any questions to contact the clinic. Patient reports that she will have assistance in the home to don/doff garments and written/picture instructions come with her garments.    Patient will start with these products and see how she is doing and will order other garments as needed in the future. These products could potentially also be used in the evening as long as she performs skin checks. Patient able to leave the clinic in B lower extremity Juxtafit velcro products set at 20 mmHg, with closed toe sliver liners underneath. She was unable to wear the Farrow foot pieces comfortably with her slippers so she will don them when she returns home. Patient continues to wear slippers without backs, because she reports not being able to find any shoes to fit her foot. Discussed importance of finding a good supportive shoe with increased width now that she has her products she can have the proper support as requested. Patient had 3 weeks to try to obtain a supportive shoe, but did not do so. Patient needs to find a shoe that will accommodate her foot pieces for safe ambulation. Kinesiotaping: Deferred   Girth/Volume measurement: Full volumes taken today to reveal that patient lost 162 ml on the L LE and 122 ml on the R LE since volumes were last taken on 4/4/19. Since evaluation patient has lost 78 ml on the L LE and gained 219 ml on the R LE. Today patient's percent difference was 5% compared to 7% at evaluation on 3/6/19. L LE 14,774. 59 ml today compared to 14,858.98 ml on evaluation 3/6/19  R LE 14,053.79 ml today compared to 13,835.24 ml on evaluation 3/6/19     TOTAL TREATMENT 95 mins      Circumferential Limb Measurements:  Full volumes taken today see above and scanned documents for details. ASSESSMENT:   Treatment effectiveness and tolerance: Patient has made improvement with use of her new compression garments, but has not been able to follow through in the last 3 weeks with getting appropriate foot wear so that she can utilize her foot pieces. Patient has many barriers to fully being able to perform CDT as recommended.  Patient is only able to get to appointments on Friday, the drive is difficult for her to get to the clinic, and her support system is not consistent with assisting her. Patient has many other health and mobility issues that she is currently dealing with at this time. Patient has potential to make progress with consistent use of her products and following her recommended home program. Patient needs to be consistent with use of her compression garments and vaso-pneumatic pump when she receives it and return to the clinic in 6 months for follow up to see how she is managing. Patient aware to contact the clinic if she has questions or concerns prior to returning for her 6 month follow up visit. Progress toward goals: See goal section for details. All goals met with exception of LTG #1. Will reassess this goal when she returns in 6 months. PLAN OF CARE:   Changes to the plan of care: Continue plan of care established at evaluation. Frequency: []  2 times a week  []  Weekly  []  Biweekly  []  Monthly  [x]  6 Month Follow Up     [employer] with patient on obtaining a vaso-pneumatic pump for home use.         Mervin Resendiz, PTA, CLT     Mony Davis, MAVERICKT, CLTIRMA

## 2019-05-10 ENCOUNTER — OFFICE VISIT (OUTPATIENT)
Dept: INTERNAL MEDICINE CLINIC | Facility: CLINIC | Age: 59
End: 2019-05-10

## 2019-05-10 VITALS
OXYGEN SATURATION: 97 % | DIASTOLIC BLOOD PRESSURE: 73 MMHG | WEIGHT: 293 LBS | RESPIRATION RATE: 18 BRPM | TEMPERATURE: 98.9 F | SYSTOLIC BLOOD PRESSURE: 111 MMHG | BODY MASS INDEX: 50.02 KG/M2 | HEIGHT: 64 IN | HEART RATE: 83 BPM

## 2019-05-10 DIAGNOSIS — F41.1 GENERALIZED ANXIETY DISORDER: ICD-10-CM

## 2019-05-10 DIAGNOSIS — M16.12 PRIMARY OSTEOARTHRITIS OF LEFT HIP: ICD-10-CM

## 2019-05-10 DIAGNOSIS — M79.7 FIBROMYALGIA: ICD-10-CM

## 2019-05-10 DIAGNOSIS — Z79.899 HIGH RISK MEDICATION USE: ICD-10-CM

## 2019-05-10 DIAGNOSIS — Z12.11 SCREENING FOR COLON CANCER: ICD-10-CM

## 2019-05-10 DIAGNOSIS — Z12.39 SCREENING FOR BREAST CANCER: ICD-10-CM

## 2019-05-10 DIAGNOSIS — R20.0 NUMBNESS AND TINGLING OF BOTH LOWER EXTREMITIES: ICD-10-CM

## 2019-05-10 DIAGNOSIS — I87.333 CHRONIC VENOUS HYPERTENSION (IDIOPATHIC) WITH ULCER AND INFLAMMATION OF BILATERAL LOWER EXTREMITY (CODE) (HCC): ICD-10-CM

## 2019-05-10 DIAGNOSIS — F33.1 MODERATE EPISODE OF RECURRENT MAJOR DEPRESSIVE DISORDER (HCC): ICD-10-CM

## 2019-05-10 DIAGNOSIS — R20.2 NUMBNESS AND TINGLING OF BOTH LOWER EXTREMITIES: ICD-10-CM

## 2019-05-10 DIAGNOSIS — E66.01 MORBID OBESITY WITH BMI OF 50.0-59.9, ADULT (HCC): ICD-10-CM

## 2019-05-10 DIAGNOSIS — I10 ESSENTIAL HYPERTENSION: Primary | ICD-10-CM

## 2019-05-10 RX ORDER — TRAMADOL HYDROCHLORIDE 50 MG/1
TABLET ORAL
Qty: 45 TAB | Refills: 0 | Status: CANCELLED | OUTPATIENT
Start: 2019-05-10

## 2019-05-10 RX ORDER — TRAMADOL HYDROCHLORIDE 50 MG/1
50 TABLET ORAL
Qty: 90 TAB | Refills: 0 | Status: SHIPPED | OUTPATIENT
Start: 2019-05-10 | End: 2019-06-09

## 2019-05-10 NOTE — PATIENT INSTRUCTIONS
Fibromyalgia: Care Instructions  Your Care Instructions    Fibromyalgia is a painful condition that is not completely understood by medical experts. The cause of fibromyalgia is not known. It can make you feel tired and ache all over. It causes tender spots at specific points of the body that hurt only when you press on them. You may have trouble sleeping, as well as other symptoms. These problems can upset your work and home life. Symptoms tend to come and go, although they may never go away completely. Fibromyalgia does not harm your muscles, joints, or organs. Follow-up care is a key part of your treatment and safety. Be sure to make and go to all appointments, and call your doctor if you are having problems. It's also a good idea to know your test results and keep a list of the medicines you take. How can you care for yourself at home? · Exercise often. Walk, swim, or bike to help with pain and sleep problems and to make you feel better. · Try to get a good night's sleep. Go to bed and get up at the same time each day, whether you feel rested or not. Make sure you have a good mattress and pillow. · Reduce stress. Avoid things that cause you stress, if you can. If not, work at making them less stressful. Learn to use biofeedback, guided imagery, meditation, or other methods to relax. · Make healthy changes. Eat a balanced diet, quit smoking, and limit alcohol and caffeine. · Use a heating pad set on low or take warm baths or showers for pain. Using cold packs for up to 20 minutes at a time can also relieve pain. Put a thin cloth between the cold pack and your skin. A gentle massage might help too. · Be safe with medicines. Take your medicines exactly as prescribed. Call your doctor if you think you are having a problem with your medicine. Your doctor may talk to you about taking antidepressant medicines. These medicines may improve sleep, relieve pain, and in some cases treat depression.   · Learn about fibromyalgia. This makes coping easier. Then, take an active role in your treatment. · Think about joining a support group with others who have fibromyalgia to learn more and get support. When should you call for help? Watch closely for changes in your health, and be sure to contact your doctor if:    · You feel sad, helpless, or hopeless; lose interest in things you used to enjoy; or have other symptoms of depression.     · Your fibromyalgia symptoms get worse. Where can you learn more? Go to http://jac-jeff.info/. Enter V003 in the search box to learn more about \"Fibromyalgia: Care Instructions. \"  Current as of: Doris 3, 2018  Content Version: 11.9  © 4704-0259 [employer], [email or web address]. Care instructions adapted under license by [employer] (which disclaims liability or warranty for this information). If you have questions about a medical condition or this instruction, always ask your healthcare professional. [address] any warranty or liability for your use of this information.

## 2019-05-10 NOTE — PROGRESS NOTES
CC:   Chief Complaint   Patient presents with    Hypertension    Osteoarthritis       HISTORY OF PRESENT ILLNESS  Sierra Clinton is a 66 y.o. female. Presents for 3 month follow up evaluation. She has HTN, depression with anxiety, fibromyalgia, chronic pain, osteoarthritis at lumbar spine, morbid obesity, and numbness at feet and hands. She now has Medicaid for health insurance; was denied SSI disability but is appealing the decision. She complains of numbness at tingling ar both arms and legs; started at feet, then both legs but now hands and arms also involved. Has chronic LE edema. [facility]. Received compression garments that she has been using regularly. Also wears compression stockings. Is awaiting a pump for swelling at legs. States she was released from Lymphedema clinic and told not to return to October. Continues to have chronic left hip pain. Feels exhausted; does not sleep well night, broke her recliner. Wears Depends for urinary incontinence. Cardiovascular Review  The patient has hypertension. She reports taking medications as instructed, no medication side effects noted.  Diet and Lifestyle: generally follows a low sodium diet, sedentary.  Lab review: labs reviewed and discussed with patient.       Depression Review  Patient is seen for follow up of depression. Ongoing depressed mood, fatigue, improved anhedonia and anxiety. Treatment includes Prozac 20 mg daily.  She denies recurrent thoughts of death and suicidal thoughts without plan.  She experiences the following side effects from the treatment: none.  Was on Effexor previously but became too expensive.        Chronic Pain Review  Indio Schmitt RTC today to follow up on chronic pain.   We discussed her osteoarthritis, fibromyalgia, radiculopathy and spondylolisthesis that is affecting her back, bilateral hand, bilateral hips (left > right) and bilateral knees and worsening depression.  Significant changes since last visit: none.  She is  able to do her normal daily activities.  She reports the following adverse side effects: none. Ambulates with a cane.  Sleeps in a recliner. Clay Mcwilliams worsens with prolonged standing and prolonged sitting.  Takes Tramadol 50 mg 1 tab once daily for pain.  Lyrica did not help her symptoms.      Least pain over the last week has been 7/10.      Worst pain over the last week has been 10/10.      Opioid Risk Tool Reviewed: YES      Aberrant behaviors: None.        Urine Drug Screen: Up to date      Controlled substance agreement on file: YES.          reviewed:yes      Pill count is consistent with her prescription: yes      Concomitant use of a benzodiazepine: no      Active daily MME is 15 mg.      Naloxone prescription not warranted.       Soc Hx  . Has 1 adult daughter in good health. Her 2nd grandchild is due in Nov 2018. Helps her mother who lives in [facility] to schedule doctor's appointments. 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 Never smoker. Used to drink alcohol occasionally but stopped all alcohol in 2005. Denies recreational drug use.  Does not get exercise due to her chronic pain.     ROS     Patient Active Problem List   Diagnosis Code    HTN (hypertension) I10    Numbness of feet R20.0    Osteopenia M85.80    Fibromyalgia M79.7    Left sided sciatica M54.32    Generalized anxiety disorder F41.1    Lumbar herniated disc M51.26    Primary osteoarthritis of left hip M16.12    Primary osteoarthritis of both knees M17.0    Degenerative lumbar disc M51.36    Morbid obesity with BMI of 50.0-59.9, adult (HCC) E66.01, Z68.43    High risk medication use Z79.899    Moderate major depression (HCC) F32.1    Lymphedema I89.0    Chronic venous hypertension (idiopathic) with ulcer and inflammation of bilateral lower extremity (CODE) ([facility] I87.333    Cellulitis of left lower extremity without foot L03. 116    Cellulitis of right lower extremity without foot L03.115     Past Medical History:   Diagnosis Date    Depression with anxiety     Fibromyalgia     Hypertension     Morbid obesity ([facility]     Osteoarthritis of knee 2016    R knee X-ray 7/5/16 ([facility]) mod to severe tibiofemoral arthritis, mod patellofemoral arthritis    Osteoarthritis of left hip 2015    Osteopenia      No Known Allergies    Current Outpatient Medications   Medication Sig Dispense Refill    traMADol (ULTRAM) 50 mg tablet TAKE 1 TABLET BY MOUTH EVERY 8 HOURS AS NEEDED FOR PAIN 45 Tab 0    FLUoxetine (PROZAC) 40 mg capsule Take 1 Cap by mouth daily. 30 Cap 5    benazepril (LOTENSIN) 40 mg tablet Take 1 Tab by mouth daily. For blood pressure. 90 Tab 3    naproxen sodium (ALEVE) 220 mg cap Take  by mouth.  ascorbic acid, vitamin C, (VITAMIN C) 250 mg tablet Take 1,000 mg by mouth daily. PHYSICAL EXAM  Visit Vitals  /73 (BP 1 Location: Left arm, BP Patient Position: Sitting)   Pulse 83   Temp 98.9 °F (37.2 °C) (Oral)   Resp 18   Ht 5' 4\" (1.626 m)   Wt 307 lb (139.3 kg)   LMP 03/01/2019   SpO2 97%   BMI 52.70 kg/m²       General: Pleasant, morbidly obese, no distress. Ambulates with a cane. HEENT:  Head normocephalic/atraumatic, no scleral icterus  Neck: Supple. No carotid bruits, JVD, lymphadenopathy, or thyromegaly. Lungs:  Clear to ausculation bilaterally. Good air movement. Heart:  Regular rate and rhythm, normal S1 and S2, no murmur, gallop, or rub  Extremities: No clubbing, cyanosis. 1+ pitting bilateral LE edema. Neurological: Alert and oriented. Psychiatric: Normal mood and affect. Behavior is normal        ASSESSMENT AND PLAN    ICD-10-CM ICD-9-CM    1. Essential hypertension I10 401.9    2. Fibromyalgia M79.7 729.1 traMADol (ULTRAM) 50 mg tablet   3.  Chronic venous hypertension (idiopathic) with ulcer and inflammation of bilateral lower extremity (CODE) ([facility] I87.333 459.33    4. Moderate episode of recurrent major depressive disorder (HCC) F33.1 296.32    5. Generalized anxiety disorder F41.1 300.02    6. Primary osteoarthritis of left hip M16.12 715.15    7. Morbid obesity with BMI of 50.0-59.9, adult ([facility]) E66.01 278.01     Z68.43 V85.43    8. Numbness and tingling of both lower extremities R20.0 782.0 REFERRAL TO NEUROLOGY    R20.2     9. High risk medication use Z79.899 V58.69 COMPLIANCE DRUG SCREEN/PRESCRIPTION MONITORING   10. Screening for breast cancer Z12.31 V76.10 [facility] BI SCREENING INCL CAD   11. Screening for colon cancer Z12.11 V76.51      Diagnoses and all orders for this visit:    1. Essential hypertension  Well-controlled. Continue benazepril 40 mg daily. 2. Fibromyalgia  This is a chronic problem that is not changed. Per review of available records and patients , there are not sign of overuse, misuse, diversion, or concerning side effects. Today we reviewed: the goals of treatment (improve functionality, quality of life, and pain)  The following changes were made to the patients current treatment plan: nothing, medications refilled. -     Refill traMADol (ULTRAM) 50 mg tablet; Take 1 Tab by mouth every eight (8) hours as needed for Pain for up to 30 days. Max Daily Amount: 150 mg. TAKE 1 TABLET BY MOUTH EVERY 8 HOURS AS NEEDED FOR PAIN    3. Chronic venous hypertension (idiopathic) with ulcer and inflammation of bilateral lower extremity (CODE) ([facility])  Improved lymphedema. Ulcer healed. Continue compression socks and compression garments. 4. Moderate episode of recurrent major depressive disorder (HCC)  Controlled on Prozac 40 mg daily. 5. Generalized anxiety disorder  Controlled on Prozac 40 mg daily. 6. Primary osteoarthritis of left hip  Continue Tramadol and Aleve as needed. 7. Morbid obesity with BMI of 50.0-59.9, adult ([facility]  Discussed the patient's BMI with her.   The BMI follow up plan is as follows: dietary management education, guidance, and counseling; encourage exercise; monitor weight; prescribed dietary intake. Follow up BMI in 3 months. 8. Numbness and tingling of both lower extremities  -     REFERRAL TO NEUROLOGY    9. High risk medication use  -     COMPLIANCE DRUG SCREEN/PRESCRIPTION MONITORING    10. Screening for breast cancer  -     [facility] MAMMO BI SCREENING INCL CAD; Future    11. Screening for colon cancer    Greater than 40 mins direct face-to-face time spent with patient. Greater than 50% of time spent on counseling and coordination of care. Follow-up and Dispositions    · Return in about 3 months (around 8/10/2019), or if symptoms worsen or fail to improve, for FIbromyalgia, chronic pain. I have discussed the diagnosis with the patient and the intended plan as seen in the above orders. Patient is in agreement. The patient has received an after-visit summary and questions were answered concerning future plans. I have discussed medication side effects and warnings with the patient as well.

## 2019-05-10 NOTE — PROGRESS NOTES
Jeff Adams  Identified pt with two pt identifiers(name and ). Chief Complaint   Patient presents with    Hypertension    Osteoarthritis       1. Have you been to the ER, urgent care clinic since your last visit? Hospitalized since your last visit? NO    2. Have you seen or consulted any other health care providers outside of the [address] [place] [address] since your last visit? Include any pap smears or colon screening. NO    Today's provider has been notified of reason for visit, vitals and flowsheets obtained on patients. Patient received paperwork for advance directive during previous visit but has not completed at this time     Reviewed record In preparation for visit, huddled with provider and have obtained necessary documentation      Health Maintenance Due   Topic    Shingrix Vaccine Age 50> (1 of 2)    PAP AKA CERVICAL CYTOLOGY     BREAST CANCER SCRN MAMMOGRAM     FOBT Q 1 YEAR, 18+        Wt Readings from Last 3 Encounters:   19 309 lb 9.6 oz (140.4 kg)   19 306 lb 9.6 oz (139.1 kg)   19 310 lb (140.6 kg)     Temp Readings from Last 3 Encounters:   19 97.8 °F (36.6 °C)   19 98 °F (36.7 °C) (Oral)   18 97.7 °F (36.5 °C)     BP Readings from Last 3 Encounters:   19 135/78   19 124/57   18 146/85     Pulse Readings from Last 3 Encounters:   19 92   19 93   18 98     There were no vitals filed for this visit.       Learning Assessment:  :     Learning Assessment 2015 3/26/2014   PRIMARY LEARNER Patient Patient   HIGHEST LEVEL OF EDUCATION - PRIMARY LEARNER  GRADUATED HIGH SCHOOL OR GED GRADUATED HIGH SCHOOL OR GED   BARRIERS PRIMARY LEARNER NONE NONE   CO-LEARNER CAREGIVER No No   PRIMARY LANGUAGE ENGLISH ENGLISH   LEARNER PREFERENCE PRIMARY LISTENING VIDEOS   ANSWERED BY patient patient   RELATIONSHIP SELF SELF       Depression Screening:  :     3 most recent PHQ Screens 5/10/2019   Little interest or pleasure in doing things Nearly every day   Feeling down, depressed, irritable, or hopeless Nearly every day   Total Score PHQ 2 6   Trouble falling or staying asleep, or sleeping too much -   Feeling tired or having little energy -   Poor appetite, weight loss, or overeating -   Feeling bad about yourself - or that you are a failure or have let yourself or your family down -   Trouble concentrating on things such as school, work, reading, or watching TV -   Moving or speaking so slowly that other people could have noticed; or the opposite being so fidgety that others notice -   Thoughts of being better off dead, or hurting yourself in some way -   PHQ 9 Score -   How difficult have these problems made it for you to do your work, take care of your home and get along with others -       Fall Risk Assessment:  :     No flowsheet data found. Abuse Screening:  :     No flowsheet data found. ADL Screening:  :     ADL Assessment 3/27/2018   Feeding yourself No Help Needed   Getting from bed to chair No Help Needed   Getting dressed No Help Needed   Bathing or showering No Help Needed   Walk across the room (includes cane/walker) Help Needed   Using the telphone No Help Needed   Taking your medications No Help Needed   Preparing meals No Help Needed   Managing money (expenses/bills) No Help Needed   Moderately strenuous housework (laundry) No Help Needed   Shopping for personal items (toiletries/medicines) No Help Needed   Shopping for groceries No Help Needed   Driving No Help Needed   Climbing a flight of stairs No Help Needed   Getting to places beyond walking distances Help Needed                 Medication reconciliation up to date and corrected with patient at this time.

## 2019-05-16 LAB — DRUGS UR: NORMAL

## 2019-05-17 NOTE — PROGRESS NOTES
Message sent by My Chart:  Your urine drug screen showed you are taking Tramadol as expected.     Dr. Velázquez Service

## 2019-05-22 ENCOUNTER — TELEPHONE (OUTPATIENT)
Dept: NEUROLOGY | Age: 59
End: 2019-05-22

## 2019-05-22 NOTE — TELEPHONE ENCOUNTER
----- Message from Dany Dietz sent at 5/21/2019 11:26 AM EDT -----  Regarding: Dr. Ashkan Del Rosario pt returning a miss call from the nurse in regards to scheduling appt. Appt has been scheduled for Wednesday 06/26/2019, pt requested a sooner appt.    Best contact number [phone]

## 2019-06-26 ENCOUNTER — OFFICE VISIT (OUTPATIENT)
Dept: NEUROLOGY | Age: 59
End: 2019-06-26

## 2019-06-26 VITALS
HEIGHT: 64 IN | WEIGHT: 293 LBS | HEART RATE: 90 BPM | SYSTOLIC BLOOD PRESSURE: 140 MMHG | BODY MASS INDEX: 50.02 KG/M2 | OXYGEN SATURATION: 97 % | DIASTOLIC BLOOD PRESSURE: 90 MMHG

## 2019-06-26 DIAGNOSIS — E66.01 MORBID OBESITY WITH BMI OF 50.0-59.9, ADULT (HCC): ICD-10-CM

## 2019-06-26 DIAGNOSIS — M54.2 CERVICALGIA: ICD-10-CM

## 2019-06-26 DIAGNOSIS — M79.672 PAIN IN BOTH FEET: ICD-10-CM

## 2019-06-26 DIAGNOSIS — M79.671 PAIN IN BOTH FEET: ICD-10-CM

## 2019-06-26 DIAGNOSIS — G56.03 BILATERAL CARPAL TUNNEL SYNDROME: Primary | ICD-10-CM

## 2019-06-26 RX ORDER — TRAMADOL HYDROCHLORIDE 50 MG/1
50 TABLET ORAL
COMMUNITY
End: 2019-11-08 | Stop reason: SDUPTHER

## 2019-06-26 NOTE — PROGRESS NOTES
NEUROLOGY CLINIC NOTE    Patient ID:  Sonja Cruz  [id number]  3 y.o.  1960    Date of Consultation:  June 26, 2019    Referring Physician: Dr. Rudolph Henry    Reason for Consultation:  Hand and feet numbness    Chief Complaint   Patient presents with    New Patient     numbness in arms and legs       History of Present Illness:     Patient Active Problem List    Diagnosis Date Noted    Lymphedema 12/06/2018    Chronic venous hypertension (idiopathic) with ulcer and inflammation of bilateral lower extremity (CODE) ([facility] 12/06/2018    Cellulitis of left lower extremity without foot 12/06/2018    Cellulitis of right lower extremity without foot 12/06/2018    Moderate major depression ([facility] 09/18/2018    High risk medication use 12/04/2017    Primary osteoarthritis of left hip 06/14/2016    Primary osteoarthritis of both knees 06/14/2016    Degenerative lumbar disc 06/14/2016    Morbid obesity with BMI of 50.0-59.9, adult ([facility] 06/14/2016    Fibromyalgia 07/17/2015    Left sided sciatica 07/17/2015    Generalized anxiety disorder 07/17/2015    Lumbar herniated disc 07/17/2015    Osteopenia 06/24/2015    Numbness of feet 05/15/2014    HTN (hypertension) 03/26/2014     Past Medical History:   Diagnosis Date    Depression with anxiety     Fibromyalgia     Hypertension     Morbid obesity ([facility]     Osteoarthritis of knee 2016    R knee X-ray 7/5/16 ([facility]) mod to severe tibiofemoral arthritis, mod patellofemoral arthritis    Osteoarthritis of left hip 2015    Osteopenia       Past Surgical History:   Procedure Laterality Date   Farhad Vanegas Course    HX FREE SKIN GRAFT  1990    U    HX [address]      Prior to Admission medications    Medication Sig Start Date End Date Taking? Authorizing Provider   cholecalciferol, VITAMIN D3, (VITAMIN D3) 5,000 unit tab tablet Take  by mouth daily.    Yes Provider, Historical   traMADol (ULTRAM) 50 mg tablet Take 50 mg by mouth every six (6) hours as needed for Pain. Yes Provider, Historical   COLLAGEN by Does Not Apply route. Yes Provider, Historical   FLUoxetine (PROZAC) 40 mg capsule Take 1 Cap by mouth daily. 1/29/19  Yes Saul Souza MD   benazepril (LOTENSIN) 40 mg tablet Take 1 Tab by mouth daily. For blood pressure. 11/21/18  Yes Saul Souza MD   naproxen sodium (ALEVE) 220 mg cap Take  by mouth. Yes Other, MD Ilda   ascorbic acid, vitamin C, (VITAMIN C) 250 mg tablet Take 1,000 mg by mouth daily. Yes Provider, Historical     No Known Allergies   Social History     Tobacco Use    Smoking status: Never Smoker    Smokeless tobacco: Never Used   Substance Use Topics    Alcohol use: No     Alcohol/week: 0.0 oz     Comment: previously drank beer quit in 2005      Family History   Problem Relation Age of Onset    Diabetes Mother         degenerative disk disease/fibromyalgia/artheritis    Depression Mother     Anxiety Mother     Other Mother         Fibromyalgia    Other Maternal Aunt         lymphoma    Cancer Maternal Aunt     No Known Problems Father     Dementia Maternal Grandmother         Subjective:      Bee Abreu is a 66 y.o. RH female with history of lymphedema, cellulitis bilateral lower extremity, depression, morbid obesity, fibromyalgia, anxiety disorder, lumbar spondylosis and hypertension who was referred here by Dr. Pj Epps for further evaluation of her chronic hand feet paresthesia. Chronic issues with right hand and foot paresthesia since 2011. Previously seen by neurology in 5/15/2014 by Dr. Umair Mccoy for numbness and pain left side. Note mentions condition starting in 2011. Pain in her lower buttocks that went down the left leg. Was not able to seek treatment until November 2013. Given steroids by PCP with temporary relief. Note also mentions numbness and tingling of both hands and feet.   Note also mentions patient losing her job in 2013 and applying for disability. Plan was to do EMG/NCS. Started on Lyrica 50 mg BID. EMG/NCS of BUE and LLE done 7/1/2014 revealed mild bilateral CTS and question of possible left lower leg sensory neuropathy but technically difficult due to significant edema. Lumbar MRI without contrast done 7/6/2015 revealed mild left central disc herniation at T12-L1. L4-5 severe right and mild left facet hypertrophy with grade 1 spondylolisthesis. Mild bulging of disc. Mild right foraminal stenosis. Milder degenerative changes L2-3, L3-4 and L5-S1. No significant spinal stenosis or neuroforaminal stenosis. Ankle-brachial index testing done 1/10/2019 revealed no significant peripheral arterial disease. Lower extremity Doppler done 1/24/2019 revealed no venous thrombosis. Labs done 6/26/2018 CBC, hgbA1c,  Magnesium and vitamin D, TSH and free T4, elevated LDL and total cholesterol    Patient was last seen by her PCP 5/10/2019. Note mentions patient was denied Social Security disability and is appealing the decision. Complains of numbness and tingling in both arms and legs. Patient also having chronic pain involving her back, bilateral hips bilateral knees and hands. Patient is on tramadol with benefit. Since then per patient condition continues to persist.  Still having numbness and tingling of her hands and feet. Constant and not any worse. Denies any loss of muscle bulk or weakness. Also mentions widespread joint pain as well as neck and back pain. Mentions repeated application for disability. Currently complaining of 9/10 hip pain. Outside reports reviewed: office notes, radiology reports, lab reports, historical medical records.     Review of Systems:    A comprehensive review of systems was performed:   Constitutional: positive for fatigue   Eyes: positive for none  Ears, nose, mouth, throat, and face: positive for snoring  Respiratory: positive for shortness of breath   Cardiovascular: positive for leg swelling   Gastrointestinal: positive for none  Genitourinary: positive for incontinence   Integument/breast: positive for rash   Hematologic/lymphatic: positive for none  Musculoskeletal: positive for muscle pain, joint pain, weakness, neuropathy  Neurological: positive for falls, memory loss  Behavioral/Psych: positive for anxiety, depression   Endocrine: positive for none  Allergic/Immunologic: positive for none      Objective:     Visit Vitals  /90   Pulse 90   Ht 5' 4\" (1.626 m)   Wt 309 lb (140.2 kg)   SpO2 97%   BMI 53.04 kg/m²     PHYSICAL EXAM:    General Appearance: Alert, patient appears stated age. General:  Morbidly obese, patient in no apparent distress. Head/Face: The head is normocephalic and atraumatic. Eyes: Conjunctivae appear normal. Sclera appear normal and non-icteric. Nose (and Sinus):   No abnormality of the nose or sinuses is noted. Oral:   Throat is clear. Lymphatics:  No lymphadenopathy in the neck/head. Neck and Thyroid:   No bruits noted in the neck. Respiratory:  Lungs clear to auscultation. Cardiovascular:  Palpation and auscultation: regular rate and rhythm. Extremity: No joint swelling. LE erythema and edema. NEUROLOGICAL EXAM:    Appearance: The patient is morbidly obese, provides a coherent history and is in no acute distress. Mental Status: Oriented to time, place and person. Fluent, no aphasia or dysarthria. Mood and affect appropriate. Cranial Nerves:   Intact visual fields. JULIO, EOM's full, no nystagmus, no ptosis. Facial sensation is normal. Corneal reflexes are intact. Facial movement is symmetric. Hearing is normal bilaterally. Palate is midline with normal elevation. Sternocleidomastoid and trapezius muscles are normal. Tongue is midline. Motor:  5/5 strength in upper and lower proximal and distal muscles. Normal bulk and tone. No fasciculations. No pronator drift.     Reflexes:   Deep tendon reflexes 1+/4 and symmetrical. Downgoing toes. Sensory:   Normal to cold, pinprick and vibration. Gait:  Normal gait. No Romberg. Tremor:   No tremor noted. Cerebellar:  Intact FTN/KHOA/HTS. Neurovascular:  Normal heart sounds and regular rhythm, peripheral pulses intact, and no carotid bruits. Imaging  Lumbar MRI: reviewed    Labs Reviewed      Assessment:   Bilateral carpal tunnel syndrome  Feet pain  Cervicalgia  Morbid obesity    Plan:   Neurological examination is nonfocal.  No evidence of any glove stocking sensory deficit typically seen in peripheral neuropathies. Patient has had previous EMG done which revealed mild bilateral carpal tunnel syndrome. Discussed with the patient repeating testing to assess progression. Patient deferred. Patient was advised to use nighttime wrist splints and given exercises to do. No evidence of stocking sensory deficit. Patient does have lower extremity lymphedema. Feet pain is likely more related to this and her obesity. Clearly at risk at some point to develop some neuropathy. Recommend podiatry evaluation if not yet done. Patient is currently on medication that helps her neuropathic discomforts. Advised to do structured routine exercise especially aquatics. Patient with chronic neck pain likely due to poor anterior head posture. Advised to correct her anterior head posture. Use headrest at home and while driving. Do not carry anything on her shoulder. Use wireless headsets. Use only one pillow at most when sleeping. Patient was given neck and shoulder exercises to do. Cervical x-ray was ordered to assess for any spondylosis. Further intervention be done pending results of x-ray. Patient would likely benefit from care under physical medicine and rehab for overall aches and pains. Discussed with patient bariatric surgery referral and evaluation to help with her morbid obesity and also help with her chronic pain issues.   All questions and concerns were answered. Visit lasted 60 minutes. Greater than 50% was spent reviewing her medical records as summarized above, discussion about her condition, potential etiology, prognosis, discussion about repeat EMG the patient deferred, cervical x-ray, discussion about posture changes, neck and shoulder exercises, structured physical exercises, referral to bariatric surgery    This note was created using voice recognition software. Despite editing, there may be syntax errors.

## 2019-06-26 NOTE — LETTER
7/11/19 Patient: Fab Martinez YOB: 1960 Date of Visit: 6/26/2019 Robert Saucedo MD 
[address] [address] VIA In Basket Dear Robert Saucedo MD, Thank you for referring Ms. Karen Joe to [address] [place] [address] for evaluation. My notes for this consultation are attached. If you have questions, please do not hesitate to call me. I look forward to following your patient along with you. Sincerely, Angel Pozo MD

## 2019-06-26 NOTE — PATIENT INSTRUCTIONS
Learning About How to Have a Healthy Back  What causes back pain? Back pain is often caused by overuse, strain, or injury. For example, people often hurt their backs playing sports or working in the yard, being jolted in a car accident, or lifting something too heavy. Aging plays a part too. Your bones and muscles tend to lose strength as you age, which makes injury more likely. The spongy discs between the bones of the spine (vertebrae) may suffer from wear and tear and no longer provide enough cushion between the bones. A disc that bulges or breaks open (herniated disc) can press on nerves, causing back pain. In some people, back pain is the result of arthritis, broken vertebrae caused by bone loss (osteoporosis), illness, or a spine problem. Although most people have back pain at one time or another, there are steps you can take to make it less likely. How can you have a healthy back? Reduce stress on your back through good posture  Slumping or slouching alone may not cause low back pain. But after the back has been strained or injured, bad posture can make pain worse. · Sleep in a position that maintains your back's normal curves and on a mattress that feels comfortable. Sleep on your side with a pillow between your knees, or sleep on your back with a pillow under your knees. These positions can reduce strain on your back. · Stand and sit up straight. \"Good posture\" generally means your ears, shoulders, and hips are in a straight line. · If you must stand for a long time, put one foot on a stool, ledge, or box. Switch feet every now and then. · Sit in a chair that is low enough to let you place both feet flat on the floor with both knees nearly level with your hips. If your chair or desk is too high, use a footrest to raise your knees. Place a small pillow, a rolled-up towel, or a lumbar roll in the curve of your back if you need extra support.   · Try a kneeling chair, which helps tilt your hips forward. This takes pressure off your lower back. · Try sitting on an exercise ball. It can rock from side to side, which helps keep your back loose. · When driving, keep your knees nearly level with your hips. Sit straight, and drive with both hands on the steering wheel. Your arms should be in a slightly bent position. Reduce stress on your back through careful lifting  · Squat down, bending at the hips and knees only. If you need to, put one knee to the floor and extend your other knee in front of you, bent at a right angle (half kneeling). · Press your chest straight forward. This helps keep your upper back straight while keeping a slight arch in your low back. · Hold the load as close to your body as possible, at the level of your belly button (navel). · Use your feet to change direction, taking small steps. · Lead with your hips as you change direction. Keep your shoulders in line with your hips as you move. · Set down your load carefully, squatting with your knees and hips only. Exercise and stretch your back  · Do some exercise on most days of the week, if your doctor says it is okay. You can walk, run, swim, or cycle. · Stretch your back muscles. Here are a few exercises to try:  ? Lie on your back, and gently pull one bent knee to your chest. Put that foot back on the floor, and then pull the other knee to your chest.  ? Do pelvic tilts. Lie on your back with your knees bent. Tighten your stomach muscles. Pull your belly button (navel) in and up toward your ribs. You should feel like your back is pressing to the floor and your hips and pelvis are slightly lifting off the floor. Hold for 6 seconds while breathing smoothly. ? Sit with your back flat against a wall. · Keep your core muscles strong. The muscles of your back, belly (abdomen), and buttocks support your spine. ? Pull in your belly and imagine pulling your navel toward your spine. Hold this for 6 seconds, then relax.  Remember to keep breathing normally as you tense your muscles. ? Do curl-ups. Always do them with your knees bent. Keep your low back on the floor, and curl your shoulders toward your knees using a smooth, slow motion. Keep your arms folded across your chest. If this bothers your neck, try putting your hands behind your neck (not your head), with your elbows spread apart. ? Lie on your back with your knees bent and your feet flat on the floor. Tighten your belly muscles, and then push with your feet and raise your buttocks up a few inches. Hold this position 6 seconds as you continue to breathe normally, then lower yourself slowly to the floor. Repeat 8 to 12 times. ? If you like group exercise, try Pilates or yoga. These classes have poses that strengthen the core muscles. Lead a healthy lifestyle  · Stay at a healthy weight to avoid strain on your back. · Do not smoke. Smoking increases the risk of osteoporosis, which weakens the spine. If you need help quitting, talk to your doctor about stop-smoking programs and medicines. These can increase your chances of quitting for good. Where can you learn more? Go to http://jacHalobandjeff.info/. Enter L315 in the search box to learn more about \"Learning About How to Have a Healthy Back. \"  Current as of: September 20, 2018  Content Version: 11.9  © 6439-0219 [employer]. Care instructions adapted under license by [email or web address] (which disclaims liability or warranty for this information). If you have questions about a medical condition or this instruction, always ask your healthcare professional. [place] [address] any warranty or liability for your use of this information. Learning About Bariatric Surgery  What is bariatric surgery? Bariatric surgery is surgery to help you lose weight.  This type of surgery is only used for people who are very overweight and have not been able to lose weight with diet and exercise. This surgery makes the stomach smaller. Some types of surgery also change the connection between your stomach and intestines. How is bariatric surgery done? Bariatric surgery may be either \"open\" or \"laparoscopic. \" Open surgery is done through a large cut (incision) in the belly. Laparoscopic surgery is done through several small cuts. The doctor puts a lighted tube, or scope, and other surgical tools through small cuts in your belly. The doctor is able to see your organs with the scope. There are different types of bariatric surgery. Gastric sleeve surgery  The surgery is usually done through several small incisions in the belly. The doctor removes more than half of your stomach. This leaves a thin sleeve, or tube, that is about the size of a banana. Because part of your stomach has been removed, this can't be reversed. Celine-en-Y gastric bypass surgery  Celine-en-Y (say \"esau-en-why\") surgery changes the connection between the stomach and the intestines. The doctor separates a section of your stomach from the rest of your stomach. This makes a small pouch. The new pouch will hold the food you eat. The doctor connects the stomach pouch to the middle part of the small intestine. Gastric banding surgery  The surgery is usually done through several small incisions in the belly. The doctor wraps a band around the upper part of the stomach. This creates a small pouch. The small size of the pouch means that you will get full after you eat just a small amount of food. The doctor can inflate or deflate the band to adjust the size. This lets the doctor adjust how quickly food passes from the new pouch into the stomach. It does not change the connection between the stomach and the intestines. What can you expect after the surgery? You may stay in the hospital for one or more days after the surgery. How long you stay depends on the type of surgery you had.   Most people need 2 to 4 weeks before they are ready to get back to their usual routine. For the first 2 to 6 weeks after surgery, you probably will need to follow a liquid or soft diet. Bit by bit, you will be able to eat more solid foods. Your doctor may advise you to work with a dietitian. This way you'll be sure to get enough protein, vitamins, and minerals while you are losing weight. Even with a healthy diet, you may need to take vitamin and mineral supplements. After surgery, you will not be able to eat very much at one time. You will get full quickly. Try not to eat too much at one time or eat foods that are high in fat or sugar. If you do, you may vomit, get stomach pain, or have diarrhea. You probably will lose weight very quickly in the first few months after surgery. As time goes on, your weight loss will slow down. You will have regular doctor visits to check how you are doing. Think of bariatric surgery as a tool to help you lose weight. It isn't an instant fix. You will still need to eat a healthy diet and get regular exercise. This will help you reach your weight goal and avoid regaining the weight you lose. Follow-up care is a key part of your treatment and safety. Be sure to make and go to all appointments, and call your doctor if you are having problems. It's also a good idea to know your test results and keep a list of the medicines you take. Where can you learn more? Go to http://jac-jeff.info/. Enter G469 in the search box to learn more about \"Learning About Bariatric Surgery. \"  Current as of: June 25, 2018  Content Version: 11.9  © 9938-4030 [employer]. Care instructions adapted under license by [employer] (which disclaims liability or warranty for this information). If you have questions about a medical condition or this instruction, always ask your healthcare professional. [address] any warranty or liability for your use of this information.          Carpal Tunnel Syndrome: Exercises  Your Care Instructions  Here are some examples of typical rehabilitation exercises for your condition. Start each exercise slowly. Ease off the exercise if you start to have pain. Your doctor or your physical or occupational therapist will tell you when you can start these exercises and which ones will work best for you. Warm-up stretches  When you no longer have pain or numbness, you can do exercises to help prevent carpal tunnel syndrome from coming back. Do not do any stretch or movement that is uncomfortable or painful. 1. Rotate your wrist up, down, and from side to side. Repeat 4 times. 2. Stretch your fingers far apart. Relax them, and then stretch them again. Repeat 4 times. 3. Stretch your thumb by pulling it back gently, holding it, and then releasing it. Repeat 4 times. How to do the exercises  Prayer stretch    1. Start with your palms together in front of your chest just below your chin. 2. Slowly lower your hands toward your waistline, keeping your hands close to your stomach and your palms together until you feel a mild to moderate stretch under your forearms. 3. Hold for at least 15 to 30 seconds. Repeat 2 to 4 times. Wrist flexor stretch    1. Extend your arm in front of you with your palm up. 2. Bend your wrist, pointing your hand toward the floor. 3. With your other hand, gently bend your wrist farther until you feel a mild to moderate stretch in your forearm. 4. Hold for at least 15 to 30 seconds. Repeat 2 to 4 times. Wrist extensor stretch    1. Repeat steps 1 through 4 of the stretch above, but begin with your extended hand palm down. Follow-up care is a key part of your treatment and safety. Be sure to make and go to all appointments, and call your doctor if you are having problems. It's also a good idea to know your test results and keep a list of the medicines you take. Where can you learn more?   Go to http://jac-jeff.info/. Enter M227 in the search box to learn more about \"Carpal Tunnel Syndrome: Exercises. \"  Current as of: September 20, 2018  Content Version: 11.9  © 8888-2914 [employer]. Care instructions adapted under license by [employer] (which disclaims liability or warranty for this information). If you have questions about a medical condition or this instruction, always ask your healthcare professional. [place] [address] any warranty or liability for your use of this information. Neck: Exercises  Your Care Instructions  Here are some examples of typical rehabilitation exercises for your condition. Start each exercise slowly. Ease off the exercise if you start to have pain. Your doctor or physical therapist will tell you when you can start these exercises and which ones will work best for you. How to do the exercises  Neck stretch    4. This stretch works best if you keep your shoulder down as you lean away from it. To help you remember to do this, start by relaxing your shoulders and lightly holding on to your thighs or your chair. 5. Tilt your head toward your shoulder and hold for 15 to 30 seconds. Let the weight of your head stretch your muscles. 6. If you would like a little added stretch, use your hand to gently and steadily pull your head toward your shoulder. For example, keeping your right shoulder down, lean your head to the left. 7. Repeat 2 to 4 times toward each shoulder. Diagonal neck stretch    4. Turn your head slightly toward the direction you will be stretching, and tilt your head diagonally toward your chest and hold for 15 to 30 seconds. 5. If you would like a little added stretch, use your hand to gently and steadily pull your head forward on the diagonal.  6. Repeat 2 to 4 times toward each side. Dorsal glide stretch    5. Sit or stand tall and look straight ahead.   6. Slowly tuck your chin as you glide your head backward over your body  7. Hold for a count of 6, and then relax for up to 10 seconds. 8. Repeat 8 to 12 times. Chest and shoulder stretch    2. Sit or stand tall and glide your head backward as in the dorsal glide stretch. 3. Raise both arms so that your hands are next to your ears. 4. Take a deep breath, and as you breathe out, lower your elbows down and behind your back. You will feel your shoulder blades slide down and together, and at the same time you will feel a stretch across your chest and the front of your shoulders. 5. Hold for about 6 seconds, and then relax for up to 10 seconds. 6. Repeat 8 to 12 times. Strengthening: Hands on head    1. Move your head backward, forward, and side to side against gentle pressure from your hands, holding each position for about 6 seconds. 2. Repeat 8 to 12 times. Follow-up care is a key part of your treatment and safety. Be sure to make and go to all appointments, and call your doctor if you are having problems. It's also a good idea to know your test results and keep a list of the medicines you take. Where can you learn more? Go to http://jac-jeff.info/. Enter P975 in the search box to learn more about \"Neck: Exercises. \"  Current as of: September 20, 2018  Content Version: 11.9  © 1090-8469 [employer]. Care instructions adapted under license by [employer] (which disclaims liability or warranty for this information). If you have questions about a medical condition or this instruction, always ask your healthcare professional. [place] [address] any warranty or liability for your use of this information. Foot Pain: Care Instructions  Your Care Instructions  Foot injuries that cause pain and swelling are fairly common. Almost all sports or home repair projects can cause a misstep that ends up as foot pain.  Normal wear and tear, especially as you get older, also can cause foot pain. Most minor foot injuries will heal on their own, and home treatment is usually all you need to do. If you have a severe injury, you may need tests and treatment. Follow-up care is a key part of your treatment and safety. Be sure to make and go to all appointments, and call your doctor if you are having problems. It's also a good idea to know your test results and keep a list of the medicines you take. How can you care for yourself at home? · Take pain medicines exactly as directed. ? If the doctor gave you a prescription medicine for pain, take it as prescribed. ? If you are not taking a prescription pain medicine, ask your doctor if you can take an over-the-counter medicine. · Rest and protect your foot. Take a break from any activity that may cause pain. · Put ice or a cold pack on your foot for 10 to 20 minutes at a time. Put a thin cloth between the ice and your skin. · Prop up the sore foot on a pillow when you ice it or anytime you sit or lie down during the next 3 days. Try to keep it above the level of your heart. This will help reduce swelling. · Your doctor may recommend that you wrap your foot with an elastic bandage. Keep your foot wrapped for as long as your doctor advises. · If your doctor recommends crutches, use them as directed. · Wear roomy footwear. · As soon as pain and swelling end, begin gentle exercises of your foot. Your doctor can tell you which exercises will help. When should you call for help? Call 911 anytime you think you may need emergency care. For example, call if:    · Your foot turns pale, white, blue, or cold.    Call your doctor now or seek immediate medical care if:    · You cannot move or stand on your foot.     · Your foot looks twisted or out of its normal position.     · Your foot is not stable when you step down.     · You have signs of infection, such as:  ? Increased pain, swelling, warmth, or redness.   ? Red streaks leading from the sore area.  ? Pus draining from a place on your foot. ? A fever.     · Your foot is numb or tingly.    Watch closely for changes in your health, and be sure to contact your doctor if:    · You do not get better as expected.     · You have bruises from an injury that last longer than 2 weeks. Where can you learn more? Go to http://jac-jeff.info/. Enter Z628 in the search box to learn more about \"Foot Pain: Care Instructions. \"  Current as of: September 20, 2018  Content Version: 11.9  © 6765-7932 [employer]. Care instructions adapted under license by [employer] (which disclaims liability or warranty for this information). If you have questions about a medical condition or this instruction, always ask your healthcare professional. [place] [address] any warranty or liability for your use of this information. Shoulder Blade: Exercises  Your Care Instructions  Here are some examples of typical exercises for your condition. Start each exercise slowly. Ease off the exercise if you start to have pain. Your doctor or physical therapist will tell you when you can start these exercises and which ones will work best for you. How to do the exercises  Shoulder roll    1. Stand tall with your chin slightly tucked. Imagine that a string at the top of your head is pulling you straight up. 2. Keep your arms relaxed. All motion will be in your shoulders. 3. Shrug your shoulders up toward your ears, then up and back. [place] your shoulders down and back, like you're sliding your hands down into your back pants pockets. 4. Repeat the circles at least 2 to 4 times. 5. This exercise is also helpful anytime you want to relax. Lower neck and upper back stretch    1. With your arms about shoulder height, clasp your hands in front of you. 2. Drop your chin toward your chest.  3. Reach straight forward so you are rounding your upper back.  Think about pulling your shoulder blades apart. [place] feel a stretch across your upper back and shoulders. Hold for at least 6 seconds. 4. Repeat 2 to 4 times. Triceps stretch    1. Reach your arm straight up. 2. Keeping your elbow in place, bend your arm and reach your hand down behind your back. 3. With your other hand, apply gentle pressure to the bent elbow. Lisa Allen feel a stretch at the back of your upper arm and shoulder. Hold about 6 seconds. 4. Repeat 2 to 4 times with each arm. Shoulder stretch    1. Relax your shoulders. 2. Raise one arm to shoulder height, and reach it across your chest.  3. Pull the arm slightly toward you with your other arm. This will help you get a gentle stretch. Hold for about 6 seconds. 4. Repeat 2 to 4 times. Shoulder blade squeeze    1. Sit or stand up tall with your arms at your sides. 2. Keep your shoulders relaxed and down, not shrugged. 3. Squeeze your shoulder blades together. Hold for 6 seconds, then relax. 4. Repeat 8 to 12 times. Straight-arm shoulder blade squeeze    1. Sit or stand tall. Relax your shoulders. 2. With palms down, hold your elastic tubing or band straight out in front of you. 3. Start with slight tension in the tubing or band, with your hands about shoulder-width apart. 4. Slowly pull straight out to the sides, squeezing your shoulder blades together. Keep your arms straight and at shoulder height. Slowly release. 5. Repeat 8 to 12 times. Rowing    1. [place] your elastic tubing or band at about waist height. Take one end in each hand. 2. Sit or stand with your feet hip-width apart. 3. Hold your arms straight in front of you. Adjust your distance to create slight tension in the tubing or band. 4. Slightly tuck your chin. Relax your shoulders. 5. Without shrugging your shoulders, pull straight back. Your elbows will pass alongside your waist.    Pull-downs    1. [place] your elastic tubing or band in the top of a closed door.  Take one end in each hand.  2. Either sit or stand, depending on what is more comfortable. If you feel unsteady, sit on a chair. 3. Start with your arms up and comfortably apart, elbows straight. There should be a slight tension in the tubing or band. 4. Slightly tuck your chin, and look straight ahead. 5. Keeping your back straight, slowly pull down and back, bending your elbows. 6. Stop where your hands are level with your chin, in a \"goalpost\" position. 7. Repeat 8 to 12 times. Chest T stretch    1. Lie on your back. Raise your knees so they are bent. Plant your feet on the floor, hip-width apart. 2. Tuck your chin, and relax your shoulders. 3. Reach your arms straight out to the sides. If you don't feel a mild stretch in your shoulders and across your chest, use a foam roll or a tightly rolled blanket under your spine, from your tailbone to your head. 4. Relax in this position for at least 15 to 30 seconds while you breathe normally. Repeat 2 to 4 times. 5. As you get used to this stretch, keep adding a little more time until you are able relax in this position for 2 or 3 minutes. When you can relax for at least 2 minutes, you only need to do the exercise 1 time per session. Chest goalpost stretch    1. Lie on your back. Raise your knees so they are bent. Plant your feet on the floor, hip-width apart. 2. Tuck your chin, and relax your shoulders. 3. Reach your arms straight out to the sides. 4. Bend your arms at the elbows, with your hands pointed toward the top of your head. Your arms should make an L on either side of your head. Your palms should be facing up. 5. If you don't feel a mild stretch in your shoulders and across your chest, use a foam roll or tightly rolled blanket under your spine, from your tailbone to your head. 6. Relax in this position for at least 15 to 30 seconds while you breathe normally. Repeat 2 to 4 times.   7. Each day you do this exercise, add a little more time until you can relax in this position for 2 or 3 minutes. When you can relax for at least 2 minutes, you only need to do the exercise 1 time per session. Follow-up care is a key part of your treatment and safety. Be sure to make and go to all appointments, and call your doctor if you are having problems. It's also a good idea to know your test results and keep a list of the medicines you take. Where can you learn more? Go to http://jac-jeff.info/. Enter ([phone] 9624 [id number] in the search box to learn more about \"Shoulder Blade: Exercises. \"  Current as of: September 20, 2018  Content Version: 11.9  © 7005-9405 [employer]. Care instructions adapted under license by [employer] (which disclaims liability or warranty for this information). If you have questions about a medical condition or this instruction, always ask your healthcare professional. [address] any warranty or liability for your use of this information.

## 2019-06-27 ENCOUNTER — TELEPHONE (OUTPATIENT)
Dept: NEUROLOGY | Age: 59
End: 2019-06-27

## 2019-07-11 ENCOUNTER — HOSPITAL ENCOUNTER (OUTPATIENT)
Dept: GENERAL RADIOLOGY | Age: 59
Discharge: HOME | End: 2019-07-11
Payer: COMMERCIAL

## 2019-07-11 DIAGNOSIS — M54.2 CERVICALGIA: ICD-10-CM

## 2019-07-11 PROCEDURE — 72050 X-RAY EXAM NECK SPINE 4/5VWS: CPT

## 2019-08-09 ENCOUNTER — OFFICE VISIT (OUTPATIENT)
Dept: INTERNAL MEDICINE CLINIC | Facility: CLINIC | Age: 59
End: 2019-08-09

## 2019-08-09 VITALS
RESPIRATION RATE: 18 BRPM | SYSTOLIC BLOOD PRESSURE: 146 MMHG | HEART RATE: 90 BPM | BODY MASS INDEX: 53.18 KG/M2 | OXYGEN SATURATION: 95 % | WEIGHT: 293 LBS | TEMPERATURE: 98.2 F | DIASTOLIC BLOOD PRESSURE: 84 MMHG

## 2019-08-09 DIAGNOSIS — E55.9 VITAMIN D DEFICIENCY: ICD-10-CM

## 2019-08-09 DIAGNOSIS — I89.0 LYMPHEDEMA OF BOTH LOWER EXTREMITIES: ICD-10-CM

## 2019-08-09 DIAGNOSIS — M54.12 CERVICAL RADICULOPATHY: ICD-10-CM

## 2019-08-09 DIAGNOSIS — I10 ESSENTIAL HYPERTENSION: ICD-10-CM

## 2019-08-09 DIAGNOSIS — R20.0 NUMBNESS OF FEET: ICD-10-CM

## 2019-08-09 DIAGNOSIS — M79.7 FIBROMYALGIA: ICD-10-CM

## 2019-08-09 DIAGNOSIS — Z12.11 SCREENING FOR COLON CANCER: ICD-10-CM

## 2019-08-09 DIAGNOSIS — F32.1 MODERATE MAJOR DEPRESSION (HCC): ICD-10-CM

## 2019-08-09 DIAGNOSIS — Z12.39 SCREENING FOR BREAST CANCER: ICD-10-CM

## 2019-08-09 DIAGNOSIS — R53.82 CHRONIC FATIGUE: ICD-10-CM

## 2019-08-09 DIAGNOSIS — E78.2 MIXED HYPERLIPIDEMIA: Primary | ICD-10-CM

## 2019-08-09 DIAGNOSIS — R73.02 IGT (IMPAIRED GLUCOSE TOLERANCE): ICD-10-CM

## 2019-08-09 RX ORDER — GABAPENTIN 300 MG/1
300 CAPSULE ORAL
Qty: 30 CAP | Refills: 1 | Status: SHIPPED | OUTPATIENT
Start: 2019-08-09 | End: 2019-10-15 | Stop reason: SDUPTHER

## 2019-08-09 NOTE — PROGRESS NOTES
CC:   Chief Complaint   Patient presents with    Fibromyalgia     3 month follow up    Pain (Chronic)     3 month follow up       [address] AMMY Rodriguez is a 66 y.o. female. Presents for 3 month follow up evaluation. She has HTN, depression with anxiety, fibromyalgia, chronic pain, osteoarthritis at lumbar spine, morbid obesity, and idiopathic polyneuropathy.  She now has Medicaid for health insurance; was denied SSI disability but is appealing the decision. She complains of numbness at tingling at both arms and legs. Saw Dr. Paul Ayon on 6/26/19. He diagnosed her with bilateral carpal tunnel and offered her EMG/NCV but she declined. States that he recommended she see a podiatrist.  She also complains of much fatigue. Would like to get tested for Cushings syndrome. Reports she had a fall on 7/24/19 when getting out of her car; scraped her right leg, no serious injury. She ended her part-time job in April because she was having a hard time keeping up with the work. Has been helping care for her mother who lives in [facility]. Has chronic LE edema. [facility]. Received compression garments but not using them regularly because she has a hard time bending over to put them on. Has a pump for leg swelling at home but does not know how to use it. Continues to have chronic left hip pain. Wears Depends for urinary incontinence.     Cardiovascular Review  The patient has hypertension. She reports taking medications as instructed, no medication side effects noted.  Diet and Lifestyle: generally follows a low sodium diet, sedentary.  Lab review: labs reviewed and discussed with patient.       Depression Review  Patient is seen for follow up of depression. Improved depressed mood, anhedonia and anxiety.   Treatment includes Prozac 40 mg daily.  She denies recurrent thoughts of death and suicidal thoughts without plan.  She experiences the following side effects from the treatment: none.          Chronic Pain Review  Jonatantoño Schmitt RTC today to follow up on chronic pain.  We discussed her osteoarthritis, fibromyalgia, radiculopathy and spondylolisthesis that is affecting her back, arms, hips (left > right) and legs.  Significant changes since last visit: none.  She is  able to do her normal daily activities.  She reports the following adverse side effects: none.  Ambulates with a cane.  Sleeps in a recliner.  Pain worsens with prolonged standing and prolonged sitting.  Takes Tramadol 50 mg 1 tab once daily as needed for pain.  Lyrica did not help her symptoms.      Least pain over the last week has been 7/10.      Worst pain over the last week has been 10/10.      Opioid Risk Tool Reviewed: YES      Aberrant behaviors: None.        Urine Drug Screen: Up to date      Controlled substance agreement on file: YES.          reviewed:yes      Pill count is consistent with her prescription: yes      Concomitant use of a benzodiazepine: no      Active daily MME is 15 mg.      Naloxone prescription not warranted.       Soc Hx  . Has 1 daughter and 2 grandchildren. Helps her mother who lives in [facility] to schedule doctor's appointments. 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 Never smoker. Denies alcohol or recreational drug use.  Does not get exercise due to her chronic pain.     Health Maintenance  Flu vaccine: declined  Pneumonia vaccine: declined  Tetanus vaccine: declined     Zoster vaccine: due for this, she will check at her pharmacy  Pap smear: due for this, she wants to postpone due to her hip pain  Colonoscopy: 4/10/17, FIT negative, due for this  Mammogram: due for this (ordered 5/10/19)  Bone density: 7/29/15, normal  Lipids: 6/28/18 (tot chol 209, )  A1c: 6/26/18 (5.6%)  Advanced Directives: given information previously  End of Life: given information previously      ROS  A complete review of systems was performed and is negative except for those mentioned in the HPI. Patient Active Problem List   Diagnosis Code    HTN (hypertension) I10    Numbness of feet R20.0    Osteopenia M85.80    Fibromyalgia M79.7    Left sided sciatica M54.32    Generalized anxiety disorder F41.1    Lumbar herniated disc M51.26    Primary osteoarthritis of left hip M16.12    Primary osteoarthritis of both knees M17.0    Degenerative lumbar disc M51.36    Morbid obesity with BMI of 50.0-59.9, adult ([facility]) E66.01, Z68.43    High risk medication use Z79.899    Moderate major depression ([facility]) F32.1    Lymphedema I89.0    Chronic venous hypertension (idiopathic) with ulcer and inflammation of bilateral lower extremity (CODE) ([facility]) I87.333    Cellulitis of left lower extremity without foot L03. 116    Cellulitis of right lower extremity without foot L03.115     Past Medical History:   Diagnosis Date    Depression with anxiety     Fibromyalgia     Hypertension     Morbid obesity ([facility] [address]     Osteoarthritis of knee 2016    R knee X-ray 7/5/16 ([facility]) mod to severe tibiofemoral arthritis, mod patellofemoral arthritis    Osteoarthritis of left hip 2015    Osteopenia      No Known Allergies    Current Outpatient Medications   Medication Sig Dispense Refill    cholecalciferol, VITAMIN D3, (VITAMIN D3) 5,000 unit tab tablet Take  by mouth daily.  traMADol (ULTRAM) 50 mg tablet Take 50 mg by mouth every six (6) hours as needed for Pain.  COLLAGEN by Does Not Apply route.  FLUoxetine (PROZAC) 40 mg capsule Take 1 Cap by mouth daily. 30 Cap 5    benazepril (LOTENSIN) 40 mg tablet Take 1 Tab by mouth daily. For blood pressure. 90 Tab 3    naproxen sodium (ALEVE) 220 mg cap Take  by mouth.  ascorbic acid, vitamin C, (VITAMIN C) 250 mg tablet Take 1,000 mg by mouth daily.            PHYSICAL EXAM  Visit Vitals  /84 (BP 1 Location: Right arm, BP Patient Position: Sitting)   Pulse 90   Temp 98.2 °F (36.8 °C) (Oral)   Resp 18   Ht (P) 5' 4\" (1.626 m)   Wt 309 lb 12.8 oz (140.5 kg)   SpO2 95%   BMI (P) 53.18 kg/m²       General: Morbidly obese, no distress. Ambulates with a cane. HEENT:  Head normocephalic/atraumatic, no scleral icterus  Neck: Supple. No carotid bruits, JVD, lymphadenopathy, or thyromegaly. Lungs:  Clear to ausculation bilaterally. Good air movement. Heart:  Regular rate and rhythm, normal S1 and S2, no murmur, gallop, or rub  Extremities: No clubbing, cyanosis. 1+ pitting bilateral ankle edema. Neurological: Alert and oriented. Psychiatric: Normal mood and affect. Behavior is normal         ASSESSMENT AND PLAN    ICD-10-CM ICD-9-CM    1. Mixed hyperlipidemia E78.2 272.2 LIPID PANEL   2. Essential hypertension A53 541.7 METABOLIC PANEL, COMPREHENSIVE      CBC WITH AUTOMATED DIFF   3. Chronic fatigue R53.82 780.79 TSH RFX ON ABNORMAL TO FREE T4      CORTISOL, AM   4. Fibromyalgia M79.7 729.1    5. Moderate major depression (HCC) F32.1 296.22    6. Lymphedema of both lower extremities I89.0 457.1    7. Cervical radiculopathy M54.12 723.4 gabapentin (NEURONTIN) 300 mg capsule   8. Numbness of feet R20.0 782.0 REFERRAL TO PODIATRY   9. Screening for colon cancer Z12.11 V76.51 OCCULT BLOOD IMMUNOASSAY,DIAGNOSTIC   10. Screening for breast cancer Z12.31 V76.10    11. Vitamin D deficiency E55.9 268.9 VITAMIN D, 25 HYDROXY   12. IGT (impaired glucose tolerance) R73.02 790.22 HEMOGLOBIN A1C WITH EAG       Diagnoses and all orders for this visit:    1. Mixed hyperlipidemia  -     LIPID PANEL; Future    2. Essential hypertension  Well-controlled. Continue benazepril 40 mg daily.  -     METABOLIC PANEL, COMPREHENSIVE; Future  -     CBC WITH AUTOMATED DIFF; Future    3. Chronic fatigue  -     TSH RFX ON ABNORMAL TO FREE T4; Future  -     CORTISOL, AM; Future    4.  Fibromyalgia  This is a chronic problem that is not changed.  Per review of available records and patients , there are not sign of overuse, misuse, diversion, or concerning side effects. Today we reviewed: the goals of treatment (improve functionality, quality of life, and pain)  The following changes were made to the patients current treatment plan: nothing, Tramadol continued, did not need refill today. 5. Moderate major depression (HCC)  Controlled on Prozac 40 mg daily. 6. Lymphedema of both lower extremities  Recommended she have a representative from the company that supplied her the pump come to her home to show her how to use it. She has the name and phone number of someone who called her weeks ago for this purpose. 7. Cervical radiculopathy  -     Start gabapentin (NEURONTIN) 300 mg capsule; Take 1 Cap by mouth nightly. Max Daily Amount: 300 mg. Dx: Cervical radiculopathy (M54.12)    8. Numbness of feet  -     REFERRAL TO PODIATRY    9. Screening for colon cancer  -     OCCULT BLOOD IMMUNOASSAY,DIAGNOSTIC; Future    10. Screening for breast cancer  Instructed her to schedule mammogram.    11. Vitamin D deficiency  -     VITAMIN D, 25 HYDROXY; Future    12. IGT (impaired glucose tolerance)  -     HEMOGLOBIN A1C WITH EAG; Future    Greater than 40 mins direct face-to-face time spent with patient. Greater than 50% of time spent on counseling and coordination of care. Follow-up and Dispositions    · Return in about 3 months (around 11/9/2019), or if symptoms worsen or fail to improve, for Chronic pain, HTN; return to clinic in 1-2 weeks for cortisol and fasting labs. I have discussed the diagnosis with the patient and the intended plan as seen in the above orders. Patient is in agreement. The patient has received an after-visit summary and questions were answered concerning future plans. I have discussed medication side effects and warnings with the patient as well.

## 2019-08-09 NOTE — PROGRESS NOTES
Chasidy Diaz is 66 y.o. female    Chief Complaint   Patient presents with    Fibromyalgia     3 month follow up    Pain (Chronic)     3 month follow up       Visit Vitals  /84 (BP 1 Location: Right arm, BP Patient Position: Sitting)   Pulse 90   Temp 98.2 °F (36.8 °C) (Oral)   Resp 18   Ht (P) 5' 4\" (1.626 m)   Wt 309 lb 12.8 oz (140.5 kg)   SpO2 95%   BMI (P) 53.18 kg/m²         1. Have you been to the ER, urgent care clinic or hospitalized since your last visit? No     2. Have you seen or consulted any other health care providers outside of the [address] [place] [address] since your last visit? Include any pap smears or colon screening. No            3 most recent PHQ Screens 8/9/2019   Little interest or pleasure in doing things Several days   Feeling down, depressed, irritable, or hopeless Several days   Total Score PHQ 2 2   Trouble falling or staying asleep, or sleeping too much -   Feeling tired or having little energy -   Poor appetite, weight loss, or overeating -   Feeling bad about yourself - or that you are a failure or have let yourself or your family down -   Trouble concentrating on things such as school, work, reading, or watching TV -   Moving or speaking so slowly that other people could have noticed; or the opposite being so fidgety that others notice -   Thoughts of being better off dead, or hurting yourself in some way -   PHQ 9 Score -   How difficult have these problems made it for you to do your work, take care of your home and get along with others -        Fall Risk Assessment, last 12 mths 8/9/2019   Able to walk? Yes   Fall in past 12 months? Yes   Fall with injury? No        Abuse Screening Questionnaire 8/9/2019   Do you ever feel afraid of your partner? N   Are you in a relationship with someone who physically or mentally threatens you? N   Is it safe for you to go home?  Y        ADL Assessment 8/9/2019   Feeding yourself No Help Needed   Getting from bed to chair No Help Needed   Getting dressed No Help Needed   Bathing or showering No Help Needed   Walk across the room (includes cane/walker) No Help Needed   Using the telphone No Help Needed   Taking your medications No Help Needed   Preparing meals No Help Needed   Managing money (expenses/bills) No Help Needed   Moderately strenuous housework (laundry) No Help Needed   Shopping for personal items (toiletries/medicines) No Help Needed   Shopping for groceries No Help Needed   Driving No Help Needed   Climbing a flight of stairs No Help Needed   Getting to places beyond walking distances No Help Needed

## 2019-08-13 PROBLEM — G47.62 NOCTURNAL LEG CRAMPS: Status: ACTIVE | Noted: 2019-08-13

## 2019-10-04 ENCOUNTER — APPOINTMENT (OUTPATIENT)
Dept: PHYSICAL THERAPY | Age: 59
End: 2019-10-04

## 2019-10-15 DIAGNOSIS — M54.12 CERVICAL RADICULOPATHY: ICD-10-CM

## 2019-10-15 RX ORDER — GABAPENTIN 300 MG/1
CAPSULE ORAL
Qty: 30 CAP | Refills: 0 | OUTPATIENT
Start: 2019-10-15 | End: 2019-11-18 | Stop reason: SDUPTHER

## 2019-11-08 ENCOUNTER — OFFICE VISIT (OUTPATIENT)
Dept: INTERNAL MEDICINE CLINIC | Facility: CLINIC | Age: 59
End: 2019-11-08

## 2019-11-08 VITALS
RESPIRATION RATE: 18 BRPM | OXYGEN SATURATION: 95 % | BODY MASS INDEX: 50.02 KG/M2 | HEIGHT: 64 IN | WEIGHT: 293 LBS | HEART RATE: 91 BPM | SYSTOLIC BLOOD PRESSURE: 127 MMHG | DIASTOLIC BLOOD PRESSURE: 78 MMHG | TEMPERATURE: 98.9 F

## 2019-11-08 DIAGNOSIS — Z12.39 SCREENING FOR BREAST CANCER: ICD-10-CM

## 2019-11-08 DIAGNOSIS — E78.2 MIXED HYPERLIPIDEMIA: ICD-10-CM

## 2019-11-08 DIAGNOSIS — G89.29 CHRONIC LEFT-SIDED LOW BACK PAIN WITH LEFT-SIDED SCIATICA: ICD-10-CM

## 2019-11-08 DIAGNOSIS — E55.9 VITAMIN D DEFICIENCY: ICD-10-CM

## 2019-11-08 DIAGNOSIS — E66.01 MORBID OBESITY WITH BMI OF 50.0-59.9, ADULT (HCC): ICD-10-CM

## 2019-11-08 DIAGNOSIS — M16.12 PRIMARY OSTEOARTHRITIS OF LEFT HIP: ICD-10-CM

## 2019-11-08 DIAGNOSIS — Z79.899 HIGH RISK MEDICATION USE: ICD-10-CM

## 2019-11-08 DIAGNOSIS — I89.0 LYMPHEDEMA OF BOTH LOWER EXTREMITIES: ICD-10-CM

## 2019-11-08 DIAGNOSIS — R53.82 CHRONIC FATIGUE: ICD-10-CM

## 2019-11-08 DIAGNOSIS — M79.7 FIBROMYALGIA: ICD-10-CM

## 2019-11-08 DIAGNOSIS — I10 ESSENTIAL HYPERTENSION: ICD-10-CM

## 2019-11-08 DIAGNOSIS — I10 ESSENTIAL HYPERTENSION: Primary | ICD-10-CM

## 2019-11-08 DIAGNOSIS — F32.1 MODERATE MAJOR DEPRESSION (HCC): ICD-10-CM

## 2019-11-08 DIAGNOSIS — M54.42 CHRONIC LEFT-SIDED LOW BACK PAIN WITH LEFT-SIDED SCIATICA: ICD-10-CM

## 2019-11-08 DIAGNOSIS — M54.12 CERVICAL RADICULOPATHY: ICD-10-CM

## 2019-11-08 DIAGNOSIS — R73.02 IGT (IMPAIRED GLUCOSE TOLERANCE): ICD-10-CM

## 2019-11-08 RX ORDER — LIDOCAINE 50 MG/G
PATCH TOPICAL
Qty: 30 EACH | Refills: 1 | Status: SHIPPED | OUTPATIENT
Start: 2019-11-08 | End: 2020-07-27 | Stop reason: SDUPTHER

## 2019-11-08 RX ORDER — LIDOCAINE 50 MG/G
PATCH TOPICAL
Qty: 1 EACH | Refills: 0 | Status: SHIPPED | OUTPATIENT
Start: 2019-11-08 | End: 2019-11-08

## 2019-11-08 RX ORDER — TRAMADOL HYDROCHLORIDE 50 MG/1
50 TABLET ORAL
Qty: 45 TAB | Refills: 0 | Status: SHIPPED | OUTPATIENT
Start: 2019-11-08 | End: 2020-02-07 | Stop reason: SDUPTHER

## 2019-11-08 NOTE — PROGRESS NOTES
CC:   Chief Complaint   Patient presents with    Pain (Chronic)     left hip/lower back    Hypertension       HISTORY OF PRESENT ILLNESS  Savanna Byrnes is a 64 y.o. female. Presents for 3 month follow up evaluation.  She has HTN, depression with anxiety, fibromyalgia, chronic pain, osteoarthritis at lumbar spine, morbid obesity, lymphedema, and idiopathic polyneuropathy.  She complains of increased numbness and tingling at right hand due to CTS and possible cervical radiculopathy. Continues to have chronic left hip pain.  Wears Depends for urinary incontinence. Had bump appear on left side of neck that improved with tea tree oil. Presents for face to face appointment for the purpose of discussing her mobility needs.       She is seeking a manual wheelchair because she is having increasing problems with mobility due to chronic low-back pain due to lumbar spondylosis, ostearthritis of hips and knees, morbid obesity, and chronic lymphedema.  Her condition has progressed over time.       -  Her mobility limitations significantly impair her ability to participate in mobility-related activities of daily living (MRADL's) such as toileting, dressing, grooming, and bathing.        -  The mobility limitation cannot be sufficiently resolved by use of a fitted cane or walker.     -  The patient's home provides adequate access between rooms, maneuvering between rooms, maneuvering space for the use of the wheelchair.     -  The use of a manual wheelchair will significantly improve the patient's ability to participate in MRADL's and the patient will use it on a regular basis.    -  The patient has not expressed an unwillingness to use the wheelchair.      -   Patient needs a heavy duty manual wheelchair because she weighs more than 250 lbs.      -  The patient needs elevating leg rests due to lower extremity edema.     -  The patient also needs a seat cushion due to daily use of wheelchair.       Chronic Pain Review  Zhao Schmitt RTC today to follow up on chronic pain.  We discussed her osteoarthritis, fibromyalgia, radiculopathy and spondylolisthesis that is affecting her back, arms, hips (left > right) and legs.  Significant changes since last visit: none.  She is  able to do her normal daily activities.  She reports the following adverse side effects: none.  Ambulates with a cane.  Sleeps in a recliner.  Pain worsens with prolonged standing and prolonged sitting.  Takes Tramadol 50 mg 1 tab once daily as needed for pain.  Lyrica did not help her symptoms.      Least pain over the last week has been 7/10. Worst pain over the last week has been 10/10. Opioid Risk Tool Reviewed: YES  Aberrant behaviors: None.    Urine Drug Screen: will check today  Controlled substance agreement on file: YES.      reviewed:yes  Pill count is consistent with her prescription: yes  Concomitant use of a benzodiazepine: no  Active daily MME is 15 mg. Naloxone prescription not warranted.       Health Maintenance  Flu vaccine: declined  Pneumonia vaccine: declined  Tetanus vaccine: declined                                  ROS  A complete review of systems was performed and is negative except for those mentioned in the HPI.      Patient Active Problem List   Diagnosis Code    HTN (hypertension) I10    Numbness of feet R20.0    Osteopenia M85.80    Fibromyalgia M79.7    Left sided sciatica M54.32    Generalized anxiety disorder F41.1    Lumbar herniated disc M51.26    Primary osteoarthritis of left hip M16.12    Primary osteoarthritis of both knees M17.0    Degenerative lumbar disc M51.36    Morbid obesity with BMI of 50.0-59.9, adult ([facility]) E66.01, Z68.43    High risk medication use Z79.899    Moderate major depression ([facility]) F32.1    Lymphedema I89.0    Chronic venous hypertension (idiopathic) with ulcer and inflammation of bilateral lower extremity (CODE) ([facility]) I87.333    Cellulitis of left lower extremity without foot L03. 116    Cellulitis of right lower extremity without foot L03.115    Nocturnal leg cramps G47.62     Past Medical History:   Diagnosis Date    Depression with anxiety     Fibromyalgia     Hypertension     Morbid obesity ([address]     Osteoarthritis of knee 2016    R knee X-ray 7/5/16 ([facility]) mod to severe tibiofemoral arthritis, mod patellofemoral arthritis    Osteoarthritis of left hip 2015    Osteopenia      No Known Allergies    Current Outpatient Medications   Medication Sig Dispense Refill    methyl salicylate/menthol (CECIL HERRERA EX) by Apply Externally route.  gabapentin (NEURONTIN) 300 mg capsule TAKE ONE CAPSULE BY MOUTH EVERY NIGHT AT BEDTIME 30 Cap 0    methocarbamol (ROBAXIN) 750 mg tablet Take 1 Tab by mouth three (3) times daily as needed (muscle spasm/tightness). 90 Tab 1    benazepril (LOTENSIN) 40 mg tablet Take 1 Tab by mouth daily. For blood pressure. 90 Tab 3    FLUoxetine (PROZAC) 40 mg capsule TAKE ONE CAPSULE BY MOUTH DAILY 30 Cap 5    cholecalciferol, VITAMIN D3, (VITAMIN D3) 5,000 unit tab tablet Take  by mouth daily.  traMADol (ULTRAM) 50 mg tablet Take 50 mg by mouth every six (6) hours as needed for Pain.  naproxen sodium (ALEVE) 220 mg cap Take  by mouth.  ascorbic acid, vitamin C, (VITAMIN C) 250 mg tablet Take 1,000 mg by mouth daily. PHYSICAL EXAM  Visit Vitals  /78 (BP 1 Location: Right arm, BP Patient Position: Sitting)   Pulse 91   Temp 98.9 °F (37.2 °C) (Oral)   Resp 18   Ht 5' 4\" (1.626 m)   Wt 303 lb (137.4 kg)   SpO2 95%   BMI 52.01 kg/m²       General: Morbidly obese, no distress. In wheelchair. HEENT:  Head normocephalic/atraumatic, no scleral icterus  Neck: Supple. No carotid bruits, JVD, lymphadenopathy, or thyromegaly. Lungs:  Clear to ausculation bilaterally. Good air movement. Heart:  Regular rate and rhythm, normal S1 and S2, no murmur, gallop, or rub  Extremities: No clubbing, cyanosis.  2+ pitting bilateral LE edema. Skin: Hyperpigmented Papule with central follicle at left side of neck. Neurological: Alert and oriented. MS 4/5 at LLE, 5/5/ at RLE, 5/5 at bilateral UE's. 1+ ankle DTR's, 0 knee DTR's. Psychiatric: Normal mood and affect. Behavior is normal        ASSESSMENT AND PLAN    ICD-10-CM ICD-9-CM    1. Essential hypertension I10 401.9    2. Mixed hyperlipidemia E78.2 272.2    3. Chronic left-sided low back pain with left-sided sciatica M54.42 724.2 traMADol (ULTRAM) 50 mg tablet    G89.29 724.3 lidocaine (LIDODERM) 5 %     338.29 DISCONTINUED: lidocaine (LIDODERM) 5 %   4. Primary osteoarthritis of left hip M16.12 715.15    5. Lymphedema of both lower extremities I89.0 457.1    6. Cervical radiculopathy M54.12 723.4    7. Fibromyalgia M79.7 729.1    8. Morbid obesity with BMI of 50.0-59.9, adult (HCC) E66.01 278.01     Z68.43 V85.43    9. Moderate major depression (HCC) F32.1 296.22    10. Vitamin D deficiency E55.9 268.9    11. Screening for breast cancer Z12.39 V76.10 [facility] BI SCREENING INCL CAD   15. High risk medication use Z79.899 V58.69 COMPLIANCE DRUG SCREEN/PRESCRIPTION MONITORING      9-DRUG SCREEN + OXY, UR     Diagnoses and all orders for this visit:    1. Essential hypertension  Well-controlled.  Continue benazepril 40 mg daily. 2. Mixed hyperlipidemia  Check non-fasting lipid panel today. 3. Chronic left-sided low back pain with left-sided sciatica  This is a chronic problem that is not changed. Per review of available records and patients , there are not sign of overuse, misuse, diversion, or concerning side effects. Today we reviewed: alternative treatment options including non-narcotic modalities  The following changes were made to the patients current treatment plan: nothing, medications refilled. -     Refill traMADol (ULTRAM) 50 mg tablet; Take 1 Tab by mouth every eight (8) hours as needed for Pain for up to 15 days.  Max Daily Amount: 150 mg.  -     Start lidocaine (LIDODERM) 5 %; Apply patch to the affected area for 12 hours a day and remove for 12 hours a day. Dx: M54.42, G89.29    4. Primary osteoarthritis of left hip  I have decided to prescribe a manual wheelchair for her. Shelley Rodriguez a manual wheelchair will significantly improve her ability to participate in ADLs, and the patient will use the wheelchair in the home. Manpreet Steele has not expressed an unwillingness to use a wheelchair in the home. 5. Lymphedema of both lower extremities  [facility]. 6. Cervical radiculopathy    7. Fibromyalgia    8. Morbid obesity with BMI of 50.0-59.9, adult ([facility] [address]    9. Moderate major depression (HCC)  Controlled on Prozac 40 mg daily. 10. Vitamin D deficiency    11. Screening for breast cancer  -     [facility] MAMMO BI SCREENING INCL CAD; Future    12. High risk medication use  -     COMPLIANCE DRUG SCREEN/PRESCRIPTION MONITORING  -     9-DRUG SCREEN + OXY, UR      Follow-up and Dispositions    · Return in about 3 months (around 2/8/2020), or if symptoms worsen or fail to improve, for Chronic pain mgt. I have discussed the diagnosis with the patient and the intended plan as seen in the above orders. Patient is in agreement. The patient has received an after-visit summary and questions were answered concerning future plans. I have discussed medication side effects and warnings with the patient as well.

## 2019-11-08 NOTE — PROGRESS NOTES
Laila Mai  Identified pt with two pt identifiers(name and ). Chief Complaint   Patient presents with    Pain (Chronic)     left hip/lower back    Hypertension       1. Have you been to the ER, urgent care clinic since your last visit? Hospitalized since your last visit? NO    2. Have you seen or consulted any other health care providers outside of the [address] [place] [address] since your last visit? Include any pap smears or colon screening. Dr Preeti Oliveira)    Today's provider has been notified of reason for visit, vitals and flowsheets obtained on patients.      Patient received paperwork for advance directive during previous visit but has not completed at this time     Reviewed record In preparation for visit, huddled with provider and have obtained necessary documentation      Health Maintenance Due   Topic    Shingrix Vaccine Age 50> (1 of 2)    BREAST CANCER SCRN MAMMOGRAM     FOBT Q 1 YEAR, 18+     Influenza Age 5 to Adult        Wt Readings from Last 3 Encounters:   19 303 lb (137.4 kg)   19 309 lb 12.8 oz (140.5 kg)   19 309 lb (140.2 kg)     Temp Readings from Last 3 Encounters:   19 98.9 °F (37.2 °C) (Oral)   19 98.2 °F (36.8 °C) (Oral)   05/10/19 98.9 °F (37.2 °C) (Oral)     BP Readings from Last 3 Encounters:   19 127/78   19 146/84   19 140/90     Pulse Readings from Last 3 Encounters:   19 91   19 90   19 90     Vitals:    19 1423   BP: 127/78   Pulse: 91   Resp: 18   Temp: 98.9 °F (37.2 °C)   TempSrc: Oral   SpO2: 95%   Weight: 303 lb (137.4 kg)   Height: 5' 4\" (1.626 m)   PainSc:   9   PainLoc: Back         Learning Assessment:  :     Learning Assessment 2015 3/26/2014   PRIMARY LEARNER Patient Patient   HIGHEST LEVEL OF EDUCATION - PRIMARY LEARNER  GRADUATED HIGH SCHOOL OR GED GRADUATED HIGH SCHOOL OR GED   BARRIERS PRIMARY LEARNER NONE NONE   CO-LEARNER CAREGIVER No No   PRIMARY LANGUAGE ENGLISH ENGLISH   LEARNER PREFERENCE PRIMARY LISTENING VIDEOS   ANSWERED BY patient patient   RELATIONSHIP SELF SELF       Depression Screening:  :     3 most recent PHQ Screens 8/9/2019   Little interest or pleasure in doing things Several days   Feeling down, depressed, irritable, or hopeless Several days   Total Score PHQ 2 2   Trouble falling or staying asleep, or sleeping too much -   Feeling tired or having little energy -   Poor appetite, weight loss, or overeating -   Feeling bad about yourself - or that you are a failure or have let yourself or your family down -   Trouble concentrating on things such as school, work, reading, or watching TV -   Moving or speaking so slowly that other people could have noticed; or the opposite being so fidgety that others notice -   Thoughts of being better off dead, or hurting yourself in some way -   PHQ 9 Score -   How difficult have these problems made it for you to do your work, take care of your home and get along with others -       Fall Risk Assessment:  :     Fall Risk Assessment, last 12 mths 8/9/2019   Able to walk? Yes   Fall in past 12 months? Yes   Fall with injury? No       Abuse Screening:  :     Abuse Screening Questionnaire 8/9/2019   Do you ever feel afraid of your partner? N   Are you in a relationship with someone who physically or mentally threatens you? N   Is it safe for you to go home?  Y       ADL Screening:  :     ADL Assessment 8/9/2019   Feeding yourself No Help Needed   Getting from bed to chair No Help Needed   Getting dressed No Help Needed   Bathing or showering No Help Needed   Walk across the room (includes cane/walker) No Help Needed   Using the telphone No Help Needed   Taking your medications No Help Needed   Preparing meals No Help Needed   Managing money (expenses/bills) No Help Needed   Moderately strenuous housework (laundry) No Help Needed   Shopping for personal items (toiletries/medicines) No Help Needed   Shopping for groceries No Help Needed   Driving No Help Needed   Climbing a flight of stairs No Help Needed   Getting to places beyond walking distances No Help Needed                 Medication reconciliation up to date and corrected with patient at this time.

## 2019-11-09 LAB
25(OH)D3+25(OH)D2 SERPL-MCNC: 21 NG/ML (ref 30–100)
ALBUMIN SERPL-MCNC: 4.4 G/DL (ref 3.5–5.5)
ALBUMIN/GLOB SERPL: 1.8 {RATIO} (ref 1.2–2.2)
ALP SERPL-CCNC: 90 IU/L (ref 39–117)
ALT SERPL-CCNC: 9 IU/L (ref 0–32)
AST SERPL-CCNC: 14 IU/L (ref 0–40)
BASOPHILS # BLD AUTO: 0 X10E3/UL (ref 0–0.2)
BASOPHILS NFR BLD AUTO: 1 %
BILIRUB SERPL-MCNC: 0.3 MG/DL (ref 0–1.2)
BUN SERPL-MCNC: 13 MG/DL (ref 6–24)
BUN/CREAT SERPL: 19 (ref 9–23)
CALCIUM SERPL-MCNC: 9.2 MG/DL (ref 8.7–10.2)
CHLORIDE SERPL-SCNC: 103 MMOL/L (ref 96–106)
CHOLEST SERPL-MCNC: 216 MG/DL (ref 100–199)
CO2 SERPL-SCNC: 23 MMOL/L (ref 20–29)
CREAT SERPL-MCNC: 0.67 MG/DL (ref 0.57–1)
EOSINOPHIL # BLD AUTO: 0.1 X10E3/UL (ref 0–0.4)
EOSINOPHIL NFR BLD AUTO: 1 %
ERYTHROCYTE [DISTWIDTH] IN BLOOD BY AUTOMATED COUNT: 14.1 % (ref 12.3–15.4)
EST. AVERAGE GLUCOSE BLD GHB EST-MCNC: 117 MG/DL
GLOBULIN SER CALC-MCNC: 2.4 G/DL (ref 1.5–4.5)
GLUCOSE SERPL-MCNC: 85 MG/DL (ref 65–99)
HBA1C MFR BLD: 5.7 % (ref 4.8–5.6)
HCT VFR BLD AUTO: 42 % (ref 34–46.6)
HDLC SERPL-MCNC: 49 MG/DL
HGB BLD-MCNC: 13.7 G/DL (ref 11.1–15.9)
IMM GRANULOCYTES # BLD AUTO: 0 X10E3/UL (ref 0–0.1)
IMM GRANULOCYTES NFR BLD AUTO: 0 %
LDLC SERPL CALC-MCNC: 137 MG/DL (ref 0–99)
LYMPHOCYTES # BLD AUTO: 1.5 X10E3/UL (ref 0.7–3.1)
LYMPHOCYTES NFR BLD AUTO: 24 %
MCH RBC QN AUTO: 27.5 PG (ref 26.6–33)
MCHC RBC AUTO-ENTMCNC: 32.6 G/DL (ref 31.5–35.7)
MCV RBC AUTO: 84 FL (ref 79–97)
MONOCYTES # BLD AUTO: 0.5 X10E3/UL (ref 0.1–0.9)
MONOCYTES NFR BLD AUTO: 8 %
NEUTROPHILS # BLD AUTO: 4.3 X10E3/UL (ref 1.4–7)
NEUTROPHILS NFR BLD AUTO: 66 %
PLATELET # BLD AUTO: 327 X10E3/UL (ref 150–450)
POTASSIUM SERPL-SCNC: 4.6 MMOL/L (ref 3.5–5.2)
PROT SERPL-MCNC: 6.8 G/DL (ref 6–8.5)
RBC # BLD AUTO: 4.98 X10E6/UL (ref 3.77–5.28)
SODIUM SERPL-SCNC: 139 MMOL/L (ref 134–144)
TRIGL SERPL-MCNC: 151 MG/DL (ref 0–149)
TSH SERPL DL<=0.005 MIU/L-ACNC: 1.35 UIU/ML (ref 0.45–4.5)
VLDLC SERPL CALC-MCNC: 30 MG/DL (ref 5–40)
WBC # BLD AUTO: 6.5 X10E3/UL (ref 3.4–10.8)

## 2019-11-11 NOTE — PROGRESS NOTES
Message sent to patient by My Chart:  Your labs showed normal kidney and liver tests, blood counts, and thyroid. Your diabetes screening test showed mild prediabetes. Your total cholesterol was high at 216 (normal is less than 200) and your LDL, or bad cholesterol, was high at 137 (normal is less than 100). Lastly, your vitamin D level was low. Make sure you are taking vitamin D 5000 units once daily.     Dr. Nimco Gregory

## 2019-11-13 DIAGNOSIS — G56.03 BILATERAL CARPAL TUNNEL SYNDROME: Primary | ICD-10-CM

## 2019-11-14 LAB — DRUGS UR: NORMAL

## 2019-11-15 NOTE — PROGRESS NOTES
Message sent to patient by My Chart:  Your urine drug screen returned showing you are taking Tramadol and gabapentin as expected.     Dr. Su Bertrand

## 2019-11-18 DIAGNOSIS — M54.12 CERVICAL RADICULOPATHY: ICD-10-CM

## 2019-11-19 RX ORDER — GABAPENTIN 300 MG/1
300 CAPSULE ORAL
Qty: 30 CAP | Refills: 0 | Status: SHIPPED | OUTPATIENT
Start: 2019-11-19 | End: 2020-03-04 | Stop reason: SDUPTHER

## 2019-11-19 NOTE — TELEPHONE ENCOUNTER
REFILL     PCP: Gayla Beck MD     Last appt: 11/8/2019   Future Appointments   Date Time Provider Keke Jerez   2/7/2020  2:20 PM Gayla Beck  [facility]        Requested Prescriptions     Pending Prescriptions Disp Refills    gabapentin (NEURONTIN) 300 mg capsule 30 Cap 0

## 2019-12-03 ENCOUNTER — OFFICE VISIT (OUTPATIENT)
Dept: NEUROLOGY | Age: 59
End: 2019-12-03

## 2019-12-03 VITALS
SYSTOLIC BLOOD PRESSURE: 147 MMHG | HEIGHT: 64 IN | HEART RATE: 89 BPM | DIASTOLIC BLOOD PRESSURE: 81 MMHG | WEIGHT: 293 LBS | OXYGEN SATURATION: 96 % | BODY MASS INDEX: 50.02 KG/M2

## 2019-12-03 DIAGNOSIS — G56.02 CARPAL TUNNEL SYNDROME OF LEFT WRIST: ICD-10-CM

## 2019-12-03 DIAGNOSIS — G56.01 CARPAL TUNNEL SYNDROME OF RIGHT WRIST: Primary | ICD-10-CM

## 2019-12-03 NOTE — PROCEDURES
ELECTRODIAGNOSTIC REPORT      Test Date:  12/3/2019    Patient: Ernestina Senior : 1960 Physician: Nando Wade   ID#: [id number] SEX: Female Ref. Phys: Nando Wade     Patient History / Exam:  Worsening hand numbness/tingling. Reassess for carpal tunnel syndrome    EMG & NCV Findings:  Sensory and motor nerve conduction studies ( as indicated in the tables) were within reference of normal except for prolonged R>L median sensory peak and right distal motor latencies with decrease right sensory nerve action potential and compound muscle action potential amplitudes with slowing of the motor conduction velocity. Disposable concentric needle EMG (as indicated in the table) was normal except for significant denervation and neuropathic recruitment changes right APB muscle. Impression: This study is abnormal. There is electrodiagnostic evidence of a severe right and mild left distal median sensorimotor neuropathy at the wrist as seen in carpal tunnel syndrome. There is no evidence of a generalized neuropathy, myopathy or significant cervical radiculopathy at this time.  Patient referred to hand specialist.    Nando Wade MD    Nerve Conduction Studies  Anti Sensory Summary Table     Stim Site NR Onset (ms) Peak (ms) O-P Amp (µV) Norm Peak (ms) Norm O-P Amp Site1 Site2 Dist (cm) Norm Ronny (m/s)   Left Median Anti Sensory (2nd Digit)  33.1°C   Wrist    3.5 4.3 14.5 <4 >11 Wrist 2nd Digit 14.0    Right Median Anti Sensory (2nd Digit)  28.3°C   Wrist    5.0 5.8 2.9 <4 >11 Wrist 2nd Digit 14.0    Left Radial Anti Sensory (Base 1st Digit)  33.3°C   Wrist    1.5 2.0 27.7 <2.8 7 Wrist Base 1st Digit 10.0    Right Radial Anti Sensory (Base 1st Digit)   Wrist    1.7 2.2 25.2 <2.8 7 Wrist Base 1st Digit 10.0    Left Ulnar Anti Sensory (5th Digit)  33.2°C   Wrist    4.1 3.3 21.6 <4.0 >10 Wrist 5th Digit 14.0    Right Ulnar Anti Sensory (5th Digit)   Wrist    2.6 3.2 14.0 <4.0 >10 Wrist 5th Digit 14.0 Motor Summary Table     Stim Site NR Onset (ms) Norm Onset (ms) O-P Amp (mV) Norm O-P Amp P-T Amp (mV) Site1 Site2 Dist (cm) Ronny (m/s)   Left Median Motor (Abd Poll Brev)  33.4°C   Wrist    4.5 <4.5 7.4 >4.1  Wrist Abd Poll Brev 8.0 18   Elbow    7.8  6.2   Elbow Wrist 20.0 61   Right Median Motor (Abd Poll Brev)  33.2°C   Wrist    7.7 <4.5 2.1 >4.1  Wrist Abd Poll Brev 8.0 10   Elbow    12.0  1.9   Elbow Wrist 20.0 47   Right Ulnar Motor (Abd Dig Minimi)  33.3°C   Wrist    2.7 <3.1 8.5 >7.0  Wrist Abd Dig Minimi 8.0 30   B Elbow    6.0  8.8   B Elbow Wrist 22.0 67   A Elbow    7.2  7.7   A Elbow Wrist 32.0 71       EMG     Side Muscle Nerve Root Ins Act Fibs Psw Recrt Duration Amp Poly Comment   Right Deltoid Axillary C5-6 Nml Nml Nml Nml Nml Nml Nml    Right Triceps Radial C6-7-8 Nml Nml Nml Nml Nml Nml Nml    Right Biceps Musculocut C5-6 Nml Nml Nml Nml Nml Nml Nml    Right FlexCarRad Median C6-7 Nml Nml Nml Nml Nml Nml Nml    Right Abd Poll Brev Median C8-T1 Incr 2+ 2+ Reduced Incr Incr 3+      Waveforms:

## 2019-12-03 NOTE — PATIENT INSTRUCTIONS
A Healthy Lifestyle: Care Instructions  Your Care Instructions    A healthy lifestyle can help you feel good, stay at a healthy weight, and have plenty of energy for both work and play. A healthy lifestyle is something you can share with your whole family. A healthy lifestyle also can lower your risk for serious health problems, such as high blood pressure, heart disease, and diabetes. You can follow a few steps listed below to improve your health and the health of your family. Follow-up care is a key part of your treatment and safety. Be sure to make and go to all appointments, and call your doctor if you are having problems. It's also a good idea to know your test results and keep a list of the medicines you take. How can you care for yourself at home? · Do not eat too much sugar, fat, or fast foods. You can still have dessert and treats now and then. The goal is moderation. · Start small to improve your eating habits. Pay attention to portion sizes, drink less juice and soda pop, and eat more fruits and vegetables. ? Eat a healthy amount of food. A 3-ounce serving of meat, for example, is about the size of a deck of cards. Fill the rest of your plate with vegetables and whole grains. ? Limit the amount of soda and sports drinks you have every day. Drink more water when you are thirsty. ? Eat at least 5 servings of fruits and vegetables every day. It may seem like a lot, but it is not hard to reach this goal. A serving or helping is 1 piece of fruit, 1 cup of vegetables, or 2 cups of leafy, raw vegetables. Have an apple or some carrot sticks as an afternoon snack instead of a candy bar. Try to have fruits and/or vegetables at every meal.  · Make exercise part of your daily routine. You may want to start with simple activities, such as walking, bicycling, or slow swimming. Try to be active 30 to 60 minutes every day. You do not need to do all 30 to 60 minutes all at once.  For example, you can exercise 3 times a day for 10 or 20 minutes. Moderate exercise is safe for most people, but it is always a good idea to talk to your doctor before starting an exercise program.  · Keep moving. [employer] Rm the lawn, work in the garden, or [employer]. Take the stairs instead of the elevator at work. · If you smoke, quit. People who smoke have an increased risk for heart attack, stroke, cancer, and other lung illnesses. Quitting is hard, but there are ways to boost your chance of quitting tobacco for good. ? Use nicotine gum, patches, or lozenges. ? Ask your doctor about stop-smoking programs and medicines. ? Keep trying. In addition to reducing your risk of diseases in the future, you will notice some benefits soon after you stop using tobacco. If you have shortness of breath or asthma symptoms, they will likely get better within a few weeks after you quit. · Limit how much alcohol you drink. Moderate amounts of alcohol (up to 2 drinks a day for men, 1 drink a day for women) are okay. But drinking too much can lead to liver problems, high blood pressure, and other health problems. Family health  If you have a family, there are many things you can do together to improve your health. · Eat meals together as a family as often as possible. · Eat healthy foods. This includes fruits, vegetables, lean meats and dairy, and whole grains. · Include your family in your fitness plan. Most people think of activities such as jogging or tennis as the way to fitness, but there are many ways you and your family can be more active. Anything that makes you breathe hard and gets your heart pumping is exercise. Here are some tips:  ? Walk to do errands or to take your child to school or the bus.  ? Go for a family bike ride after dinner instead of watching TV. Where can you learn more? Go to http://jac-jeff.info/. Enter R801 in the search box to learn more about \"A Healthy Lifestyle: Care Instructions. \"  Current as of: May 28, 2019  Content Version: 12.2  © 5001-0025 [employer]. Care instructions adapted under license by [employer] (which disclaims liability or warranty for this information). If you have questions about a medical condition or this instruction, always ask your healthcare professional. [address] any warranty or liability for your use of this information. Office Policies    o Phone calls/patient messages:  Please allow up to 24 hours for someone in the office to contact you about your call or message. Be mindful your provider may be out of the office or your message may require further review. We encourage you to use [email or web address] for your messages as this is a faster, more efficient way to communicate with our office    o Medication Refills:  Prescription medications require up to 48 business hours to process. We encourage you to use [email or web address] for your refills. For controlled medications: Please allow up to 72 business hours to process. Certain medications may require you to  a written prescription at our office. NO narcotic/controlled medications will be prescribed after 4pm Monday through Friday or on weekends    o Form/Paperwork Completion:  We ask that you allow 7-14 business days. You may also download your forms to [email or web address] to have your doctor print off.

## 2019-12-09 ENCOUNTER — DOCUMENTATION ONLY (OUTPATIENT)
Dept: NEUROLOGY | Age: 59
End: 2019-12-09

## 2019-12-22 DIAGNOSIS — I10 ESSENTIAL HYPERTENSION: ICD-10-CM

## 2019-12-23 RX ORDER — BENAZEPRIL HYDROCHLORIDE 40 MG/1
40 TABLET ORAL DAILY
Qty: 90 TAB | Refills: 3 | Status: SHIPPED | OUTPATIENT
Start: 2019-12-23 | End: 2020-03-30 | Stop reason: SDUPTHER

## 2020-01-03 DIAGNOSIS — M17.0 PRIMARY OSTEOARTHRITIS OF BOTH KNEES: Primary | ICD-10-CM

## 2020-01-15 ENCOUNTER — TELEPHONE (OUTPATIENT)
Dept: INTERNAL MEDICINE CLINIC | Facility: CLINIC | Age: 60
End: 2020-01-15

## 2020-01-15 NOTE — TELEPHONE ENCOUNTER
Mehdi Adams from [facility] called to state that they received an order for this pt's wheelchair. They are no longer providing wheelchairs and would like to have a call back to discuss where else this order may be sent. Please call back at [phone].

## 2020-01-16 ENCOUNTER — TELEPHONE (OUTPATIENT)
Dept: INTERNAL MEDICINE CLINIC | Facility: CLINIC | Age: 60
End: 2020-01-16

## 2020-01-16 DIAGNOSIS — I89.0 LYMPHEDEMA OF BOTH LOWER EXTREMITIES: Primary | ICD-10-CM

## 2020-01-16 DIAGNOSIS — I89.0 LYMPHEDEMA: ICD-10-CM

## 2020-01-16 NOTE — TELEPHONE ENCOUNTER
Danni Nguyen from the [facility] called to let you know that the pt was scheduled for a f/u with them in the fall. Pt was not able to come in due to mobility issues. Pt expressed interest in seeing a travel provider that could visit her @ home. They are requesting an order be sent to that new provider so that the pt can be seen in her home. His name is Brennan Mccarthy, phone # is [phone]. He would need an order from Dr. Nella Boggs to provide home lymphodema therapy. Please give Danni Nguyen a call back at [phone] with any questions.

## 2020-01-16 NOTE — TELEPHONE ENCOUNTER
Advised personnel at [facility] to call patient to find out her preference in company, if any for wheel chair order.

## 2020-01-17 ENCOUNTER — TELEPHONE (OUTPATIENT)
Dept: INTERNAL MEDICINE CLINIC | Facility: CLINIC | Age: 60
End: 2020-01-17

## 2020-01-17 NOTE — TELEPHONE ENCOUNTER
Attempted call to patient with no answer. Sent My Chart message regarding Home PT for lymphedema.  Faxed referral.

## 2020-02-07 ENCOUNTER — DOCUMENTATION ONLY (OUTPATIENT)
Dept: INTERNAL MEDICINE CLINIC | Facility: CLINIC | Age: 60
End: 2020-02-07

## 2020-02-07 ENCOUNTER — OFFICE VISIT (OUTPATIENT)
Dept: INTERNAL MEDICINE CLINIC | Facility: CLINIC | Age: 60
End: 2020-02-07

## 2020-02-07 ENCOUNTER — TELEPHONE (OUTPATIENT)
Dept: INTERNAL MEDICINE CLINIC | Facility: CLINIC | Age: 60
End: 2020-02-07

## 2020-02-07 VITALS
DIASTOLIC BLOOD PRESSURE: 79 MMHG | WEIGHT: 293 LBS | HEIGHT: 64 IN | HEART RATE: 83 BPM | BODY MASS INDEX: 50.02 KG/M2 | SYSTOLIC BLOOD PRESSURE: 122 MMHG | TEMPERATURE: 98.3 F | OXYGEN SATURATION: 95 % | RESPIRATION RATE: 20 BRPM

## 2020-02-07 DIAGNOSIS — I10 ESSENTIAL HYPERTENSION: Primary | ICD-10-CM

## 2020-02-07 DIAGNOSIS — F41.1 GENERALIZED ANXIETY DISORDER: ICD-10-CM

## 2020-02-07 DIAGNOSIS — I89.0 LYMPHEDEMA OF BOTH LOWER EXTREMITIES: ICD-10-CM

## 2020-02-07 DIAGNOSIS — M54.42 CHRONIC LEFT-SIDED LOW BACK PAIN WITH LEFT-SIDED SCIATICA: ICD-10-CM

## 2020-02-07 DIAGNOSIS — G89.29 CHRONIC LEFT-SIDED LOW BACK PAIN WITH LEFT-SIDED SCIATICA: ICD-10-CM

## 2020-02-07 DIAGNOSIS — F32.1 MODERATE MAJOR DEPRESSION (HCC): ICD-10-CM

## 2020-02-07 RX ORDER — TRAMADOL HYDROCHLORIDE 50 MG/1
50 TABLET ORAL
COMMUNITY
End: 2020-02-07 | Stop reason: SDUPTHER

## 2020-02-07 RX ORDER — TRAMADOL HYDROCHLORIDE 50 MG/1
50 TABLET ORAL
Qty: 45 TAB | Refills: 0 | Status: SHIPPED | OUTPATIENT
Start: 2020-02-07 | End: 2020-08-25 | Stop reason: SDUPTHER

## 2020-02-07 NOTE — PROGRESS NOTES
Bhavna Pastrana is a 64 y.o. female    Chief Complaint   Patient presents with    Pain (Chronic)     3 mth follow up    Hypertension     Visit Vitals  /79 (BP 1 Location: Left arm, BP Patient Position: Sitting)   Pulse 83   Temp 98.3 °F (36.8 °C) (Oral)   Resp 20   Ht 5' 4\" (1.626 m)   Wt 310 lb 12.8 oz (141 kg)   SpO2 95%   BMI 53.35 kg/m²       1. Have you been to the ER, urgent care clinic since your last visit? Hospitalized since your last visit? No    2. Have you seen or consulted any other health care providers outside of the [address] [place] [address] since your last visit? Include any pap smears or colon screening.  No

## 2020-02-07 NOTE — PATIENT INSTRUCTIONS
Lymphedema: Care Instructions  Your Care Instructions    Lymphedema is fluid that builds up in the arms or legs. It is often caused by surgery to remove lymph nodes during cancer treatment, especially breast cancer surgery, which can cause fluid to build up in the arm. It can happen after radiation treatment to an area that involves lymph nodes. It also can be caused by a fractured bone or surgery to fix a fracture. And some medicines also can cause lymphedema. Some people get it for unknown reasons. Normally, lymph nodes trap bacteria and other substances as fluid flows through them. Then, the white cells in the body's defense, or immune, system can destroy the substances. But if there are few or no lymph nodes--or if the lymph system in an arm or leg has been damaged--fluid can build up in the affected arm or leg. You can take simple steps at home to help treat or prevent fluid buildup. Treatment may include raising the arm or leg to let gravity drain the fluid. You also can wear compression stockings or sleeves. Follow-up care is a key part of your treatment and safety. Be sure to make and go to all appointments, and call your doctor if you are having problems. It's also a good idea to know your test results and keep a list of the medicines you take. How can you care for yourself at home? · Wear a compression stocking or sleeve as your doctor suggests. It can help keep fluid from pooling in an arm or leg. Wear it during air travel. · Prop up the swollen arm or leg on a pillow anytime you sit or lie down. Try to keep it above the level of your heart. This will help reduce swelling. · Avoid crossing your legs if your legs are swollen. · Get some exercise on most days of the week. Increase the intensity of exercise slowly. Water aerobics can help reduce swelling by helping fluid move around. Wear your compression stocking or sleeve during exercise, but not during water exercise.   · See a physical therapist. He or she can teach you how to do self-massage to help fluid move around. You also can learn what activities would be best for you. · Keep your feet clean and wear clean socks or stockings every day. Check your feet often for signs of infection, such as redness or heat. Do not walk barefoot. · If you have had lymph nodes removed from under your arm:  ? Do not have blood drawn from the arm on the side of the lymph node surgery. ? Do not allow a blood pressure cuff to be placed on that arm. If you are in the hospital, make sure your nurse and other hospital staff know of your condition. ? Wear gloves when gardening or doing other activities that may lead to cuts on your fingers or hands. · If you have had lymph nodes removed from your groin:  ? Bathe your feet daily in lukewarm, not hot, water. Use a mild soap that has a moisturizer, or use a moisturizer separately. ? Check your feet for blisters or cuts. ? Wear comfortable and supportive shoes that fit properly. ? Wear the correct size of panty hose and stockings. Avoid garters or knee-high or thigh-high stockings. · Ask your doctor how to treat any cuts, scratches, insect bites, or other injuries that may occur. · Use sunscreen and insect repellent when outdoors to protect your skin from sunburn and insect bites. · Wear medical alert jewelry that says you have lymphedema. You can buy these at most drugstores and on the Internet. When should you call for help? Call your doctor now or seek immediate medical care if:    · You have signs of infection, such as:  ? Increased pain, swelling, warmth, or redness. ? Red streaks leading from the area. ? Pus draining from the area. ? A fever.    Watch closely for changes in your health, and be sure to contact your doctor if:    · You have new or worse symptoms from lymphedema.     · You do not get better as expected. Where can you learn more?   Go to http://jac-jeff.info/. Enter V398 in the search box to learn more about \"Lymphedema: Care Instructions. \"  Current as of: December 19, 2018  Content Version: 12.2  © 9008-0410 [employer]. Care instructions adapted under license by [employer] (which disclaims liability or warranty for this information). If you have questions about a medical condition or this instruction, always ask your healthcare professional. [place] [address] any warranty or liability for your use of this information.

## 2020-02-07 NOTE — PROGRESS NOTES
CC:   Chief Complaint   Patient presents with    Pain (Chronic)     3 mth follow up    Hypertension       Debra Rodriguez is a 64 y.o. female. Presents for 3 month follow up evaluation.  She has HTN, depression with anxiety, fibromyalgia, chronic pain, osteoarthritis at lumbar spine, morbid obesity, lymphedema, and idiopathic polyneuropathy.  She complains of much fatigue, increased anxiety, and increased numbness and tingling at both hands (right > left) due to carpal tunnel syndrome (CTS). Had EMG/NCV on 12/3/19 that showed severe right and mild left CTS. Continues to have chronic left hip pain.       Also has chronic lymphedema. Was seen at [address] [place] [address] but had difficulty traveling to clinic. She lives outside of the travel zone for their home care program.  Has problems putting wraps on her legs. Brought in disability paperwork from her  to be completed. Severe chronic pain started in 2011. She is appealing not getting disability and has a hearing on 3/19/20. Chronic Pain Review  [facility] RTC today to follow up on chronic pain.  We discussed her osteoarthritis, fibromyalgia, radiculopathy and spondylolisthesis that is affecting her back, arms, hips (left > right) and legs.  Significant changes since last visit: none.  She is  able to do her normal daily activities.  She reports the following adverse side effects: none.  Received wheelchair a few days ago. Ambulates with a cane.  Sleeps in a recliner. Fiorella Duff worsens with standing or walking. Takes Tramadol 50 mg 1 tab BID about 3 times a week as needed for pain.  Lyrica did not help her symptoms.      Least pain over the last week has been 7/10. Worst pain over the last week has been 10/10. Opioid Risk Tool Reviewed: YES  Aberrant behaviors: None.    Urine Drug Screen: UTD  Controlled substance agreement on file: YES.      reviewed:yes  Pill count is consistent with her prescription: yes  Concomitant use of a benzodiazepine: no  Active daily MME is 15 mg. Naloxone prescription not warranted.       Health Maintenance  Flu vaccine: declined  Pneumonia vaccine: declined  Tetanus vaccine: declined                                   ROS  A complete review of systems was performed and is negative except for those mentioned in the HPI. Patient Active Problem List   Diagnosis Code    Essential hypertension I10    Numbness of feet R20.0    Osteopenia M85.80    Fibromyalgia M79.7    Left sided sciatica M54.32    Generalized anxiety disorder F41.1    Lumbar herniated disc M51.26    Primary osteoarthritis of left hip M16.12    Primary osteoarthritis of both knees M17.0    Degenerative lumbar disc M51.36    Morbid obesity with BMI of 50.0-59.9, adult ([facility] E66.01, Z68.43    High risk medication use Z79.899    Moderate major depression (HCC) F32.1    Lymphedema I89.0    Chronic venous hypertension (idiopathic) with ulcer and inflammation of bilateral lower extremity (CODE) ([facility] I87.333    Cellulitis of left lower extremity without foot L03. 116    Cellulitis of right lower extremity without foot L03.115    Nocturnal leg cramps G47.62    Cervical radiculopathy M54.12    Lymphedema of both lower extremities I89.0    Mixed hyperlipidemia E78.2    Vitamin D deficiency E55.9     Past Medical History:   Diagnosis Date    Depression with anxiety     Fibromyalgia     Hypertension     Morbid obesity ([facility]     Osteoarthritis of knee 2016    R knee X-ray 7/5/16 ([facility]) mod to severe tibiofemoral arthritis, mod patellofemoral arthritis    Osteoarthritis of left hip 2015    Osteopenia      No Known Allergies    Current Outpatient Medications   Medication Sig Dispense Refill    traMADol (ULTRAM) 50 mg tablet Take 50 mg by mouth every six (6) hours as needed for Pain.  benazepril (LOTENSIN) 40 mg tablet Take 1 Tab by mouth daily. For blood pressure.  90 Tab 3  gabapentin (NEURONTIN) 300 mg capsule Take 1 Cap by mouth nightly. Max Daily Amount: 300 mg. 30 Cap 0    methyl salicylate/menthol (CECIL HERRERA EX) by Apply Externally route.  lidocaine (LIDODERM) 5 % Apply patch to the affected area for 12 hours a day and remove for 12 hours a day. Dx: M54.42, G89.29 30 Each 1    methocarbamol (ROBAXIN) 750 mg tablet Take 1 Tab by mouth three (3) times daily as needed (muscle spasm/tightness). 90 Tab 1    FLUoxetine (PROZAC) 40 mg capsule TAKE ONE CAPSULE BY MOUTH DAILY 30 Cap 5    cholecalciferol, VITAMIN D3, (VITAMIN D3) 5,000 unit tab tablet Take  by mouth daily.  naproxen sodium (ALEVE) 220 mg cap Take  by mouth.  ascorbic acid, vitamin C, (VITAMIN C) 250 mg tablet Take 1,000 mg by mouth daily. PHYSICAL EXAM  Visit Vitals  /79 (BP 1 Location: Left arm, BP Patient Position: Sitting)   Pulse 83   Temp 98.3 °F (36.8 °C) (Oral)   Resp 20   Ht 5' 4\" (1.626 m)   Wt 310 lb 12.8 oz (141 kg)   SpO2 95%   BMI 53.35 kg/m²       General: Morbidly obese, no distress. In wheelchair. HEENT:  Head normocephalic/atraumatic, no scleral icterus  Neck: Supple. No carotid bruits, JVD, lymphadenopathy, or thyromegaly. Lungs:  Clear to ausculation bilaterally. Good air movement. Heart:  Regular rate and rhythm, normal S1 and S2, no murmur, gallop, or rub  Extremities: No clubbing, cyanosis. Legs in wrappings. Usually has 2+ pitting bilateral LE edema. Neurological: Alert and oriented. MS 4/5 at LLE, 5/5/ at RLE, 5/5 at bilateral UE's. 1+ ankle DTR's, 0 knee DTR's. Psychiatric: Normal mood and affect. Behavior is normal        ASSESSMENT AND PLAN    ICD-10-CM ICD-9-CM    1. Essential hypertension I10 401.9    2. Chronic left-sided low back pain with left-sided sciatica M54.42 724.2 traMADol (ULTRAM) 50 mg tablet    G89.29 724.3      338.29    3. Lymphedema of both lower extremities I89.0 457.1 REFERRAL TO HOME HEALTH   4.  Moderate major depression (HCC) F32.1 296.22    5. Generalized anxiety disorder F41.1 300.02      Diagnoses and all orders for this visit:    1. Essential hypertension  Controlled on benazepril. 2. Chronic left-sided low back pain with left-sided sciatica  This is a chronic problem that is not changed. Per review of available records and patients , there are not sign of overuse, misuse, diversion, or concerning side effects. Today we reviewed: alternative treatment options including non-narcotic modalities  The following changes were made to the patients current treatment plan: nothing, medications refilled. -     Refill traMADol (ULTRAM) 50 mg tablet; Take 1 Tab by mouth every eight (8) hours as needed for Pain for up to 15 days. Max Daily Amount: 150 mg.    3. Lymphedema of both lower extremities  -     REFERRAL TO [facility]    4. Moderate major depression (HCC)  Continue Prozac. 5. Generalized anxiety disorder  Continue Prozac. Greater than 40 mins direct face-to-face time spent with patient. Greater than 50% of time spent on counseling and coordination of care. Follow-up and Dispositions    · Return in about 3 months (around 5/7/2020), or if symptoms worsen or fail to improve, for HTN, hyperlipidemia, chronic pain; plan fasting labs on same day. I have discussed the diagnosis with the patient and the intended plan as seen in the above orders. Patient is in agreement. The patient has received an after-visit summary and questions were answered concerning future plans. I have discussed medication side effects and warnings with the patient as well.

## 2020-02-10 ENCOUNTER — TELEPHONE (OUTPATIENT)
Dept: INTERNAL MEDICINE CLINIC | Facility: CLINIC | Age: 60
End: 2020-02-10

## 2020-02-10 NOTE — TELEPHONE ENCOUNTER
Tangela Phelan, from [address], called to notify Dr. Lowell Dominguez that they are unable to accept the referral for home health for this pt due to limited staffing. Please call back at [phone] with any questions.

## 2020-02-11 ENCOUNTER — TELEPHONE (OUTPATIENT)
Dept: INTERNAL MEDICINE CLINIC | Facility: CLINIC | Age: 60
End: 2020-02-11

## 2020-02-11 ENCOUNTER — DOCUMENTATION ONLY (OUTPATIENT)
Dept: INTERNAL MEDICINE CLINIC | Facility: CLINIC | Age: 60
End: 2020-02-11

## 2020-02-11 DIAGNOSIS — I89.0 LYMPHEDEMA OF BOTH LOWER EXTREMITIES: Primary | ICD-10-CM

## 2020-02-11 NOTE — TELEPHONE ENCOUNTER
Ashley Rodriguez with [facility] does not participate with pt insurance.  She did state that [facility] does take the pt insurance

## 2020-02-11 NOTE — PROGRESS NOTES
Confirmed with [facility] that they received the [facility] order. They stated yes and they needed to make sure her insurance was okay.

## 2020-02-25 ENCOUNTER — TELEPHONE (OUTPATIENT)
Dept: INTERNAL MEDICINE CLINIC | Facility: CLINIC | Age: 60
End: 2020-02-25

## 2020-02-25 NOTE — TELEPHONE ENCOUNTER
Zuhair Muniz [facility] and she has a request from [facility] for skilled home health nursing and physical therapy and she needs more information.   [phone]

## 2020-02-26 NOTE — TELEPHONE ENCOUNTER
Dolly Goldsmith from [facility] wanted to know how long does the provider expect the patient will need her legs wrapped and how long after the wrap is applied will it need to be changed.

## 2020-02-27 NOTE — TELEPHONE ENCOUNTER
I called Damian Salomon from [facility] and informed her of the information per Dr. Violeta Blank. She understood and did not have any questions at this time.

## 2020-02-27 NOTE — TELEPHONE ENCOUNTER
Tell her that patient will likely need home health for leg wrapping for 6 weeks with wrapping changes twice a week.

## 2020-03-04 DIAGNOSIS — M54.12 CERVICAL RADICULOPATHY: ICD-10-CM

## 2020-03-04 DIAGNOSIS — E66.01 MORBID OBESITY WITH BMI OF 50.0-59.9, ADULT (HCC): ICD-10-CM

## 2020-03-04 DIAGNOSIS — F41.1 GENERALIZED ANXIETY DISORDER: ICD-10-CM

## 2020-03-04 NOTE — TELEPHONE ENCOUNTER
PCP: Pineda Cerda MD     Last appt: 2/7/2020   Future Appointments   Date Time Provider Keke Jerez   5/8/2020  2:20 PM Pineda Cerda MD [facility]        Requested Prescriptions     Pending Prescriptions Disp Refills    gabapentin (NEURONTIN) 300 mg capsule 30 Cap 0     Sig: Take 1 Cap by mouth nightly.  Max Daily Amount: 300 mg.    FLUoxetine (PROZAC) 40 mg capsule 90 Cap 3     Sig: TAKE ONE CAPSULE BY MOUTH DAILY

## 2020-03-04 NOTE — TELEPHONE ENCOUNTER
[facility] delivery pharmacy sent a fax requesting a 90-day supply of   Requested Prescriptions     Pending Prescriptions Disp Refills    gabapentin (NEURONTIN) 300 mg capsule 30 Cap 0     Sig: Take 1 Cap by mouth nightly. Max Daily Amount: 300 mg.    FLUoxetine (PROZAC) 40 mg capsule 90 Cap 3       Be faxed to [phone].

## 2020-03-05 RX ORDER — GABAPENTIN 300 MG/1
300 CAPSULE ORAL
Qty: 30 CAP | Refills: 0 | Status: SHIPPED | OUTPATIENT
Start: 2020-03-05 | End: 2021-01-12

## 2020-03-05 RX ORDER — FLUOXETINE HYDROCHLORIDE 40 MG/1
CAPSULE ORAL
Qty: 90 CAP | Refills: 3 | Status: SHIPPED | OUTPATIENT
Start: 2020-03-05 | End: 2020-03-30 | Stop reason: SDUPTHER

## 2020-03-16 ENCOUNTER — TELEPHONE (OUTPATIENT)
Dept: INTERNAL MEDICINE CLINIC | Facility: CLINIC | Age: 60
End: 2020-03-16

## 2020-03-16 DIAGNOSIS — I89.0 LYMPHEDEMA OF BOTH LOWER EXTREMITIES: Primary | ICD-10-CM

## 2020-03-16 NOTE — TELEPHONE ENCOUNTER
Jasmin from [facility] called and is requesting new home health orders for this pt after a \"snag\" with the insurance company. Please fax to [phone]. Please call Jasmin back at [phone].

## 2020-03-30 DIAGNOSIS — F41.1 GENERALIZED ANXIETY DISORDER: ICD-10-CM

## 2020-03-30 DIAGNOSIS — E66.01 MORBID OBESITY WITH BMI OF 50.0-59.9, ADULT (HCC): ICD-10-CM

## 2020-03-30 DIAGNOSIS — I10 ESSENTIAL HYPERTENSION: ICD-10-CM

## 2020-03-30 NOTE — TELEPHONE ENCOUNTER
Patient sent a [facility] message. The patient also wanted to know if she could try a 5% Lidocaine cream to see if it would help more than the patches.

## 2020-03-31 ENCOUNTER — PATIENT MESSAGE (OUTPATIENT)
Dept: INTERNAL MEDICINE CLINIC | Facility: CLINIC | Age: 60
End: 2020-03-31

## 2020-03-31 RX ORDER — FLUOXETINE HYDROCHLORIDE 40 MG/1
CAPSULE ORAL
Qty: 90 CAP | Refills: 3 | Status: SHIPPED | OUTPATIENT
Start: 2020-03-31 | End: 2020-11-02

## 2020-03-31 RX ORDER — BENAZEPRIL HYDROCHLORIDE 40 MG/1
40 TABLET ORAL DAILY
Qty: 90 TAB | Refills: 3 | Status: SHIPPED | OUTPATIENT
Start: 2020-03-31 | End: 2020-11-02

## 2020-03-31 NOTE — TELEPHONE ENCOUNTER
I sent the patient a [employer] message with the information per Dr. Francisco J Norwood.

## 2020-06-24 ENCOUNTER — TELEPHONE (OUTPATIENT)
Dept: INTERNAL MEDICINE CLINIC | Facility: CLINIC | Age: 60
End: 2020-06-24

## 2020-06-24 DIAGNOSIS — L72.3 SEBACEOUS CYST: Primary | ICD-10-CM

## 2020-06-24 NOTE — TELEPHONE ENCOUNTER
----- Message from Solange Wilson LPN sent at 6/33/7928  4:35 PM EDT -----  Regarding: FW: Update Medical Information  Contact: [phone]    ----- Message -----  From: Trevor Snow  Sent: 6/22/2020   1:11 PM EDT  To: [facility]  Subject: Candice Buck,   I mentioned a sist or something that is on the side of my face during a visit not long ago and it had never completely gone away. It has begun to get bigger and has alot of redness. not sure what do do for it .

## 2020-06-24 NOTE — TELEPHONE ENCOUNTER
Inform patient that I have placed a referral order for her to see a dermatologist.  Give her the information over the phone so she can call today to schedule an appointment. If she cannot get an appointment then she should schedule an appointment to see me in person or by virtual video visit (no phone only visit).

## 2020-08-25 ENCOUNTER — PATIENT MESSAGE (OUTPATIENT)
Dept: INTERNAL MEDICINE CLINIC | Facility: CLINIC | Age: 60
End: 2020-08-25

## 2020-08-25 DIAGNOSIS — G89.29 CHRONIC LEFT-SIDED LOW BACK PAIN WITH LEFT-SIDED SCIATICA: ICD-10-CM

## 2020-08-25 DIAGNOSIS — M54.42 CHRONIC LEFT-SIDED LOW BACK PAIN WITH LEFT-SIDED SCIATICA: ICD-10-CM

## 2020-08-26 RX ORDER — TRAMADOL HYDROCHLORIDE 50 MG/1
50 TABLET ORAL
Qty: 45 TAB | Refills: 0 | Status: SHIPPED | OUTPATIENT
Start: 2020-08-26 | End: 2020-09-03 | Stop reason: SDUPTHER

## 2020-09-03 NOTE — TELEPHONE ENCOUNTER
Called [facility], pt has not picked up RX, informed to cancel. Refill request sent to provider. PCP: Nima Melchor MD     Last appt: 2/7/2020   No future appointments. Requested Prescriptions     Pending Prescriptions Disp Refills    traMADoL (ULTRAM) 50 mg tablet 45 Tab 0     Sig: Take 1 Tab by mouth every eight (8) hours as needed for Pain for up to 15 days. Max Daily Amount: 150 mg.

## 2020-09-03 NOTE — TELEPHONE ENCOUNTER
----- Message from Urban Contreras sent at 9/3/2020 12:58 PM EDT -----  Regarding: RE: Prescription Question  Contact: [phone]  I checked with [facility] but no prescriptions were there for me. You said you sent to [facility] in a previous message but responded to let you know I don't use them anymore.  [facility] [place] [facility]'t [facility] tramadol

## 2020-09-04 RX ORDER — TRAMADOL HYDROCHLORIDE 50 MG/1
50 TABLET ORAL
Qty: 45 TAB | Refills: 0 | Status: SHIPPED | OUTPATIENT
Start: 2020-09-04 | End: 2020-12-18 | Stop reason: SDUPTHER

## 2020-09-24 ENCOUNTER — VIRTUAL VISIT (OUTPATIENT)
Dept: INTERNAL MEDICINE CLINIC | Age: 60
End: 2020-09-24
Payer: COMMERCIAL

## 2020-09-24 DIAGNOSIS — F41.1 GENERALIZED ANXIETY DISORDER: ICD-10-CM

## 2020-09-24 DIAGNOSIS — G89.4 CHRONIC PAIN SYNDROME: Primary | ICD-10-CM

## 2020-09-24 DIAGNOSIS — M54.12 CERVICAL RADICULOPATHY: ICD-10-CM

## 2020-09-24 DIAGNOSIS — M17.0 PRIMARY OSTEOARTHRITIS OF BOTH KNEES: ICD-10-CM

## 2020-09-24 DIAGNOSIS — L72.9 SKIN CYST: ICD-10-CM

## 2020-09-24 DIAGNOSIS — F33.1 MODERATE EPISODE OF RECURRENT MAJOR DEPRESSIVE DISORDER (HCC): ICD-10-CM

## 2020-09-24 PROCEDURE — 99443 PR PHYS/QHP TELEPHONE EVALUATION 21-30 MIN: CPT | Performed by: INTERNAL MEDICINE

## 2020-09-24 RX ORDER — BUSPIRONE HYDROCHLORIDE 7.5 MG/1
7.5 TABLET ORAL 2 TIMES DAILY
Qty: 60 TAB | Refills: 1 | Status: SHIPPED | OUTPATIENT
Start: 2020-09-24 | End: 2021-04-14

## 2020-09-24 NOTE — PROGRESS NOTES
Burney Phoenix is a 64 y.o. female, evaluated via audio-only technology on 9/24/2020 for Hypertension; Cholesterol Problem; Fibromyalgia; and Depression  . Assessment & Plan:     Diagnoses and all orders for this visit:    1. Chronic pain syndrome  This is a chronic problem that is not changed. Per review of available records and patients , there are not sign of overuse, misuse, diversion, or concerning side effects. Today we reviewed: alternative treatment options including non-narcotic modalities  The following changes were made to the patients current treatment plan: none, will be refilled when next due. 2. Cervical radiculopathy  Continue gabapentin. She wants to follow up with Dr. Charo Calle. 3. Primary osteoarthritis of both knees    4. Skin cyst  She will send me a picture through My Chart. 5. Generalized anxiety disorder  -     Start busPIRone (BUSPAR) 7.5 mg tablet; Take 1 Tab by mouth two (2) times a day. 6. Moderate episode of recurrent major depressive disorder (HCC)  Stable. Continue Prozac 40 mg 1 capsule daily. Follow-up and Dispositions    · Return in about 4 months (around 1/24/2021), or if symptoms worsen or fail to improve, for Chronic pain, HTN.         12  Subjective:     Presents for follow up visit. Last seen 9 months ago. 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      She complains of increased anxiety. Having panic attacks and emotional outbursts. Depressed mood. Denies SI or HI. Stress level high because she had a court hearing in June and was told she needed further evaluation to see if she was really disabled. Was scheduled to see a chiropractor on 10/8/20. Also stressed about her financial situation. Kimmie Valencia helped pay her mortgage. Complains about a bump on the left side of her neck present for months.   Was referred to see a dermatologist but none accepted her insurance. Used warm compresses on it. Drained pus a few weeks ago, smaller now. Has chronic neck pain that has flared up recently. Sharp pain at the left side of the back of her head that radiates down neck. Has a cat that provides her emotional support. Wants a letter stating that her cat is her emotional support pet. Chronic Pain Review  Phill Becker Oskar RTC today to follow up on chronic pain.  We discussed her osteoarthritis, fibromyalgia, radiculopathy and spondylolisthesis that is affecting her back, arms, hips (left > right) and legs.  Significant changes since last visit: none.  She is  able to do her normal daily activities.  She reports the following adverse side effects: none.  Uses a wheelchair at times at home. Ambulates with a cane.  Sleeps in a recliner. Nolia Hefty worsens with standing or walking. Takes Tramadol 50 mg 1 tab 1-2 times a day about 3 times a week as needed for pain.  Lyrica did not help her symptoms. EMG/NCV on 12/3/19 that showed severe right and mild left CTS.     Least pain over the last week has been 7/10. Worst pain over the last week has been 10/10. Opioid Risk Tool Reviewed: YES  Aberrant behaviors: None.    Urine Drug Screen: UTD  Controlled substance agreement on file: YES.      reviewed:yes  Pill count is consistent with her prescription: yes  Concomitant use of a benzodiazepine: no  Active daily MME is 15 mg. Naloxone prescription not warranted.     Health Maintenance  Flu vaccine: declined  Pneumonia vaccine: declined  Tetanus vaccine: declined                                    Prior to Admission medications    Medication Sig Start Date End Date Taking? Authorizing Provider   zinc 50 mg tab tablet Take 50 mg by mouth daily. Yes Provider, Historical   lidocaine (LIDODERM) 5 % Apply patch to the affected area for 12 hours a day and remove for 12 hours a day.  Dx: H81.69, G89.29 7/28/20  Yes Lucinda Lucia MD benazepriL (LOTENSIN) 40 mg tablet Take 1 Tab by mouth daily. For blood pressure. 3/31/20  Yes Eder Souza MD   FLUoxetine (PROzac) 40 mg capsule TAKE ONE CAPSULE BY MOUTH DAILY 3/31/20  Yes Jae Deshpande MD   gabapentin (NEURONTIN) 300 mg capsule Take 1 Cap by mouth nightly. Max Daily Amount: 300 mg. 3/5/20  Yes Jae Deshpande MD   methyl salicylate/menthol (CECIL HERRERA EX) by Apply Externally route. Yes Provider, Historical   methocarbamol (ROBAXIN) 750 mg tablet Take 1 Tab by mouth three (3) times daily as needed (muscle spasm/tightness). 8/16/19  Yes Eder Souza MD   cholecalciferol, VITAMIN D3, (VITAMIN D3) 5,000 unit tab tablet Take 10,000 Units by mouth daily. Yes Provider, Historical   naproxen sodium (ALEVE) 220 mg cap Take  by mouth. Yes Other, MD Ilda   ascorbic acid, vitamin C, (VITAMIN C) 250 mg tablet Take 1,000 mg by mouth daily. Yes Provider, Historical     Patient Active Problem List   Diagnosis Code    Essential hypertension I10    Numbness of feet R20.0    Osteopenia M85.80    Fibromyalgia M79.7    Left sided sciatica M54.32    Generalized anxiety disorder F41.1    Lumbar herniated disc M51.26    Primary osteoarthritis of left hip M16.12    Primary osteoarthritis of both knees M17.0    Degenerative lumbar disc M51.36    Morbid obesity with BMI of 50.0-59.9, adult ([facility] E66.01, Z68.43    High risk medication use Z79.899    Moderate major depression ([facility] F32.1    Lymphedema I89.0    Chronic venous hypertension (idiopathic) with ulcer and inflammation of bilateral lower extremity (CODE) ([facility] I87.333    Cellulitis of left lower extremity without foot L03. 116    Cellulitis of right lower extremity without foot L03.115    Nocturnal leg cramps G47.62    Cervical radiculopathy M54.12    Lymphedema of both lower extremities I89.0    Mixed hyperlipidemia E78.2    Vitamin D deficiency E55.9       No flowsheet data found.      [place] Marley, who was evaluated through a patient-initiated, synchronous (real-time) audio only encounter, and/or her healthcare decision maker, is aware that it is a billable service, with coverage as determined by her insurance carrier. She provided verbal consent to proceed: Yes. She has not had a related appointment within my department in the past 7 days or scheduled within the next 24 hours.       Total Time: minutes: 21-30 minutes    Clarence Bonilla MD

## 2020-09-24 NOTE — LETTER
NOTIFICATION RETURN TO WORK / SCHOOL 
 
9/24/2020 12:11 PM 
 
Ms. Chapis Mcpherson [address] [place] [id number] To Whom It May Concern: 
 
Chapis Mcpherson is currently under the care of [address] [place] [address]. Please note that she has a cat that serves as her emotional support pet for depression and anxiety. If there are questions or concerns please have the patient contact our office. Sincerely, Erin Noonan MD

## 2020-09-24 NOTE — PROGRESS NOTES
Yamini Trevino  Identified pt with two pt identifiers(name and ). Chief Complaint   Patient presents with    Hypertension    Cholesterol Problem    Fibromyalgia    Depression       Reviewed record In preparation for visit and have obtained necessary documentation. 1. Have you been to the ER, urgent care clinic or hospitalized since your last visit? No     2. Have you seen or consulted any other health care providers outside of the [address] [place] [address] since your last visit? Include any pap smears or colon screening. No    Vitals reviewed with provider.     Health Maintenance reviewed:     Health Maintenance Due   Topic    Shingrix Vaccine Age 49> (1 of 2)    Breast Cancer Screen Mammogram     FOBT Q1Y Age,18+     Flu Vaccine (1)          Wt Readings from Last 3 Encounters:   20 310 lb 12.8 oz (141 kg)   19 311 lb (141.1 kg)   19 303 lb (137.4 kg)        Temp Readings from Last 3 Encounters:   20 98.3 °F (36.8 °C) (Oral)   19 98.9 °F (37.2 °C) (Oral)   19 98.2 °F (36.8 °C) (Oral)        BP Readings from Last 3 Encounters:   20 122/79   19 147/81   19 127/78        Pulse Readings from Last 3 Encounters:   20 83   19 89   19 91        Vitals:    20 0700   PainSc:   8   PainLoc: Generalized          Learning Assessment:   :       Learning Assessment 2020 2015 3/26/2014   PRIMARY LEARNER Patient Patient Patient   HIGHEST LEVEL OF EDUCATION - PRIMARY LEARNER  - GRADUATED HIGH SCHOOL OR GED GRADUATED HIGH SCHOOL OR GED   BARRIERS PRIMARY LEARNER - NONE NONE   CO-LEARNER CAREGIVER - No No   PRIMARY LANGUAGE ENGLISH ENGLISH ENGLISH   LEARNER PREFERENCE PRIMARY OTHER (COMMENT) LISTENING VIDEOS   ANSWERED BY self patient patient   RELATIONSHIP SELF SELF SELF        Depression Screening:   :       3 most recent PHQ Screens 2020   PHQ Not Done Active Diagnosis of Depression or Bipolar Disorder   Little interest or pleasure in doing things -   Feeling down, depressed, irritable, or hopeless -   Total Score PHQ 2 -   Trouble falling or staying asleep, or sleeping too much -   Feeling tired or having little energy -   Poor appetite, weight loss, or overeating -   Feeling bad about yourself - or that you are a failure or have let yourself or your family down -   Trouble concentrating on things such as school, work, reading, or watching TV -   Moving or speaking so slowly that other people could have noticed; or the opposite being so fidgety that others notice -   Thoughts of being better off dead, or hurting yourself in some way -   PHQ 9 Score -   How difficult have these problems made it for you to do your work, take care of your home and get along with others -        Fall Risk Assessment:   :       Fall Risk Assessment, last 12 mths 8/9/2019   Able to walk? Yes   Fall in past 12 months? Yes   Fall with injury? No        Abuse Screening:   :       Abuse Screening Questionnaire 8/9/2019   Do you ever feel afraid of your partner? N   Are you in a relationship with someone who physically or mentally threatens you? N   Is it safe for you to go home?  Y        ADL Screening:   :       ADL Assessment 2/7/2020   Feeding yourself No Help Needed   Getting from bed to chair No Help Needed   Getting dressed No Help Needed   Bathing or showering No Help Needed   Walk across the room (includes cane/walker) No Help Needed   Using the telphone No Help Needed   Taking your medications No Help Needed   Preparing meals No Help Needed   Managing money (expenses/bills) No Help Needed   Moderately strenuous housework (laundry) No Help Needed   Shopping for personal items (toiletries/medicines) No Help Needed   Shopping for groceries No Help Needed   Driving No Help Needed   Climbing a flight of stairs Help Needed   Getting to places beyond walking distances Help Needed

## 2020-12-17 ENCOUNTER — PATIENT MESSAGE (OUTPATIENT)
Dept: INTERNAL MEDICINE CLINIC | Age: 60
End: 2020-12-17

## 2020-12-17 DIAGNOSIS — M54.42 CHRONIC LEFT-SIDED LOW BACK PAIN WITH LEFT-SIDED SCIATICA: ICD-10-CM

## 2020-12-17 DIAGNOSIS — G89.29 CHRONIC LEFT-SIDED LOW BACK PAIN WITH LEFT-SIDED SCIATICA: ICD-10-CM

## 2020-12-18 RX ORDER — TRAMADOL HYDROCHLORIDE 50 MG/1
50 TABLET ORAL
Qty: 45 TAB | Refills: 0 | Status: SHIPPED | OUTPATIENT
Start: 2020-12-18 | End: 2021-01-29 | Stop reason: SDUPTHER

## 2020-12-18 NOTE — TELEPHONE ENCOUNTER
----- Message from Urban Montanez. [facility] sent at 12/17/2020  5:56 PM EST -----  Regarding: Prescription Question  Contact: [phone]  Hi Dr. Bogdan Stapleton ,  I wasn't able to request a refill for the tramadol through [address] [place] [address]   and need a refill .   Thank you

## 2020-12-18 NOTE — TELEPHONE ENCOUNTER
REFILL     PCP: Rachele Hayes MD     Last appt: 9/24/2020   Future Appointments   Date Time Provider Keke Jerez   1/12/2021  2:20 PM Rachele Hayes MD [facility]        Requested Prescriptions     Pending Prescriptions Disp Refills    traMADoL (ULTRAM) 50 mg tablet 45 Tab 0     Sig: Take 1 Tab by mouth every eight (8) hours as needed for Pain for up to 15 days. Max Daily Amount: 150 mg.

## 2020-12-23 ENCOUNTER — TELEPHONE (OUTPATIENT)
Dept: INTERNAL MEDICINE CLINIC | Age: 60
End: 2020-12-23

## 2021-01-12 ENCOUNTER — OFFICE VISIT (OUTPATIENT)
Dept: INTERNAL MEDICINE CLINIC | Age: 61
End: 2021-01-12
Payer: COMMERCIAL

## 2021-01-12 VITALS
HEIGHT: 64 IN | TEMPERATURE: 98.3 F | OXYGEN SATURATION: 94 % | DIASTOLIC BLOOD PRESSURE: 64 MMHG | BODY MASS INDEX: 50.02 KG/M2 | RESPIRATION RATE: 16 BRPM | WEIGHT: 293 LBS | HEART RATE: 94 BPM | SYSTOLIC BLOOD PRESSURE: 131 MMHG

## 2021-01-12 DIAGNOSIS — I89.0 LYMPHEDEMA: ICD-10-CM

## 2021-01-12 DIAGNOSIS — M79.7 FIBROMYALGIA: ICD-10-CM

## 2021-01-12 DIAGNOSIS — I10 ESSENTIAL HYPERTENSION: ICD-10-CM

## 2021-01-12 DIAGNOSIS — Z12.11 SCREENING FOR COLON CANCER: ICD-10-CM

## 2021-01-12 DIAGNOSIS — E66.01 MORBID OBESITY WITH BMI OF 50.0-59.9, ADULT (HCC): ICD-10-CM

## 2021-01-12 DIAGNOSIS — M54.32 LEFT SIDED SCIATICA: ICD-10-CM

## 2021-01-12 DIAGNOSIS — G89.4 CHRONIC PAIN SYNDROME: Primary | ICD-10-CM

## 2021-01-12 DIAGNOSIS — M16.12 PRIMARY OSTEOARTHRITIS OF LEFT HIP: ICD-10-CM

## 2021-01-12 PROCEDURE — 99214 OFFICE O/P EST MOD 30 MIN: CPT | Performed by: INTERNAL MEDICINE

## 2021-01-12 NOTE — PATIENT INSTRUCTIONS
Well Visit, Women 50 to 65: Care Instructions  Your Care Instructions     Physical exams can help you stay healthy. Your doctor has checked your overall health and may have suggested ways to take good care of yourself. He or she also may have recommended tests. At home, you can help prevent illness with healthy eating, regular exercise, and other steps.  Follow-up care is a key part of your treatment and safety. Be sure to make and go to all appointments, and call your doctor if you are having problems. It's also a good idea to know your test results and keep a list of the medicines you take.  How can you care for yourself at home?  · Reach and stay at a healthy weight. This will lower your risk for many problems, such as obesity, diabetes, heart disease, and high blood pressure.  · Get at least 30 minutes of exercise on most days of the week. Walking is a good choice. You also may want to do other activities, such as running, swimming, cycling, or playing tennis or team sports.  · Do not smoke. Smoking can make health problems worse. If you need help quitting, talk to your doctor about stop-smoking programs and medicines. These can increase your chances of quitting for good.  · Protect your skin from too much sun. When you're outdoors from 10 a.m. to 4 p.m., stay in the shade or cover up with clothing and a hat with a wide brim. Wear sunglasses that block UV rays. Even when it's cloudy, put broad-spectrum sunscreen (SPF 30 or higher) on any exposed skin.  · See a dentist one or two times a year for checkups and to have your teeth cleaned.  · Wear a seat belt in the car.  Follow your doctor's advice about when to have certain tests. These tests can spot problems early.  · Cholesterol. Your doctor will tell you how often to have this done based on your age, family history, or other things that can increase your risk for heart attack and stroke.  · Blood pressure. Have your blood pressure checked during a routine  doctor visit. Your doctor will tell you how often to check your blood pressure based on your age, your blood pressure results, and other factors. · Mammogram. Ask your doctor how often you should have a mammogram, which is an X-ray of your breasts. A mammogram can spot breast cancer before it can be felt and when it is easiest to treat. · Pap test and pelvic exam. Ask your doctor how often you should have a Pap test. You may not need to have a Pap test as often as you used to. · Vision. Have your eyes checked every year or two or as often as your doctor suggests. Some experts recommend that you have yearly exams for glaucoma and other age-related eye problems starting at age 48. · Hearing. Tell your doctor if you notice any change in your hearing. You can have tests to find out how well you hear. · Diabetes. Ask your doctor whether you should have tests for diabetes. · Colorectal cancer. Your risk for colorectal cancer gets higher as you get older. Some experts say that adults should start regular screening at age 48 and stop at age 76. Others say to start before age 48 or continue after age 76. Talk with your doctor about your risk and when to start and stop screening. · Thyroid disease. Talk to your doctor about whether to have your thyroid checked as part of a regular physical exam. Women have an increased chance of a thyroid problem. · Osteoporosis. You should begin tests for bone density at age 72. If you are younger than 72, ask your doctor whether you have factors that may increase your risk for this disease. You may want to have this test before age 72. · Heart attack and stroke risk. At least every 4 to 6 years, you should have your risk for heart attack and stroke assessed. Your doctor uses factors such as your age, blood pressure, cholesterol, and whether you smoke or have diabetes to show what your risk for a heart attack or stroke is over the next 10 years.   When should you call for help?  Watch closely for changes in your health, and be sure to contact your doctor if you have any problems or symptoms that concern you. Where can you learn more? Go to http://www.gray.com/  Enter [id number] in the search box to learn more about \"Well Visit, Women 50 to 72: Care Instructions. \"  Current as of: May 27, 2020               Content Version: 12.6  © 2006-2020 [employer]. Care instructions adapted under license by [employer] (which disclaims liability or warranty for this information). If you have questions about a medical condition or this instruction, always ask your healthcare professional. [address] any warranty or liability for your use of this information.

## 2021-01-12 NOTE — PROGRESS NOTES
CC:   Chief Complaint   Patient presents with    Pain (Chronic)    Hypertension    Follow-up       HISTORY OF PRESENT ILLNESS  Cedric Cochran is a 64 y.o. female. Presents for 4 month follow up evaluation. 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      Was approved for SSI disability in Oct 2019. [facility]. Concerned about her weight because knows she will need to lose weight before having hip surgery. Is in the process of getting certified for cannabis oil for pain. Depression and anxiety are controlled. Moved her mother from [facility] to [facility] so closer to her. Lymphedema is better. Uses wraps on legs regularly. Chronic Pain Review  [facility] today to follow up on chronic pain.  We discussed her left hip pain and chronic low back pain with sciatica.  Significant changes since last visit: none.  She is  able to do her normal daily activities.  She reports the following adverse side effects: none.  Uses a wheelchair at times at home.  Ambulates with a cane.  Pain worsens with standing or walking.  Takes Tramadol 50 mg 1 tab 1-2 times a day about 3 times a week as needed for pain.  Lyrica did not help her symptoms.      Least pain over the last week has been 6/10. Worst pain over the last week has been 10/10. Opioid Risk Tool Reviewed: YES  Aberrant behaviors: None.    Urine Drug Screen: due for this  Controlled substance agreement on file: YES.      reviewed:yes  Pill count is consistent with her prescription: yes  Concomitant use of a benzodiazepine: no  Active daily MME is 15 mg. Naloxone prescription not warranted.     ROS  A complete review of systems was performed and is negative except for those mentioned in the HPI.      Patient Active Problem List   Diagnosis Code    Essential hypertension I10    Numbness of feet R20.0    Osteopenia M85.80    Fibromyalgia M79.7    Left sided sciatica M54.32    Generalized anxiety disorder F41.1    Lumbar herniated disc M51.26    Primary osteoarthritis of left hip M16.12    Primary osteoarthritis of both knees M17.0    Degenerative lumbar disc M51.36    Morbid obesity with BMI of 50.0-59.9, adult ([facility]) E66.01, Z68.43    High risk medication use Z79.899    Moderate major depression ([facility]) F32.1    Lymphedema I89.0    Chronic venous hypertension (idiopathic) with ulcer and inflammation of bilateral lower extremity (CODE) ([facility]) I87.333    Cellulitis of left lower extremity without foot L03. 116    Cellulitis of right lower extremity without foot L03.115    Nocturnal leg cramps G47.62    Cervical radiculopathy M54.12    Lymphedema of both lower extremities I89.0    Mixed hyperlipidemia E78.2    Vitamin D deficiency E55.9    Chronic pain syndrome G89.4     Past Medical History:   Diagnosis Date    Depression with anxiety     Fibromyalgia     Hypertension     Morbid [facility] [address]     Osteoarthritis of knee 2016    R knee X-ray 7/5/16 ([facility]) mod to severe tibiofemoral arthritis, mod patellofemoral arthritis    Osteoarthritis of left hip 2015    Osteopenia      No Known Allergies    Current Outpatient Medications   Medication Sig Dispense Refill    benazepriL (LOTENSIN) 40 mg tablet TAKE 1 TABLET BY MOUTH EVERY DAY FOR BLOOD PRESSURE 30 Tab 11    FLUoxetine (PROzac) 40 mg capsule TAKE 1 CAPSULE BY MOUTH EVERY DAY 30 Cap 11    zinc 50 mg tab tablet Take 50 mg by mouth daily.  busPIRone (BUSPAR) 7.5 mg tablet Take 1 Tab by mouth two (2) times a day. 60 Tab 1    lidocaine (LIDODERM) 5 % Apply patch to the affected area for 12 hours a day and remove for 12 hours a day. Dx: M54.42, G89.29 30 Each 3    methyl salicylate/menthol (CECIL HERRERA EX) by Apply Externally route.       methocarbamol (ROBAXIN) 750 mg tablet Take 1 Tab by mouth three (3) times daily as needed (muscle spasm/tightness). 90 Tab 1    cholecalciferol, VITAMIN D3, (VITAMIN D3) 5,000 unit tab tablet Take 10,000 Units by mouth daily.  naproxen sodium (ALEVE) 220 mg cap Take  by mouth.  ascorbic acid, vitamin C, (VITAMIN C) 250 mg tablet Take 1,000 mg by mouth daily. PHYSICAL EXAM  Visit Vitals  /64 (BP 1 Location: Left arm, BP Patient Position: Sitting)   Pulse 94   Temp 98.3 °F (36.8 °C) (Oral)   Resp 16   Ht 5' 4\" (1.626 m)   Wt 327 lb (148.3 kg)   SpO2 94%   BMI 56.13 kg/m²       General: Morbidly obese, no distress. In wheelchair. HEENT:  Head normocephalic/atraumatic, no scleral icterus  Neck: Supple. No lymphadenopathy, or thyromegaly. Lungs:  Clear to ausculation bilaterally. Good air movement. Heart:  Regular rate and rhythm, normal S1 and S2, no murmur, gallop, or rub  Extremities: No clubbing, cyanosis. Legs in wrappings. Neurological: Alert and oriented. MS 4/5 at LLE, 5/5/ at RLE, 5/5 at bilateral UE's. 1+ ankle DTR's, 0 knee DTR's. Psychiatric: Normal mood and affect. Behavior is normal        ASSESSMENT AND PLAN    ICD-10-CM ICD-9-CM    1. Chronic pain syndrome  G89.4 338.4    2. Lymphedema  I89.0 457.1    3. Left sided sciatica  M54.32 724.3    4. Fibromyalgia  M79.7 729.1    5. Primary osteoarthritis of left hip  M16.12 715.15    6. Morbid obesity with BMI of 50.0-59.9, adult ([facility])  E66.01 278.01     Z68.43 V85.43    7. Screening for colon cancer  Z12.11 V76.51 OCCULT BLOOD IMMUNOASSAY,DIAGNOSTIC     Diagnoses and all orders for this visit:    1. Chronic pain syndrome  This is a chronic problem that is not changed.  Due to OA, fibromyalgia, and polyneuropathy.   Per review of available records and patients , there are not sign of overuse, misuse, diversion, or concerning side effects. Today we reviewed: alternative treatment options including non-narcotic modalities  The following changes were made to the patients current treatment plan: nothing, continue Tramadol. 2. Lymphedema  Continue  Using leg wraps. 3. Left sided sciatica  See #1.    4. Fibromyalgia  See #1. 5. Primary osteoarthritis of left hip  Continue Tramadol as needed. [facility]. 6. Morbid obesity with BMI of 50.0-59.9, adult [facility])  I recommended she see Bariatric Surgery. Wants to think about it more first.    7. Screening for colon cancer  -     OCCULT BLOOD IMMUNOASSAY,DIAGNOSTIC; Future    HTN controlled on benazepril. Due for labs. Wants to wait until clinic lab is open. Follow-up and Dispositions    · Return in about 4 months (around 5/12/2021), or if symptoms worsen or fail to improve, for Chronic pain, weight, labs on same day. I have discussed the diagnosis with the patient and the intended plan as seen in the above orders. Patient is in agreement. The patient has received an after-visit summary and questions were answered concerning future plans. I have discussed medication side effects and warnings with the patient as well.

## 2021-01-12 NOTE — PROGRESS NOTES
Trevor Snow is a 64 y.o. female  Chief Complaint   Patient presents with    Pain (Chronic)    Hypertension    Follow-up     Health Maintenance Due   Topic Date Due    Shingrix Vaccine Age 49> (1 of 2) 10/10/2012    PAP AKA CERVICAL CYTOLOGY  11/20/2016    Breast Cancer Screen Mammogram  06/29/2017    Colorectal Cancer Screening Combo  04/10/2018     Visit Vitals  /64 (BP 1 Location: Left arm, BP Patient Position: Sitting)   Pulse 94   Temp 98.3 °F (36.8 °C) (Oral)   Resp 16   Ht 5' 4\" (1.626 m)   Wt 327 lb (148.3 kg)   SpO2 94%   BMI 56.13 kg/m²     1. Have you been to the ER, urgent care clinic since your last visit? Hospitalized since your last visit? No     2. Have you seen or consulted any other health care providers outside of the [address] [place] [address] since your last visit? Include any pap smears or colon screening.  No

## 2021-01-29 ENCOUNTER — TELEPHONE (OUTPATIENT)
Dept: INTERNAL MEDICINE CLINIC | Age: 61
End: 2021-01-29

## 2021-01-29 DIAGNOSIS — M54.42 CHRONIC LEFT-SIDED LOW BACK PAIN WITH LEFT-SIDED SCIATICA: ICD-10-CM

## 2021-01-29 DIAGNOSIS — G89.29 CHRONIC LEFT-SIDED LOW BACK PAIN WITH LEFT-SIDED SCIATICA: ICD-10-CM

## 2021-01-29 RX ORDER — TRAMADOL HYDROCHLORIDE 50 MG/1
50 TABLET ORAL
Qty: 45 TAB | Refills: 0 | Status: SHIPPED | OUTPATIENT
Start: 2021-01-29 | End: 2021-02-13

## 2021-01-29 NOTE — TELEPHONE ENCOUNTER
----- Message from Urban Mackenzie sent at 1/29/2021  9:11 AM EST -----  Regarding: Prescription Question  Contact: [phone]  I'm unable to request a refill on my tramadol through the regular process so just need to get a refill please

## 2021-01-29 NOTE — TELEPHONE ENCOUNTER
Prior authorization for Tramadol (per [employer]) has to be done via fax. Form will be faxed to the office and require office notes. [facility] [phone].

## 2021-01-29 NOTE — TELEPHONE ENCOUNTER
PCP: Krish Coto MD     Last appt: 1/12/2021   No future appointments. Requested Prescriptions     Pending Prescriptions Disp Refills    traMADoL (ULTRAM) 50 mg tablet 45 Tab 0     Sig: Take 1 Tab by mouth every eight (8) hours as needed for Pain for up to 15 days. Max Daily Amount: 150 mg.

## 2021-02-09 ENCOUNTER — TELEPHONE (OUTPATIENT)
Dept: INTERNAL MEDICINE CLINIC | Age: 61
End: 2021-02-09

## 2021-02-09 DIAGNOSIS — Z79.891 CHRONIC USE OF OPIATE DRUG FOR THERAPEUTIC PURPOSE: Primary | ICD-10-CM

## 2021-02-09 NOTE — TELEPHONE ENCOUNTER
Inform patient that she is due for a urine drug screen. We will mail her an order that she can take to [facility].

## 2021-02-09 NOTE — TELEPHONE ENCOUNTER
[facility] at [phone] to check on tramadol. Submitted verbal prior authorization for tramadol. Turn around time 24 hours. Patient may need urine drug screen. Please advise.

## 2021-03-29 ENCOUNTER — TELEPHONE (OUTPATIENT)
Dept: INTERNAL MEDICINE CLINIC | Age: 61
End: 2021-03-29

## 2021-03-29 NOTE — TELEPHONE ENCOUNTER
Patient called and has an upcoming appointment but was told she needed to have the blood work done before her next appointment, she is having trouble getting the blood work done. She is in to much pain going to different offices to get blood work done. Patient does not want to reschedule but wants to know if she comes to her appointment will she be seen if blood work is not complete. Please give patient a call back.     [facility]# [phone]

## 2021-04-08 ENCOUNTER — PATIENT MESSAGE (OUTPATIENT)
Dept: INTERNAL MEDICINE CLINIC | Age: 61
End: 2021-04-08

## 2021-04-14 ENCOUNTER — OFFICE VISIT (OUTPATIENT)
Dept: INTERNAL MEDICINE CLINIC | Age: 61
End: 2021-04-14
Payer: MEDICARE

## 2021-04-14 VITALS
BODY MASS INDEX: 50.02 KG/M2 | HEIGHT: 64 IN | DIASTOLIC BLOOD PRESSURE: 82 MMHG | HEART RATE: 87 BPM | RESPIRATION RATE: 16 BRPM | WEIGHT: 293 LBS | SYSTOLIC BLOOD PRESSURE: 148 MMHG | OXYGEN SATURATION: 93 % | TEMPERATURE: 98.4 F

## 2021-04-14 DIAGNOSIS — G89.4 CHRONIC PAIN SYNDROME: ICD-10-CM

## 2021-04-14 DIAGNOSIS — E78.2 MIXED HYPERLIPIDEMIA: ICD-10-CM

## 2021-04-14 DIAGNOSIS — M54.12 CERVICAL RADICULOPATHY: ICD-10-CM

## 2021-04-14 DIAGNOSIS — M16.12 PRIMARY OSTEOARTHRITIS OF LEFT HIP: ICD-10-CM

## 2021-04-14 DIAGNOSIS — F32.1 MODERATE MAJOR DEPRESSION (HCC): ICD-10-CM

## 2021-04-14 DIAGNOSIS — I10 ESSENTIAL HYPERTENSION: ICD-10-CM

## 2021-04-14 DIAGNOSIS — I89.0 LYMPHEDEMA OF BOTH LOWER EXTREMITIES: ICD-10-CM

## 2021-04-14 DIAGNOSIS — E55.9 VITAMIN D DEFICIENCY: ICD-10-CM

## 2021-04-14 DIAGNOSIS — Z00.00 WELCOME TO MEDICARE PREVENTIVE VISIT: Primary | ICD-10-CM

## 2021-04-14 DIAGNOSIS — E66.01 MORBID OBESITY WITH BMI OF 50.0-59.9, ADULT (HCC): ICD-10-CM

## 2021-04-14 DIAGNOSIS — R73.02 IGT (IMPAIRED GLUCOSE TOLERANCE): ICD-10-CM

## 2021-04-14 DIAGNOSIS — M51.36 DEGENERATIVE LUMBAR DISC: ICD-10-CM

## 2021-04-14 PROBLEM — G47.62 NOCTURNAL LEG CRAMPS: Status: RESOLVED | Noted: 2019-08-13 | Resolved: 2021-04-14

## 2021-04-14 PROBLEM — I87.333 CHRONIC VENOUS HYPERTENSION (IDIOPATHIC) WITH ULCER AND INFLAMMATION OF BILATERAL LOWER EXTREMITY (CODE) (HCC): Status: RESOLVED | Noted: 2018-12-06 | Resolved: 2021-04-14

## 2021-04-14 PROBLEM — L03.116 CELLULITIS OF LEFT LOWER EXTREMITY WITHOUT FOOT: Status: RESOLVED | Noted: 2018-12-06 | Resolved: 2021-04-14

## 2021-04-14 PROBLEM — Z79.899 HIGH RISK MEDICATION USE: Status: RESOLVED | Noted: 2017-12-04 | Resolved: 2021-04-14

## 2021-04-14 PROBLEM — L03.115 CELLULITIS OF RIGHT LOWER EXTREMITY WITHOUT FOOT: Status: RESOLVED | Noted: 2018-12-06 | Resolved: 2021-04-14

## 2021-04-14 LAB — MED LIST OPTION NOT SELECTED: NORMAL

## 2021-04-14 PROCEDURE — G8417 CALC BMI ABV UP PARAM F/U: HCPCS | Performed by: INTERNAL MEDICINE

## 2021-04-14 PROCEDURE — G8754 DIAS BP LESS 90: HCPCS | Performed by: INTERNAL MEDICINE

## 2021-04-14 PROCEDURE — 99215 OFFICE O/P EST HI 40 MIN: CPT | Performed by: INTERNAL MEDICINE

## 2021-04-14 PROCEDURE — G8427 DOCREV CUR MEDS BY ELIG CLIN: HCPCS | Performed by: INTERNAL MEDICINE

## 2021-04-14 PROCEDURE — 3017F COLORECTAL CA SCREEN DOC REV: CPT | Performed by: INTERNAL MEDICINE

## 2021-04-14 PROCEDURE — G0438 PPPS, INITIAL VISIT: HCPCS | Performed by: INTERNAL MEDICINE

## 2021-04-14 PROCEDURE — G9717 DOC PT DX DEP/BP F/U NT REQ: HCPCS | Performed by: INTERNAL MEDICINE

## 2021-04-14 PROCEDURE — G8753 SYS BP > OR = 140: HCPCS | Performed by: INTERNAL MEDICINE

## 2021-04-14 NOTE — PROGRESS NOTES
This is a \"Welcome to [employer]"  Initial Preventive Physical Examination (IPPE) providing Personalized Prevention Plan Services (Performed in the first 12 months of enrollment)    I have reviewed the patient's medical history in detail and updated the computerized patient record. Assessment/Plan   Education and counseling provided:  Are appropriate based on today's review and evaluation    1. Welcome to Medicare preventive visit       Depression Risk Screen     3 most recent PHQ Screens 4/14/2021   PHQ Not Done Active Diagnosis of Depression or Bipolar Disorder   Little interest or pleasure in doing things -   Feeling down, depressed, irritable, or hopeless -   Total Score PHQ 2 -   Trouble falling or staying asleep, or sleeping too much -   Feeling tired or having little energy -   Poor appetite, weight loss, or overeating -   Feeling bad about yourself - or that you are a failure or have let yourself or your family down -   Trouble concentrating on things such as school, work, reading, or watching TV -   Moving or speaking so slowly that other people could have noticed; or the opposite being so fidgety that others notice -   Thoughts of being better off dead, or hurting yourself in some way -   PHQ 9 Score -   How difficult have these problems made it for you to do your work, take care of your home and get along with others -       Alcohol Risk Screen    Do you average more than 1 drink per night or more than 7 drinks a week:  No    On any one occasion in the past three months have you have had more than 3 drinks containing alcohol:  No          Functional Ability and Level of Safety    Diet: The patient is prescribed and follows a special diet. Hearing: Hearing is good. Vision Screening:  Vision is good. No exam data present      Activities of Daily Living: The home contains: no safety equipment.   Patient does total self care      Ambulation: with difficulty, uses a cane      Exercise level: sedentary     Fall Risk Screen:  Fall Risk Assessment, last 12 mths 8/9/2019   Able to walk? Yes   Fall in past 12 months? Yes   Fall with injury? 0      Abuse Screen:  Patient is not abused       Screening EKG   EKG order placed: No    End of Life Planning   Advanced care planning directives were not discussed with the patient and/or family/caregiver. Health Maintenance Due     Health Maintenance Due   Topic Date Due    Shingrix Vaccine Age 49> (1 of 2) Never done    PAP AKA CERVICAL CYTOLOGY  11/20/2016    Breast Cancer Screen Mammogram  06/29/2017    Colorectal Cancer Screening Combo  04/10/2018       Patient Care Team   Patient Care Team:  Kindra Haro MD as PCP - General (Internal Medicine)  Kindra Haro MD as PCP - [facility] Provider    History     Past Medical History:   Diagnosis Date    Depression with anxiety     Fibromyalgia     Hypertension     Morbid obesity ([address]     Osteoarthritis of knee 2016    R knee X-ray 7/5/16 ([facility]) mod to severe tibiofemoral arthritis, mod patellofemoral arthritis    Osteoarthritis of left hip 2015    Osteopenia       Past Surgical History:   Procedure Laterality Date   Beatriz Mate Dr. Duane Shuck    HX FREE SKIN GRAFT  1990    [facility]    HX WISDOM TEETH EXTRACTION  1992     Current Outpatient Medications   Medication Sig Dispense Refill    COLLAGEN by Does Not Apply route.  ascorbic acid (VITAMIN C PO) Take 1 Tab by mouth daily. Takes 1 tab (1400 mg) daily.  benazepriL (LOTENSIN) 40 mg tablet TAKE 1 TABLET BY MOUTH EVERY DAY FOR BLOOD PRESSURE 30 Tab 11    FLUoxetine (PROzac) 40 mg capsule TAKE 1 CAPSULE BY MOUTH EVERY DAY 30 Cap 11    zinc 50 mg tab tablet Take 50 mg by mouth daily.  lidocaine (LIDODERM) 5 % Apply patch to the affected area for 12 hours a day and remove for 12 hours a day. Dx: M54.42, G89.29 30 Each 3    methyl salicylate/menthol (CECIL HERRERA EX) by Apply Externally route.       methocarbamol (ROBAXIN) 750 mg tablet Take 1 Tab by mouth three (3) times daily as needed (muscle spasm/tightness). 90 Tab 1    cholecalciferol, VITAMIN D3, (VITAMIN D3) 5,000 unit tab tablet Take 10,000 Units by mouth daily.  naproxen sodium (ALEVE) 220 mg cap Take  by mouth.        No Known Allergies    Family History   Problem Relation Age of Onset    Diabetes Mother         degenerative disk disease/fibromyalgia/artheritis    Depression Mother     Anxiety Mother     Other Mother         Fibromyalgia    Other Maternal Aunt         lymphoma    Cancer Maternal Aunt     No Known Problems Father     Dementia Maternal Grandmother      Social History     Tobacco Use    Smoking status: Never Smoker    Smokeless tobacco: Never Used   Substance Use Topics    Alcohol use: No     Alcohol/week: 0.0 standard drinks     Comment: previously drank beer quit in 2005       Johanna Frank MD

## 2021-04-14 NOTE — PATIENT INSTRUCTIONS

## 2021-04-14 NOTE — PROGRESS NOTES
CC:   Chief Complaint   Patient presents with   [facility]     Room 3B //       HISTORY OF PRESENT ILLNESS  Zeyad Lyons is a 64 y.o. female. Presents for Welcome to Medicare Visit and 3 month follow up evaluation. 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 On Medicare that started 4/1/21. Complains of chronic pain. Has not scheduled appointment with orthopedics yet for left hip OA because surgery was recommended 5 yrs ago and she wants to lose weight before being seen. Has already lost some weight by eating healthier. Plans to go the pool at [employer]. Decided not to pursue bariatric surgery. Wants a prescription for a lifting recliner. Has an old recliner at home that is now broken. Sleeps in recliner. Needs new leg wraps for lymphedema at legs. Velcro straps are worn in some areas. Received current wraps from [address] but hard physically to get back to that clinic. Chronic Pain Review  Irina Schmitt RTC today to follow up on chronic pain.  We discussed her left hip pain, chronic low back pain, and pain at both knees.  Significant changes since last visit: none.  She is  able to do her normal daily activities.  She reports the following adverse side effects: none.  Pain worse with standing or walking. Ambulates with a cane. Uses a wheelchair sometimes at home. Takes Tramadol 50 mg 1 tab 1-2 times a day as needed for pain.  Lyrica did not help her symptoms.      Least pain over the last week has been 5/10. Worst pain over the last week has been 10/10. Opioid Risk Tool Reviewed: YES  Aberrant behaviors: None.    Urine Drug Screen: due for this  Controlled substance agreement on file: YES.      reviewed:yes  Pill count is consistent with her prescription: yes (45 tabs prescribed on 1/29/21, has 14 tablets left)  Concomitant use of a benzodiazepine: no  Active daily MME is 15 mg. Naloxone prescription not warranted. ROS  A complete review of systems was performed and is negative except for those mentioned in the HPI. Patient Active Problem List   Diagnosis Code    Essential hypertension I10    Numbness of feet R20.0    Osteopenia M85.80    Fibromyalgia M79.7    Left sided sciatica M54.32    Generalized anxiety disorder F41.1    Lumbar herniated disc M51.26    Primary osteoarthritis of left hip M16.12    Primary osteoarthritis of both knees M17.0    Degenerative lumbar disc M51.36    Morbid obesity with BMI of 50.0-59.9, adult [facility] E66.01, Z68.43    High risk medication use Z79.899    Moderate major depression (HCC) F32.1    Lymphedema I89.0    Chronic venous hypertension (idiopathic) with ulcer and inflammation of bilateral lower extremity (CODE) ([facility] I87.333    Cellulitis of left lower extremity without foot L03. 116    Cellulitis of right lower extremity without foot L03.115    Nocturnal leg cramps G47.62    Cervical radiculopathy M54.12    Lymphedema of both lower extremities I89.0    Mixed hyperlipidemia E78.2    Vitamin D deficiency E55.9    Chronic pain syndrome G89.4    Chronic use of opiate drug for therapeutic purpose Z79.891     Past Medical History:   Diagnosis Date    Depression with anxiety     Fibromyalgia     Hypertension     Morbid obesity ([facility]     Osteoarthritis of knee 2016    R knee X-ray 7/5/16 ([facility]) mod to severe tibiofemoral arthritis, mod patellofemoral arthritis    Osteoarthritis of left hip 2015    Osteopenia      No Known Allergies    Current Outpatient Medications   Medication Sig Dispense Refill    COLLAGEN by Does Not Apply route.  ascorbic acid (VITAMIN C PO) Take 1 Tab by mouth daily. Takes 1 tab (1400 mg) daily.       benazepriL (LOTENSIN) 40 mg tablet TAKE 1 TABLET BY MOUTH EVERY DAY FOR BLOOD PRESSURE 30 Tab 11    FLUoxetine (PROzac) 40 mg capsule TAKE 1 CAPSULE BY MOUTH EVERY DAY 30 Cap 11    zinc 50 mg tab tablet Take 50 mg by mouth daily.  lidocaine (LIDODERM) 5 % Apply patch to the affected area for 12 hours a day and remove for 12 hours a day. Dx: M54.42, G89.29 30 Each 3    methyl salicylate/menthol (CECIL HERRERA EX) by Apply Externally route.  methocarbamol (ROBAXIN) 750 mg tablet Take 1 Tab by mouth three (3) times daily as needed (muscle spasm/tightness). 90 Tab 1    cholecalciferol, VITAMIN D3, (VITAMIN D3) 5,000 unit tab tablet Take 10,000 Units by mouth daily.  naproxen sodium (ALEVE) 220 mg cap Take  by mouth. PHYSICAL EXAM  Visit Vitals  BP (!) 148/82 (BP 1 Location: Left upper arm, BP Patient Position: Sitting, BP Cuff Size: Adult)   Pulse 87   Temp 98.4 °F (36.9 °C) (Oral)   Resp 16   Ht 5' 4\" (1.626 m)   Wt 312 lb 3.2 oz (141.6 kg)   SpO2 93%   BMI 53.59 kg/m²   15 lb weight loss since last clinic visit 3 months ago    General: Morbidly obese, no distress. In wheelchair. HEENT:  Head normocephalic/atraumatic, no scleral icterus  Neck: Supple. No lymphadenopathy, or thyromegaly. Lungs:  Clear to ausculation bilaterally. Good air movement. Heart:  Regular rate and rhythm, normal S1 and S2, no murmur, gallop, or rub  Extremities: No clubbing, cyanosis. Wearing compression stockings. 1-2+ pitting edema at bilateral LE's. Back: Tenderness at bilateral lumbar paravertebral areas  Neurological: Alert and oriented. Unchanged neurological exam.  Psychiatric: Normal mood and affect. Behavior is normal        ASSESSMENT AND PLAN    ICD-10-CM ICD-9-CM    1. Welcome to Medicare preventive visit  Z00.00 V70.0    2. Essential hypertension  M30 341.2 METABOLIC PANEL, COMPREHENSIVE      CBC WITH AUTOMATED DIFF   3. Mixed hyperlipidemia  E78.2 272.2 LIPID PANEL   4. Chronic pain syndrome  G89.4 338.4 COMPLIANCE DRUG SCREEN/PRESCRIPTION MONITORING   5. Lymphedema of both lower extremities  I89.0 457.1 AMB SUPPLY ORDER   6.  Vitamin D deficiency  E55.9 268.9 VITAMIN D, 25 HYDROXY   7. Cervical radiculopathy  M54.12 723.4    8. Degenerative lumbar disc  M51.36 722.52 AMB SUPPLY ORDER   9. Primary osteoarthritis of left hip  M16.12 715.15    10. Moderate major depression (HCC)  F32.1 296.22    11. IGT (impaired glucose tolerance)  R73.02 790.22 HEMOGLOBIN A1C WITH EAG   12. Morbid obesity with BMI of 50.0-59.9, adult ([facility]  E66.01 278.01     Z68.43 V85.43      Diagnoses and all orders for this visit:    1. Welcome to Medicare preventive visit    2. Essential hypertension  BP mildly elevated today at 148/82. Better controlled at last clinic visit. Continue benazepril 40 mg daily.  -     METABOLIC PANEL, COMPREHENSIVE; Future  -     CBC WITH AUTOMATED DIFF; Future    3. Mixed hyperlipidemia  Last lipids on 11/8/19: tot chol 216, . Will recheck before next clinic visit. -     LIPID PANEL; Future    4. Chronic pain syndrome  Due to OA at multiple joints (left hip, knees, lumbar spine, cervical spine), fibromyalgia, and polyneuropathy. This is a chronic problem that is not changed. Per review of available records and patients , there are not sign of overuse, misuse, diversion, or concerning side effects. Today we reviewed: the goals of treatment (improve functionality, quality of life, and pain)  The following changes were made to the patients current treatment plan: none, continue Tramadol. Also continue Lidoderm patches, [employer], and Robaxin as needed. -     COMPLIANCE DRUG SCREEN/PRESCRIPTION MONITORING    5. Lymphedema of both lower extremities  Stable. Continue compression stockings and leg wraps. She will send me information about the name of leg wraps so I can place an order for them. -     AMB SUPPLY ORDER (Lifting recliner)    6. Vitamin D deficiency  -     VITAMIN D, 25 HYDROXY; Future    7. Cervical radiculopathy  See # 4 above. 8. Degenerative lumbar disc  -     AMB SUPPLY ORDER (Lifting recliner)    9.  Primary osteoarthritis of left hip    10. Moderate major depression (HCC)  Controlled on Prozac 40 mg daily. 11. IGT (impaired glucose tolerance)  -     HEMOGLOBIN A1C WITH EAG; Future    12. Morbid obesity with BMI of 50.0-59.9, adult ([address]  Congratulated on 15 lb weight loss. Aim to weigh <300 lbs by next clinic visit; weight 312 lbs today. Greater than 40 mins direct face-to-face time spent with patient. Greater than 50% of time spent on counseling and coordination of care. Follow-up and Dispositions    · Return in about 3 months (around 7/14/2021), or if symptoms worsen or fail to improve, for Chronic pain, HTN; fasting labs 1 week before appointment, call clinic next month to schedule labs. I have discussed the diagnosis with the patient and the intended plan as seen in the above orders. Patient is in agreement. The patient has received an after-visit summary and questions were answered concerning future plans. I have discussed medication side effects and warnings with the patient as well.

## 2021-04-17 LAB
DRUGS UR: NORMAL
SPECIMEN STATUS REPORT, ROLRST: NORMAL

## 2021-04-18 NOTE — PROGRESS NOTES
Message sent to patient by My Chart:  Your urine drug screen showed you are taking Tramadol as expected.     Dr. Julianne Rodriguez

## 2021-04-23 ENCOUNTER — PATIENT MESSAGE (OUTPATIENT)
Dept: INTERNAL MEDICINE CLINIC | Age: 61
End: 2021-04-23

## 2021-04-23 NOTE — TELEPHONE ENCOUNTER
From: Caitlyn Brothers  To: Mary Martines MD  Sent: 4/23/2021 10:11 AM EDT  Subject: Test Results Question    Dr. Matt Keyes,   I received a notice from [facility] not long after i did the urinalysis for drug screening. I noticed on [facility] results that my creatinine level was 153. I thought it may be a flag since i didn't know what normal levels were so i researched and now i'm concerned that possibly the Low carb High protein diet i've been doing has caused that level to be elevated. Once i saw that i've started trying to cut back on protein supplements and drink more water but since i haven't heard from you about a concern I wasn't sure if i should be concerned .  Should i come back in for another test?

## 2021-05-03 ENCOUNTER — PATIENT MESSAGE (OUTPATIENT)
Dept: INTERNAL MEDICINE CLINIC | Age: 61
End: 2021-05-03

## 2021-05-03 DIAGNOSIS — M54.42 CHRONIC LEFT-SIDED LOW BACK PAIN WITH LEFT-SIDED SCIATICA: ICD-10-CM

## 2021-05-03 DIAGNOSIS — G89.29 CHRONIC LEFT-SIDED LOW BACK PAIN WITH LEFT-SIDED SCIATICA: Primary | ICD-10-CM

## 2021-05-03 DIAGNOSIS — G89.29 CHRONIC LEFT-SIDED LOW BACK PAIN WITH LEFT-SIDED SCIATICA: ICD-10-CM

## 2021-05-03 DIAGNOSIS — M54.42 CHRONIC LEFT-SIDED LOW BACK PAIN WITH LEFT-SIDED SCIATICA: Primary | ICD-10-CM

## 2021-05-03 DIAGNOSIS — I10 ESSENTIAL HYPERTENSION: ICD-10-CM

## 2021-05-03 RX ORDER — LIDOCAINE 50 MG/G
PATCH TOPICAL
Qty: 30 EACH | Refills: 3 | Status: SHIPPED | OUTPATIENT
Start: 2021-05-03 | End: 2021-07-14

## 2021-05-03 RX ORDER — BENAZEPRIL HYDROCHLORIDE 40 MG/1
40 TABLET ORAL DAILY
Qty: 30 TAB | Refills: 1 | Status: SHIPPED | OUTPATIENT
Start: 2021-05-03 | End: 2021-12-07

## 2021-05-03 NOTE — TELEPHONE ENCOUNTER
PCP: Romana Perera MD    Last appt: 2/7/2020  Future Appointments   Date Time Provider Keke Jerez   7/14/2021  2:40 PM Romana Perera MD [facility] AMB       Requested Prescriptions     Pending Prescriptions Disp Refills    lidocaine (LIDODERM) 5 % 30 Each 3     Sig: Apply patch to the affected area for 12 hours a day and remove for 12 hours a day.  Dx: M54.42, G89.29    benazepriL (LOTENSIN) 40 mg tablet 30 Tab 11

## 2021-05-04 RX ORDER — TRAMADOL HYDROCHLORIDE 50 MG/1
50 TABLET ORAL
Qty: 45 TAB | Refills: 0 | Status: SHIPPED | OUTPATIENT
Start: 2021-05-04 | End: 2021-05-19

## 2021-05-05 ENCOUNTER — PATIENT MESSAGE (OUTPATIENT)
Dept: INTERNAL MEDICINE CLINIC | Age: 61
End: 2021-05-05

## 2021-05-05 ENCOUNTER — TELEPHONE (OUTPATIENT)
Dept: INTERNAL MEDICINE CLINIC | Age: 61
End: 2021-05-05

## 2021-05-06 NOTE — TELEPHONE ENCOUNTER
Insurance was requesting more information. After consulting with Kathia Tam she informed me that insurance will only cover it for patients who have shingles and 1 other reason. The patient can get Lidocaine 4% OTC.      I left a message for the patient to call back

## 2021-05-11 NOTE — TELEPHONE ENCOUNTER
An appeal has been faxed to the insurance company for the Lidocaine patches and we are awaiting a response.

## 2021-05-17 ENCOUNTER — TRANSCRIBE ORDER (OUTPATIENT)
Dept: SCHEDULING | Age: 61
End: 2021-05-17

## 2021-05-17 DIAGNOSIS — M16.12 PRIMARY OSTEOARTHRITIS OF LEFT HIP: ICD-10-CM

## 2021-05-17 DIAGNOSIS — M25.552 LEFT HIP PAIN: Primary | ICD-10-CM

## 2021-05-17 DIAGNOSIS — Q65.89 DEVELOPMENTAL DYSPLASIA OF HIP: ICD-10-CM

## 2021-06-18 DIAGNOSIS — G89.4 CHRONIC PAIN SYNDROME: Primary | ICD-10-CM

## 2021-06-18 NOTE — TELEPHONE ENCOUNTER
PCP: Shanell Toney MD    Last appt: 4/14/2021  Future Appointments   Date Time Provider Keke Jerez   7/14/2021  2:40 PM Shanell Toney MD [facility] AMB       Requested Prescriptions     Pending Prescriptions Disp Refills    traMADoL (ULTRAM) 50 mg tablet 30 Tablet 0     Sig: Take 1 Tablet by mouth every six (6) hours as needed for Pain for up to 3 days. Max Daily Amount: 200 mg.

## 2021-06-19 RX ORDER — TRAMADOL HYDROCHLORIDE 50 MG/1
50 TABLET ORAL
Qty: 45 TABLET | Refills: 0 | Status: SHIPPED | OUTPATIENT
Start: 2021-06-19 | End: 2021-06-21 | Stop reason: SDUPTHER

## 2021-06-23 ENCOUNTER — PATIENT MESSAGE (OUTPATIENT)
Dept: INTERNAL MEDICINE CLINIC | Age: 61
End: 2021-06-23

## 2021-06-23 ENCOUNTER — TELEPHONE (OUTPATIENT)
Dept: INTERNAL MEDICINE CLINIC | Age: 61
End: 2021-06-23

## 2021-06-23 DIAGNOSIS — M17.0 PRIMARY OSTEOARTHRITIS OF BOTH KNEES: ICD-10-CM

## 2021-06-23 DIAGNOSIS — E66.01 MORBID OBESITY WITH BMI OF 50.0-59.9, ADULT (HCC): ICD-10-CM

## 2021-06-23 DIAGNOSIS — M16.12 PRIMARY OSTEOARTHRITIS OF LEFT HIP: Primary | ICD-10-CM

## 2021-06-23 NOTE — TELEPHONE ENCOUNTER
----- Message from Biju Cooney sent at 6/23/2021 12:47 PM EDT -----  Regarding: Dr. Mace Larchwood: [phone]  General Message/Vendor Calls    Caller's first and last name:Magi Engel [facility]      Reason for call:Helping pt get garments on      Callback required yes/no and why:yes      Best contact number(s):[phone]      Details to clarify the request: Pt hasn't been seen at the Lymphedema clinic since 2019 so they are trying to get her to be seen there but they want to know once they get her seen they want to know who can help pt get garments on.      Biju Cooney

## 2021-06-23 NOTE — TELEPHONE ENCOUNTER
Verified patients name and date of birth. Spoke with Magi. Advised Magi that if patient does not have family/neighbors patient would have to get paid help from nursing agency to put on/remove compression garments. Magi will call back if she needs anything from this office for patient.

## 2021-06-23 NOTE — TELEPHONE ENCOUNTER
----- Message from Urban Cortesbertcecilia Bosch sent at 6/23/2021 10:31 AM EDT -----  Regarding: Prescription Question  Contact: [phone]  Dr. Matt Keyes,  I was contacted by [employer] yesterday to see if i needed any assistance with anything  and go over my coverages . Aryan Brothers [phone]) During our conversation she said she will chk into a lifting chair that i spoke to you about and you gave me the paperwork for . She also said she is ordering a BP monitor. I called her back today and asked her about a raised toliet seat  I am having much harder time getting up from a sitting position and the strain it puts on my hip & back is excruciating. She said that you would  need to send the request to [facility] for a \"Bariatric Raised [place] seat \" with a Diagnosis code . She was going to contact you  i told her i would send you a message through [facility]. Can you do that for me ?

## 2021-06-28 ENCOUNTER — TELEPHONE (OUTPATIENT)
Dept: INTERNAL MEDICINE CLINIC | Age: 61
End: 2021-06-28

## 2021-06-28 NOTE — TELEPHONE ENCOUNTER
Order written for Raised Bariatric Toliet seat, [facility] and [facility] neither carry product. Message left on pt's vm to call insurance company and other suppliers to see who can provide Raised Bariatric Toliet seat.

## 2021-07-14 ENCOUNTER — OFFICE VISIT (OUTPATIENT)
Dept: INTERNAL MEDICINE CLINIC | Age: 61
End: 2021-07-14
Payer: MEDICAID

## 2021-07-14 VITALS
HEART RATE: 94 BPM | RESPIRATION RATE: 16 BRPM | WEIGHT: 293 LBS | BODY MASS INDEX: 50.02 KG/M2 | TEMPERATURE: 98.6 F | HEIGHT: 64 IN | OXYGEN SATURATION: 95 % | SYSTOLIC BLOOD PRESSURE: 142 MMHG | DIASTOLIC BLOOD PRESSURE: 80 MMHG

## 2021-07-14 DIAGNOSIS — G89.4 CHRONIC PAIN SYNDROME: ICD-10-CM

## 2021-07-14 DIAGNOSIS — F32.1 MODERATE MAJOR DEPRESSION (HCC): ICD-10-CM

## 2021-07-14 DIAGNOSIS — F41.1 GENERALIZED ANXIETY DISORDER: ICD-10-CM

## 2021-07-14 DIAGNOSIS — I10 ESSENTIAL HYPERTENSION: ICD-10-CM

## 2021-07-14 DIAGNOSIS — I89.0 LYMPHEDEMA OF BOTH LOWER EXTREMITIES: ICD-10-CM

## 2021-07-14 PROCEDURE — 99215 OFFICE O/P EST HI 40 MIN: CPT | Performed by: INTERNAL MEDICINE

## 2021-07-14 RX ORDER — VENLAFAXINE HYDROCHLORIDE 75 MG/1
CAPSULE, EXTENDED RELEASE ORAL
Qty: 60 CAPSULE | Refills: 0 | Status: SHIPPED | OUTPATIENT
Start: 2021-07-14 | End: 2022-04-19 | Stop reason: SDDI

## 2021-07-14 NOTE — PROGRESS NOTES
CC:   Chief Complaint   Patient presents with    Hypertension    Pain (Chronic)       HISTORY OF PRESENT ILLNESS  Kathy Medina is a 64 y.o. female. Presents for 3 month follow up evaluation. 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 At last clinic visit, labs were ordered (including compliance drug screen), order given for lifting recliner, and instructed to follow up with Lymphedema Clinic at [facility]. None of these were done. Was unable to afford lifting recliner; only lift mechanism covered by Medicare. Unable to get raised bariatric toilet seat. Has difficulty walking due to her weight and arthritis. Has problems doing some ADL's. Is speaking with her insurance about getting a personal care aide. Farhan Boone remodeled her bathroom. Now has a walk-in shower. Was told she needed to lose 70 lbs before having hip replacement surgery. Yesterday received CBD gummies and CBD vape device by delivery. Ordered by a certified prescribing physician in [address] [place] [address]; did telehealth visit. Complains of feeling depressed and anxious. Weaned herself off Prozac because it was not helping her symptoms. Would like to try another medication. Says venlafaxine helped in the past but insurance would not pay for it. Denies SI or HI. Chronic Pain Review  Julienne Schmitt RTC today to follow up on chronic pain.  We discussed her left hip pain, chronic low back pain, and pain at both knees.  Significant changes since last visit: none.  She is  able to do her normal daily activities.  She reports the following adverse side effects: none.  Pain worse with standing or walking. Ambulates with a cane. Uses a wheelchair sometimes at home.   Takes Tramadol 50 mg 1 tab 1-2 times a day as needed for pain.  Lyrica did not help her symptoms.      Least pain over the last week has been 5/10. Worst pain over the last week has been 10/10. Opioid Risk Tool Reviewed: YES  Aberrant behaviors: None.    Urine Drug Screen: due for this  Controlled substance agreement on file: YES.      reviewed:yes  Pill count is consistent with her prescription: yes (45 tabs prescribed on 1/29/21, has 14 tablets left)  Concomitant use of a benzodiazepine: no  Active daily MME is 15 mg. Naloxone prescription not warranted. ROS  A complete review of systems was performed and is negative except for those mentioned in the HPI. Patient Active Problem List   Diagnosis Code    Essential hypertension I10    Idiopathic peripheral neuropathy G60.9    Osteopenia M85.80    Fibromyalgia M79.7    Generalized anxiety disorder F41.1    Primary osteoarthritis of left hip M16.12    Primary osteoarthritis of both knees M17.0    Degenerative lumbar disc M51.36    Morbid obesity with BMI of 50.0-59.9, adult (HCC) E66.01, Z68.43    Moderate major depression ([address] F32.1    Cervical radiculopathy M54.12    Lymphedema of both lower extremities I89.0    Mixed hyperlipidemia E78.2    Vitamin D deficiency E55.9    Chronic pain syndrome G89.4    Chronic use of opiate drug for therapeutic purpose Z79.891     Past Medical History:   Diagnosis Date    Chronic venous hypertension (idiopathic) with ulcer and inflammation of bilateral lower extremity (CODE) ([address] 12/6/2018    Depression with anxiety     Fibromyalgia     Hypertension     Lumbar herniated disc 7/17/2015    Morbid obesity ([address]     Osteoarthritis of knee 2016    R knee X-ray 7/5/16 ([facility]) mod to severe tibiofemoral arthritis, mod patellofemoral arthritis    Osteoarthritis of left hip 2015    Osteopenia      No Known Allergies    Current Outpatient Medications   Medication Sig Dispense Refill    OTHER Tumeric      benazepriL (LOTENSIN) 40 mg tablet Take 1 Tab by mouth daily. 30 Tab 1    COLLAGEN by Does Not Apply route.       ascorbic acid (VITAMIN C PO) Take 1 Tab by mouth daily. Takes 1 tab (1400 mg) daily.  FLUoxetine (PROzac) 40 mg capsule TAKE 1 CAPSULE BY MOUTH EVERY DAY 30 Cap 11    zinc 50 mg tab tablet Take 50 mg by mouth daily.  methyl salicylate/menthol (CECIL HERRERA EX) by Apply Externally route.  methocarbamol (ROBAXIN) 750 mg tablet Take 1 Tab by mouth three (3) times daily as needed (muscle spasm/tightness). 90 Tab 1    cholecalciferol, VITAMIN D3, (VITAMIN D3) 5,000 unit tab tablet Take 10,000 Units by mouth daily.  naproxen sodium (ALEVE) 220 mg cap Take  by mouth. PHYSICAL EXAM  Visit Vitals  BP (!) 142/80 (BP 1 Location: Left upper arm, BP Patient Position: Sitting, BP Cuff Size: Large adult)   Pulse 94   Temp 98.6 °F (37 °C) (Oral)   Resp 16   Ht 5' 4\" (1.626 m)   Wt 310 lb (140.6 kg)   SpO2 95%   BMI 53.21 kg/m²       General: Morbidly obese, no distress. In wheelchair. HEENT:  Head normocephalic/atraumatic, no scleral icterus  Neck: Supple. No lymphadenopathy, or thyromegaly. Lungs:  Clear to ausculation bilaterally. Good air movement. Heart:  Regular rate and rhythm, normal S1 and S2, no murmur, gallop, or rub  Extremities: No clubbing, cyanosis. Wearing compression wrap on LLE. 1-2+ pitting edema at bilateral LE's. Back: Tenderness at bilateral lumbar paravertebral areas  Neurological: Alert and oriented. Unchanged neurological exam.  Psychiatric: Tearful. Behavior is normal.         ASSESSMENT AND PLAN    ICD-10-CM ICD-9-CM    1. Essential hypertension  I10 401.9    2. Chronic pain syndrome  G89.4 338.4    3. Moderate major depression (HCC)  F32.1 296.22 venlafaxine-SR (EFFEXOR-XR) 75 mg capsule   4. Generalized anxiety disorder  F41.1 300.02 venlafaxine-SR (EFFEXOR-XR) 75 mg capsule   5. Lymphedema of both lower extremities  I89.0 457.1      Diagnoses and all orders for this visit:    1. Essential hypertension  BP mildly elevated; may be due to pain. Continue benazepril.   Plan to add HCTZ 12.5 mg daily with next refill. 2. Chronic pain syndrome  Due to OA at multiple joints (left hip, knees, lumbar spine, cervical spine), fibromyalgia, and polyneuropathy. This is a chronic problem that is not changed.  Per review of available records and patients , there are not sign of overuse, misuse, diversion, or concerning side effects. Today we reviewed: the goals of treatment (improve functionality, quality of life, and pain)  The following changes were made to the patients current treatment plan: none, continue Tramadol.  Also continue Lidoderm patches, [employer], and Robaxin as needed. 3. Moderate major depression (HCC)  -     Start venlafaxine-SR [facility] 75 mg capsule; Take 1 cap by mouth daily for 7 days then increase to 2 caps by mouth daily. 4. Generalized anxiety disorder  -     Start venlafaxine-SR [facility] 75 mg capsule; Take 1 cap by mouth daily for 7 days then increase to 2 caps by mouth daily. 5. Lymphedema of both lower extremities      Time Spent: 45 mins reviewing records, face-to-face time, placing orders, and documenting note. Follow-up and Dispositions    · Return in about 3 months (around 10/14/2021), or if symptoms worsen or fail to improve, for Chronic pain, HTN, depression. I have discussed the diagnosis with the patient and the intended plan as seen in the above orders. Patient is in agreement. The patient has received an after-visit summary and questions were answered concerning future plans. I have discussed medication side effects and warnings with the patient as well.

## 2021-07-14 NOTE — PROGRESS NOTES
Identified pt with two pt identifiers. Reviewed record in preparation for visit and have obtained necessary documentation. All patient medications has been reviewed. Chief Complaint   Patient presents with    Hypertension    Pain (Chronic)     Additional information about chief complaint:    Visit Vitals  BP (!) 142/80 (BP 1 Location: Left upper arm, BP Patient Position: Sitting, BP Cuff Size: Large adult)   Pulse 94   Temp 98.6 °F (37 °C) (Oral)   Resp 16   Ht 5' 4\" (1.626 m)   Wt 310 lb (140.6 kg)   SpO2 95%   BMI 53.21 kg/m²       Health Maintenance Due   Topic    Shingrix Vaccine Age 50> (1 of 2)    PAP AKA CERVICAL CYTOLOGY     Breast Cancer Screen Mammogram     Colorectal Cancer Screening Combo        1. Have you been to the ER, urgent care clinic since your last visit? Hospitalized since your last visit? No   2. Have you seen or consulted any other health care providers outside of the [address] [place] [address] since your last visit? Include any pap smears or colon screening. No     bdg

## 2021-07-14 NOTE — PATIENT INSTRUCTIONS
Recovering From Depression: Care Instructions  Your Care Instructions     Taking good care of yourself is important as you recover from depression. In time, your symptoms will fade as your treatment takes hold. Do not give up. Instead, focus your energy on getting better. Your mood will improve. It just takes some time. Focus on things that can help you feel better, such as being with friends and family, eating well, and getting enough rest. But take things slowly. Do not do too much too soon. You will begin to feel better gradually. Follow-up care is a key part of your treatment and safety. Be sure to make and go to all appointments, and call your doctor if you are having problems. It's also a good idea to know your test results and keep a list of the medicines you take. How can you care for yourself at home? Be realistic  · If you have a large task to do, break it up into smaller steps you can handle, and just do what you can. · You may want to put off important decisions until your depression has lifted. If you have plans that will have a major impact on your life, such as marriage, divorce, or a job change, try to wait a bit. Talk it over with friends and loved ones who can help you look at the overall picture first.  · Reaching out to people for help is important. Do not isolate yourself. Let your family and friends help you. Find someone you can trust and confide in, and talk to that person. · Be patient, and be kind to yourself. Remember that depression is not your fault and is not something you can overcome with willpower alone. Treatment is important for depression, just like for any other illness. Feeling better takes time, and your mood will improve little by little. Stay active  · Stay busy and get outside. Take a walk, or try some other light exercise. · Talk with your doctor about an exercise program. Exercise can help with mild depression. · Go to a movie or concert.  Take part in a Denominational activity or other social gathering. Go to a [employer] game. · Ask a friend to have dinner with you. Take care of yourself  · Eat a balanced diet with plenty of fresh fruits and vegetables, whole grains, and lean protein. If you have lost your appetite, eat small snacks rather than large meals. · Avoid using illegal drugs or marijuana and drinking alcohol. Do not take medicines that have not been prescribed for you. They may interfere with medicines you may be taking for depression, or they may make your depression worse. · Take your medicines exactly as they are prescribed. You may start to feel better within 1 to 3 weeks of taking antidepressant medicine. But it can take as many as 6 to 8 weeks to see more improvement. If you have questions or concerns about your medicines, or if you do not notice any improvement by 3 weeks, talk to your doctor. · Continue to take your medicine after your symptoms improve. Taking your medicine for at least 6 months after you feel better can help keep you from getting depressed again. If this isn't the first time you have been depressed, your doctor may recommend you to take medicine even longer. · If you have any side effects from your medicine, tell your doctor. Many side effects are mild and will go away on their own after you have been taking the medicine for a few weeks. Some may last longer. Talk to your doctor if side effects are bothering you too much. You might be able to try a different medicine. · Continue counseling. It may help prevent depression from returning, especially if you've had multiple episodes of depression. Talk with your counselor if you are having a hard time attending your sessions or you think the sessions aren't working. Don't just stop going. · Get enough sleep. Talk to your doctor if you are having problems sleeping. · Avoid sleeping pills unless they are prescribed by the doctor treating your depression.  Sleeping pills may make you groggy during the day, and they may interact with other medicine you are taking. · If you have any other illnesses, such as diabetes, heart disease, or high blood pressure, make sure to continue with your treatment. Tell your doctor about all of the medicines you take, including those with or without a prescription. · If you or someone you know talks about suicide, self-harm, or feeling hopeless, get help right away. Call the [facility] at [phone] ([phone]) or text HOME to 202888 to access the Crisis Text Line. Consider saving these numbers in your phone. When should you call for help? Call 911 anytime you think you may need emergency care. For example, call if:    · You feel like hurting yourself or someone else.     · Someone you know has depression and is about to attempt or is attempting suicide. Call your doctor now or seek immediate medical care if:    · You hear voices.     · Someone you know has depression and:  ? Starts to give away his or her possessions. ? Uses illegal drugs or drinks alcohol heavily. ? Talks or writes about death, including writing suicide notes or talking about guns, knives, or pills. ? Starts to spend a lot of time alone. ? Acts very aggressively or suddenly appears calm. Watch closely for changes in your health, and be sure to contact your doctor if:    · You do not get better as expected. Where can you learn more? Go to http://www.gray.com/  Enter N529 in the search box to learn more about \"Recovering From Depression: Care Instructions. \"  Current as of: September 23, 2020               Content Version: 12.8  © 9999-2070 Healthwise, Incorporated. Care instructions adapted under license by [employer] (which disclaims liability or warranty for this information).  If you have questions about a medical condition or this instruction, always ask your healthcare professional. Cecil Grewal disclaims any warranty or liability for your use of this information.

## 2021-08-05 ENCOUNTER — PATIENT MESSAGE (OUTPATIENT)
Dept: INTERNAL MEDICINE CLINIC | Age: 61
End: 2021-08-05

## 2021-08-05 DIAGNOSIS — G89.4 CHRONIC PAIN SYNDROME: ICD-10-CM

## 2021-08-05 RX ORDER — TRAMADOL HYDROCHLORIDE 50 MG/1
50 TABLET ORAL
Qty: 45 TABLET | Refills: 0 | Status: SHIPPED | OUTPATIENT
Start: 2021-08-05 | End: 2021-08-20

## 2021-08-05 NOTE — TELEPHONE ENCOUNTER
From: Kasandra Juan  To: Evan Steele MD  Sent: 8/5/2021 12:40 PM EDT  Subject: Prescription Question    Dr. Tammie Chase,   I need a refill on my tramadol. (last filled on 6/21/21) I get #45  I am going to still need the tramadol for pain relief as i've found the medical cannabis does not effectively help me with my pain it only seems to help with some relaxation . I wont be using the cannabis on a daily basis due to cost and also i don't feel comfortable driving after using it as i feel TOO relaxed . I definitely need the Tramadol for when I leave my home . I have been taking 1 a day as i try to not take them until the pain has reached a level that i cant bear but ultimately i end up having to take at least 1 a day to get a little relief. I get so depressed that i cant do anything without pain ! Has your Lab reopened yet?

## 2021-08-27 LAB — HBA1C MFR BLD HPLC: 5.6 %

## 2021-09-20 DIAGNOSIS — G60.9 IDIOPATHIC PERIPHERAL NEUROPATHY: Primary | ICD-10-CM

## 2021-09-20 RX ORDER — TRAMADOL HYDROCHLORIDE 50 MG/1
50 TABLET ORAL DAILY
Qty: 30 TABLET | Refills: 0 | Status: SHIPPED | OUTPATIENT
Start: 2021-09-20 | End: 2021-10-19

## 2021-09-20 RX ORDER — TRAMADOL HYDROCHLORIDE 50 MG/1
50 TABLET ORAL DAILY
COMMUNITY
Start: 2021-05-04 | End: 2021-09-20 | Stop reason: SDUPTHER

## 2021-09-20 NOTE — TELEPHONE ENCOUNTER
PCP: Dejuan Richard MD    Last appt: 7/14/2021  Future Appointments   Date Time Provider Keke Jerez   10/15/2021  2:40 PM Dejuan Richard MD [facility] AMB       Requested Prescriptions     Pending Prescriptions Disp Refills    traMADoL (ULTRAM) 50 mg tablet 30 Tablet 0     Sig: Take 1 Tablet by mouth daily for 30 days. Max Daily Amount: 50 mg.

## 2021-09-27 ENCOUNTER — TELEPHONE (OUTPATIENT)
Dept: INTERNAL MEDICINE CLINIC | Age: 61
End: 2021-09-27

## 2021-09-27 ENCOUNTER — PATIENT MESSAGE (OUTPATIENT)
Dept: INTERNAL MEDICINE CLINIC | Age: 61
End: 2021-09-27

## 2021-09-27 DIAGNOSIS — I89.0 LYMPHEDEMA OF BOTH LOWER EXTREMITIES: Primary | ICD-10-CM

## 2021-09-27 NOTE — TELEPHONE ENCOUNTER
From: Crorie Given  To: Elton Ferrer MD  Sent: 9/27/2021 3:02 PM EDT  Subject: Referral Request    Dr. Benny Willis, I spoke with Lymphedema and [address] [place] [address] [phone] today and he asked for a referral to be able to help me with my Lymphedema. I have not been able to get to [address] [facility] for probably 2 yrs since Covid and then my mobility issues. I believe this place is the same place that the Lymphedema clinic referred me to where they could come out to my home to help me . He said my name sounded familiar. He did say that it was out of their normal servicing area but that he would see what he could do. Could you please fax a referral with demographics : [facility]uate and treat for Lymphedema Fax [phone] . He said if he has to see someone in [place] or near there he may be able to work me in .  Thanks

## 2021-10-07 ENCOUNTER — DOCUMENTATION ONLY (OUTPATIENT)
Dept: INTERNAL MEDICINE CLINIC | Age: 61
End: 2021-10-07

## 2021-10-15 ENCOUNTER — OFFICE VISIT (OUTPATIENT)
Dept: INTERNAL MEDICINE CLINIC | Age: 61
End: 2021-10-15
Payer: MEDICAID

## 2021-10-15 DIAGNOSIS — I10 ESSENTIAL HYPERTENSION: Primary | ICD-10-CM

## 2021-10-15 DIAGNOSIS — E78.2 MIXED HYPERLIPIDEMIA: ICD-10-CM

## 2021-10-15 DIAGNOSIS — I89.0 LYMPHEDEMA OF BOTH LOWER EXTREMITIES: ICD-10-CM

## 2021-10-15 DIAGNOSIS — F32.1 MODERATE MAJOR DEPRESSION (HCC): ICD-10-CM

## 2021-10-15 DIAGNOSIS — G89.4 CHRONIC PAIN SYNDROME: ICD-10-CM

## 2021-10-15 PROCEDURE — 99215 OFFICE O/P EST HI 40 MIN: CPT | Performed by: INTERNAL MEDICINE

## 2021-10-15 NOTE — PROGRESS NOTES
Identified pt with two pt identifiers. Reviewed record in preparation for visit and have obtained necessary documentation. All patient medications has been reviewed. Chief Complaint   Patient presents with    Hypertension    Pain (Chronic)    Depression     Additional information about chief complaint:    Visit Vitals  BP (!) 142/86 (BP 1 Location: Left upper arm, BP Patient Position: Sitting, BP Cuff Size: Large adult)   Pulse 92   Temp 99.1 °F (37.3 °C) (Oral)   Resp 16   Ht 5' 4\" (1.626 m)   Wt 315 lb (142.9 kg)   SpO2 95%   BMI 54.07 kg/m²       Health Maintenance Due   Topic    Shingrix Vaccine Age 49> (1 of 2)    Cervical cancer screen     Breast Cancer Screen Mammogram     Colorectal Cancer Screening Combo     Flu Vaccine (1)       1. Have you been to the ER, urgent care clinic since your last visit? Hospitalized since your last visit? No   2. Have you seen or consulted any other health care providers outside of the [address] [place] [address] since your last visit? Include any pap smears or colon screening.   No   bdg

## 2021-10-15 NOTE — PATIENT INSTRUCTIONS
High Cholesterol: Care Instructions  Overview     Cholesterol is a type of fat in your blood. It is needed for many body functions, such as making new cells. Cholesterol is made by your body. It also comes from food you eat. High cholesterol means that you have too much of the fat in your blood. This raises your risk of a heart attack and stroke. LDL and HDL are part of your total cholesterol. LDL is the \"bad\" cholesterol. High LDL can raise your risk for coronary artery disease, heart attack, and stroke. HDL is the \"good\" cholesterol. It helps clear bad cholesterol from the body. High HDL is linked with a lower risk of coronary artery disease, heart attack, and stroke. Your cholesterol levels help your doctor find out your risk for having a heart attack or stroke. You and your doctor can talk about whether you need to lower your risk and what treatment is best for you. Treatment options include a heart-healthy lifestyle and medicine. Both options can help lower your cholesterol and your risk. The way you choose to lower your risk will depend on how high your risk is for heart attack and stroke. It will also depend on how you feel about taking medicines. Follow-up care is a key part of your treatment and safety. Be sure to make and go to all appointments, and call your doctor if you are having problems. It's also a good idea to know your test results and keep a list of the medicines you take. How can you care for yourself at home? · Eat heart-healthy foods. ? Eat fruits, vegetables, whole grains, beans, and other high-fiber foods. ? Eat lean proteins, such as seafood, lean meats, beans, nuts, and soy products. ? Eat healthy fats, such as canola and olive oil. ? Choose foods that are low in saturated fat. ? Limit sodium and alcohol. ? Limit drinks and foods with added sugar. · Be physically active. Try to do moderate activity at least 2½ hours a week.  Or try to do vigorous activity at least 1¼ hours a week. You may want to walk or try other activities, such as running, swimming, cycling, or playing tennis or team sports. · Stay at a healthy weight or lose weight by making the changes in eating and physical activity listed above. Losing just a small amount of weight, even 5 to 10 pounds, can help reduce your risk for having a heart attack or stroke. · Do not smoke. · Manage other health problems. These include diabetes and high blood pressure. If you think you may have a problem with alcohol or drug use, talk to your doctor. · If you take medicine, take it exactly as prescribed. Call your doctor if you think you are having a problem with your medicine. · Check with your doctor or pharmacist before you use any other medicines, including over-the-counter medicines. Make sure your doctor knows all of the medicines, vitamins, herbal products, and supplements you take. Taking some medicines together can cause problems. When should you call for help? Watch closely for changes in your health, and be sure to contact your doctor if:    · You need help making lifestyle changes.     · You have questions about your medicine. Where can you learn more? Go to http://www.gray.com/  Enter H644 in the search box to learn more about \"High Cholesterol: Care Instructions. \"  Current as of: April 29, 2021               Content Version: 13.0  © 2006-2021 Healthwise, Incorporated. Care instructions adapted under license by [employer] (which disclaims liability or warranty for this information). If you have questions about a medical condition or this instruction, always ask your healthcare professional. [place] [address] any warranty or liability for your use of this information.

## 2021-10-15 NOTE — PROGRESS NOTES
CC:   Chief Complaint   Patient presents with    Hypertension    Pain (Chronic)    Depression       HISTORY OF PRESENT ILLNESS  Zayda Steinberg is a 63 y.o. female. Presents for 3 month follow up evaluation. 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       No new complaints. Swelling in legs unchanged. Was not able to get home health for lymphedema. Trying to lose weight but gained some weight since last clinic visit. Was told she needed to lose 70 lbs before having hip replacement surgery. Still depressed but mood a little better since starting venlafaxine. Plans to have mammogram at an imaging center in [facility]. Flu vaccine: declined    Chronic Pain Review  Marina Schmitt RTC today to follow up on chronic pain. We discussed her left hip pain, chronic low back pain, and pain at both knees. Significant changes since last visit: none. She is  able to do her normal daily activities. She reports the following adverse side effects: none. Pain worse with standing or walking. Ambulates with a cane. Uses a wheelchair sometimes at home. Takes Tramadol 50 mg 1 tab 1-2 times a day as needed for pain. Lyrica did not help her symptoms.      Least pain over the last week has been 5/10. Worst pain over the last week has been 10/10. Opioid Risk Tool Reviewed: YES  Aberrant behaviors: None.    Urine Drug Screen: due for this  Controlled substance agreement on file: YES.      reviewed:yes  Pill count is consistent with her prescription: yes  Concomitant use of a benzodiazepine: no  Active daily MME is 15 mg. Naloxone prescription not warranted. ROS  A complete review of systems was performed and is negative except for those mentioned in the HPI.     Patient Active Problem List   Diagnosis Code    Essential hypertension I10    Idiopathic peripheral neuropathy G60.9    Osteopenia M85.80    Fibromyalgia M79.7    Generalized anxiety disorder F41.1    Primary osteoarthritis of left hip M16.12    Primary osteoarthritis of both knees M17.0    Degenerative lumbar disc M51.36    Morbid obesity with BMI of 50.0-59.9, adult (HCC) E66.01, Z68.43    Moderate major depression (HCC) F32.1    Cervical radiculopathy M54.12    Lymphedema of both lower extremities I89.0    Mixed hyperlipidemia E78.2    Vitamin D deficiency E55.9    Chronic pain syndrome G89.4    Chronic use of opiate drug for therapeutic purpose Z79.891     Past Medical History:   Diagnosis Date    Chronic venous hypertension (idiopathic) with ulcer and inflammation of bilateral lower extremity (CODE) ([facility] [address] 12/6/2018    Depression with anxiety     Fibromyalgia     Hypertension     Lumbar herniated disc 7/17/2015    Morbid obesity (HCC)     Osteoarthritis of knee 2016    R knee X-ray 7/5/16 ([facility]) mod to severe tibiofemoral arthritis, mod patellofemoral arthritis    Osteoarthritis of left hip 2015    Osteopenia      No Known Allergies    Current Outpatient Medications   Medication Sig Dispense Refill    traMADoL (ULTRAM) 50 mg tablet Take 1 Tablet by mouth daily for 30 days. Max Daily Amount: 50 mg. 30 Tablet 0    OTHER Tumeric      venlafaxine-SR (EFFEXOR-XR) 75 mg capsule Take 1 cap by mouth daily for 7 days then increase to 2 caps by mouth daily. 60 Capsule 0    benazepriL (LOTENSIN) 40 mg tablet Take 1 Tab by mouth daily. 30 Tab 1    COLLAGEN by Does Not Apply route.  ascorbic acid (VITAMIN C PO) Take 1 Tab by mouth daily. Takes 1 tab (1400 mg) daily.  zinc 50 mg tab tablet Take 50 mg by mouth daily.  methyl salicylate/menthol (CECIL HERRERA EX) by Apply Externally route.  methocarbamol (ROBAXIN) 750 mg tablet Take 1 Tab by mouth three (3) times daily as needed (muscle spasm/tightness).  90 Tab 1    cholecalciferol, VITAMIN D3, (VITAMIN D3) 5,000 unit tab tablet Take 10,000 Units by mouth daily.      naproxen sodium (ALEVE) 220 mg cap Take  by mouth. PHYSICAL EXAM  Visit Vitals  /76 (BP 1 Location: Left upper arm, BP Patient Position: Sitting, BP Cuff Size: Large adult long)   Pulse 92   Temp 99.1 °F (37.3 °C) (Oral)   Resp 16   Ht 5' 4\" (1.626 m)   Wt 315 lb (142.9 kg)   SpO2 95%   BMI 54.07 kg/m²       General: Morbidly obese, no distress. In wheelchair. HEENT:  Head normocephalic/atraumatic, no scleral icterus  Neck: Supple. No lymphadenopathy, or thyromegaly. Lungs:  Clear to ausculation bilaterally. Good air movement. Heart:  Regular rate and rhythm, normal S1 and S2, no murmur, gallop, or rub  Extremities: No clubbing, cyanosis. Wearing compression wrap on LLE. 1-2+ pitting edema at bilateral LE's. Back: Tenderness at bilateral lumbar paravertebral areas  Neurological: Alert and oriented. Unchanged neurological exam.  Psychiatric: Normal mood and affect. Behavior is normal.     Results for orders placed or performed in visit on 10/07/21   AMB EXT HGBA1C   Result Value Ref Range    Hemoglobin A1c, External 5.6 %         ASSESSMENT AND PLAN    ICD-10-CM ICD-9-CM    1. Essential hypertension  I10 401.9    2. Lymphedema of both lower extremities  I89.0 457.1    3. Chronic pain syndrome  G89.4 338.4    4. Mixed hyperlipidemia  E78.2 272.2    5. Moderate major depression (HCC)  F32.1 296.22      Discussed lab results from labs done at [facility] last month. Your labs done at [facility] showed normal kidney and liver tests, blood counts, diabetes screening test, and vitamin D. Your total cholesterol was a little high at 210. Normal is less than 200. To lower cholesterol, work on diet, exercise, and weight loss. For diet, increase fruits, vegetables, and low-fat dairy products (such as low-fat yogurt, 1% or skim milk), and decrease fried foods, fast foods, beef, partida, sausage, hot dogs, and pork. Diagnoses and all orders for this visit:    1. Essential hypertension  Controlled. Continue benazepril. 2. Lymphedema of both lower extremities  Recommended she check [facility] and [facility] to see if they have lymphedema clinics since she says it is hard for her to go the [address] due to a long walk required. 3. Chronic pain syndrome  Due to OA at multiple joints (left hip, knees, lumbar spine, cervical spine), fibromyalgia, and polyneuropathy. This is a chronic problem that is not changed. Per review of available records and patients , there are not sign of overuse, misuse, diversion, or concerning side effects. Today we reviewed: the risk of overdose, addiction, and dependency  The following changes were made to the patients current treatment plan: none, Tramadol will be refilled when due. 4. Mixed hyperlipidemia  Mild hyperlipidemia. Counseled on low-fat diet. 5. Moderate major depression ([address]  Better controlled. Continue venlafaxine  mg daily. 45 mins on medical record review, exam, history, discussing problems and plan, counseling, placing orders, and documentation. Follow-up and Dispositions    · Return in about 3 months (around 1/15/2022), or if symptoms worsen or fail to improve, for HTN, chronic pain. I have discussed the diagnosis with the patient and the intended plan as seen in the above orders. Patient is in agreement. The patient has received an after-visit summary and questions were answered concerning future plans. I have discussed medication side effects and warnings with the patient as well.

## 2021-10-16 VITALS
TEMPERATURE: 99.1 F | OXYGEN SATURATION: 95 % | SYSTOLIC BLOOD PRESSURE: 124 MMHG | HEART RATE: 92 BPM | RESPIRATION RATE: 16 BRPM | HEIGHT: 64 IN | WEIGHT: 293 LBS | BODY MASS INDEX: 50.02 KG/M2 | DIASTOLIC BLOOD PRESSURE: 76 MMHG

## 2021-10-18 ENCOUNTER — PATIENT MESSAGE (OUTPATIENT)
Dept: INTERNAL MEDICINE CLINIC | Age: 61
End: 2021-10-18

## 2021-10-19 DIAGNOSIS — G60.9 IDIOPATHIC PERIPHERAL NEUROPATHY: ICD-10-CM

## 2021-10-19 DIAGNOSIS — N30.00 ACUTE CYSTITIS WITHOUT HEMATURIA: ICD-10-CM

## 2021-10-19 DIAGNOSIS — M16.12 PRIMARY OSTEOARTHRITIS OF LEFT HIP: Primary | ICD-10-CM

## 2021-10-19 RX ORDER — TRAMADOL HYDROCHLORIDE 50 MG/1
50 TABLET ORAL DAILY
Qty: 30 TABLET | Refills: 0 | Status: CANCELLED | OUTPATIENT
Start: 2021-10-19 | End: 2021-11-18

## 2021-10-19 RX ORDER — TRAMADOL HYDROCHLORIDE 50 MG/1
50 TABLET ORAL
Qty: 45 TABLET | Refills: 0 | Status: SHIPPED | OUTPATIENT
Start: 2021-10-19 | End: 2021-12-07 | Stop reason: SDUPTHER

## 2021-10-19 RX ORDER — NITROFURANTOIN 25; 75 MG/1; MG/1
100 CAPSULE ORAL 2 TIMES DAILY
Qty: 10 CAPSULE | Refills: 0 | Status: SHIPPED | OUTPATIENT
Start: 2021-10-19 | End: 2021-10-24

## 2021-10-19 NOTE — TELEPHONE ENCOUNTER
PCP: Jasvir Oreilly MD     Last appt: 10/15/2021   Future Appointments   Date Time Provider Keke Jerez   1/18/2022  2:40 PM Jasvir Oreilly MD [facility] AMB        Requested Prescriptions     Pending Prescriptions Disp Refills    traMADoL (ULTRAM) 50 mg tablet 30 Tablet 0     Sig: Take 1 Tablet by mouth daily for 30 days. Max Daily Amount: 50 mg.

## 2021-10-19 NOTE — TELEPHONE ENCOUNTER
From: Nancy Watkins  To: Cris Torres MD  Sent: 10/18/2021 5:33 PM EDT  Subject: Update Medical Information    Dr. Montes Sand sure your probably gone for the day since its 5:30 pm but i needed to see if you can prescribe something for me for a UTI . I woke this am with vary bad lower back pain (more than usual) it has eased up some with heat, tramadol & aleve but i remembered i have a UTI test and just used it and it changed to the purplish color indicating that i have a uti. [facility], I am having a harder time walking (if thats even possible) due to the pain in my lower back . PLease let me know .  Thanks

## 2021-12-07 ENCOUNTER — PATIENT MESSAGE (OUTPATIENT)
Dept: INTERNAL MEDICINE CLINIC | Age: 61
End: 2021-12-07

## 2021-12-07 DIAGNOSIS — G60.9 IDIOPATHIC PERIPHERAL NEUROPATHY: ICD-10-CM

## 2021-12-07 DIAGNOSIS — M16.12 PRIMARY OSTEOARTHRITIS OF LEFT HIP: ICD-10-CM

## 2021-12-07 NOTE — TELEPHONE ENCOUNTER
PCP: Abena Romero MD     Last appt: 10/15/2021   Future Appointments   Date Time Provider Keke Jerez   1/18/2022  2:20 PM Abena Romero MD [facility] AMB        Requested Prescriptions     Pending Prescriptions Disp Refills    traMADoL (ULTRAM) 50 mg tablet 45 Tablet 0     Sig: Take 1 Tablet by mouth every eight (8) hours as needed for Pain for up to 15 days. Max Daily Amount: 150 mg.

## 2021-12-07 NOTE — TELEPHONE ENCOUNTER
From: Elton Conde  To: Jackson Denver, MD  Sent: 12/7/2021 10:20 AM EST  Subject: Refill for my Tramadol    Hello Dr. Sophie Alonzo, it is time to refill my Tramadol. Would you [facility]?  Thank you

## 2021-12-08 RX ORDER — TRAMADOL HYDROCHLORIDE 50 MG/1
50 TABLET ORAL
Qty: 45 TABLET | Refills: 0 | Status: SHIPPED | OUTPATIENT
Start: 2021-12-08 | End: 2021-12-23

## 2022-01-18 ENCOUNTER — VIRTUAL VISIT (OUTPATIENT)
Dept: INTERNAL MEDICINE CLINIC | Age: 62
End: 2022-01-18
Payer: MEDICAID

## 2022-01-18 DIAGNOSIS — I89.0 LYMPHEDEMA OF BOTH LOWER EXTREMITIES: ICD-10-CM

## 2022-01-18 DIAGNOSIS — M16.12 PRIMARY OSTEOARTHRITIS OF LEFT HIP: Primary | ICD-10-CM

## 2022-01-18 DIAGNOSIS — Z12.11 SCREENING FOR COLON CANCER: ICD-10-CM

## 2022-01-18 DIAGNOSIS — E66.01 MORBID OBESITY WITH BMI OF 50.0-59.9, ADULT (HCC): ICD-10-CM

## 2022-01-18 DIAGNOSIS — F32.1 MODERATE MAJOR DEPRESSION (HCC): ICD-10-CM

## 2022-01-18 PROCEDURE — 99214 OFFICE O/P EST MOD 30 MIN: CPT | Performed by: INTERNAL MEDICINE

## 2022-01-18 RX ORDER — TRAMADOL HYDROCHLORIDE 50 MG/1
50 TABLET ORAL
COMMUNITY
End: 2022-01-21

## 2022-01-18 NOTE — PROGRESS NOTES
Lilian Gannon  Identified pt with two pt identifiers(name and ). Chief Complaint   Patient presents with    Hypertension    Pain (Chronic)       Reviewed record In preparation for visit and have obtained necessary documentation. 1. Have you been to the ER, urgent care clinic or hospitalized since your last visit? No     2. Have you seen or consulted any other health care providers outside of the [address] [place] [address] since your last visit? Include any pap smears or colon screening. No    Vitals reviewed with provider. Health Maintenance reviewed:     Health Maintenance Due   Topic    Shingrix Vaccine Age 49> (1 of 2)    Cervical cancer screen     Breast Cancer Screen Mammogram     Colorectal Cancer Screening Combo           Wt Readings from Last 3 Encounters:   10/15/21 315 lb (142.9 kg)   21 310 lb (140.6 kg)   21 312 lb 3.2 oz (141.6 kg)        Temp Readings from Last 3 Encounters:   10/15/21 99.1 °F (37.3 °C) (Oral)   21 98.6 °F (37 °C) (Oral)   21 98.4 °F (36.9 °C) (Oral)        BP Readings from Last 3 Encounters:   10/15/21 124/76   21 (!) 142/80   21 (!) 148/82        Pulse Readings from Last 3 Encounters:   10/15/21 92   21 94   21 87      There were no vitals filed for this visit.        Learning Assessment:   :       Learning Assessment 2020 2015 3/26/2014   PRIMARY LEARNER Patient Patient Patient   HIGHEST LEVEL OF EDUCATION - PRIMARY LEARNER  - GRADUATED HIGH SCHOOL OR GED GRADUATED HIGH SCHOOL OR GED   BARRIERS PRIMARY LEARNER - NONE NONE   CO-LEARNER CAREGIVER - No No   PRIMARY LANGUAGE ENGLISH ENGLISH ENGLISH   LEARNER PREFERENCE PRIMARY OTHER (COMMENT) LISTENING VIDEOS   ANSWERED BY self patient patient   RELATIONSHIP SELF SELF SELF        Depression Screening:   :       3 most recent PHQ Screens 2022   PHQ Not Done -   Little interest or pleasure in doing things Nearly every day   Feeling down, depressed, irritable, or hopeless Nearly every day   Total Score PHQ 2 6   Trouble falling or staying asleep, or sleeping too much Nearly every day   Feeling tired or having little energy Nearly every day   Poor appetite, weight loss, or overeating Nearly every day   Feeling bad about yourself - or that you are a failure or have let yourself or your family down Nearly every day   Trouble concentrating on things such as school, work, reading, or watching TV Nearly every day   Moving or speaking so slowly that other people could have noticed; or the opposite being so fidgety that others notice Nearly every day   Thoughts of being better off dead, or hurting yourself in some way Not at all   PHQ 9 Score 24   How difficult have these problems made it for you to do your work, take care of your home and get along with others -        Fall Risk Assessment:   :       Fall Risk Assessment, last 12 mths 8/9/2019   Able to walk? Yes   Fall in past 12 months? Yes   Fall with injury? 0        Abuse Screening:   :       Abuse Screening Questionnaire 4/14/2021 8/9/2019   Do you ever feel afraid of your partner? N N   Are you in a relationship with someone who physically or mentally threatens you? N N   Is it safe for you to go home?  Y Y        ADL Screening:   :       ADL Assessment 4/14/2021   Feeding yourself No Help Needed   Getting from bed to chair No Help Needed   Getting dressed No Help Needed   Bathing or showering Help Needed   Walk across the room (includes cane/walker) No Help Needed   Using the telphone No Help Needed   Taking your medications No Help Needed   Preparing meals No Help Needed   Managing money (expenses/bills) No Help Needed   Moderately strenuous housework (laundry) Help Needed   Shopping for personal items (toiletries/medicines) No Help Needed   Shopping for groceries No Help Needed   Driving No Help Needed   Climbing a flight of stairs Help Needed   Getting to places beyond walking distances No Help Needed

## 2022-01-18 NOTE — PROGRESS NOTES
Shyanne Webb is a 63 y.o. female who was seen by synchronous (real-time) audio-video technology on 1/18/2022 for Hypertension and Pain (Chronic)        Assessment & Plan:     Diagnoses and all orders for this visit:    1. Primary osteoarthritis of left hip  Has chronic pain due to OA, fibromyalgia, and polyneuropathy. This is a chronic problem that is not changed.  Per review of available records and patients , there are not sign of overuse, misuse, diversion, or concerning side effects. Today we reviewed: the risk of overdose, addiction, and dependency  The following changes were made to the patients current treatment plan: none, Tramadol will be refilled when due.       2. Lymphedema of both lower extremities  Unchanged. Continue present management. 3. Moderate major depression (HCC)  Depression severe today but denies SI. Continue venlafaxine  mg daily. 4. Morbid obesity with BMI of 50.0-59.9, adult ([address]  I offered referral to dietitian, Bariatric Surgery, or Dr. Mariam Devine but she declined today. 5. Screening for colon cancer  -     COLOGUARD TEST (FECAL DNA COLORECTAL CANCER SCREENING)      Follow-up and Dispositions    · Return in about 3 months (around 4/18/2022), or if symptoms worsen or fail to improve, for Chronic pain, depression. Routing History           712  Subjective:     Presents for 3 month follow up evaluation. 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       No new complaints. Swelling in legs unchanged. Trying to lose weight but gained some weight since last clinic visit. Was told she needed to lose 70 lbs before having hip replacement surgery. Has chronic right hand pain from carpal tunnel syndrome.     Unchanged depression. Feels depressed about having pain, about \"being stuck in the house\", and having a hard time losing weight.     Still has not had mammogram at an imaging center in [address] [place] [address] Maintenance  Flu vaccine: declined  COVID vaccine: declined      Chronic Pain Review  [facility] today to follow up on chronic pain. We discussed her left hip pain, chronic low back pain, and pain at both knees. Significant changes since last visit: none. She is  able to do her normal daily activities. She reports the following adverse side effects: none. Pain worse with standing or walking. Ambulates with a cane. Uses a wheelchair sometimes at home. Takes Tramadol 50 mg 1 tab 1-2 times a day as needed for pain. Least pain over the last week has been 5/10. Worst pain over the last week has been 10/10. Opioid Risk Tool Reviewed: YES  Aberrant behaviors: None.    Urine Drug Screen: due for this  Controlled substance agreement on file: YES.      reviewed:yes  Pill count is consistent with her prescription: yes  Concomitant use of a benzodiazepine: no  Active daily MME is 15 mg. Naloxone prescription not warranted.     ROS  A complete review of systems was performed and is negative except for those mentioned in the HPI.     3 most recent PHQ Screens 1/18/2022   PHQ Not Done -   Little interest or pleasure in doing things Nearly every day   Feeling down, depressed, irritable, or hopeless Nearly every day   Total Score PHQ 2 6   Trouble falling or staying asleep, or sleeping too much Nearly every day   Feeling tired or having little energy Nearly every day   Poor appetite, weight loss, or overeating Nearly every day   Feeling bad about yourself - or that you are a failure or have let yourself or your family down Nearly every day   Trouble concentrating on things such as school, work, reading, or watching TV Nearly every day   Moving or speaking so slowly that other people could have noticed; or the opposite being so fidgety that others notice Nearly every day   Thoughts of being better off dead, or hurting yourself in some way Not at all   [facility] 9 Score 24   How difficult have these problems made it for you to do your work, take care of your home and get along with others Extremely difficult       Prior to Admission medications    Medication Sig Start Date End Date Taking? Authorizing Provider   traMADoL (ULTRAM) 50 mg tablet Take 50 mg by mouth every six (6) hours as needed for Pain. Yes Provider, Historical   benazepriL (LOTENSIN) 40 mg tablet TAKE 1 TABLET BY MOUTH EVERY DAY FOR BLOOD PRESSURE 12/7/21  Yes Nasrin Max MD   OTHER Tumeric   Yes Provider, Historical   venlafaxine-SR (EFFEXOR-XR) 75 mg capsule Take 1 cap by mouth daily for 7 days then increase to 2 caps by mouth daily. 7/14/21  Yes Nasrin Max MD   COLLAGEN by Does Not Apply route. Yes Provider, Historical   ascorbic acid (VITAMIN C PO) Take 1 Tab by mouth daily. Takes 1 tab (1400 mg) daily. Yes Provider, Historical   zinc 50 mg tab tablet Take 50 mg by mouth daily. Yes Provider, Historical   methyl salicylate/menthol (CECIL HERRERA EX) by Apply Externally route. Yes Provider, Historical   methocarbamol (ROBAXIN) 750 mg tablet Take 1 Tab by mouth three (3) times daily as needed (muscle spasm/tightness). 8/16/19  Yes Gurwinder Souza MD   cholecalciferol, vitamin D3, 100 mcg (4,000 unit) cap Take 4,000 Units by mouth daily. Yes Provider, Historical   naproxen sodium (ALEVE) 220 mg cap Take  by mouth.    Yes Other, MD Ilda     Patient Active Problem List   Diagnosis Code    Essential hypertension I10    Idiopathic peripheral neuropathy G60.9    Osteopenia M85.80    Fibromyalgia M79.7    Generalized anxiety disorder F41.1    Primary osteoarthritis of left hip M16.12    Primary osteoarthritis of both knees M17.0    Degenerative lumbar disc M51.36    Morbid obesity with BMI of 50.0-59.9, adult (HCC) E66.01, Z68.43    Moderate major depression ([address] F32.1    Cervical radiculopathy M54.12    Lymphedema of both lower extremities I89.0    Mixed hyperlipidemia E78.2    Vitamin D deficiency E55.9    Chronic pain syndrome G89.4    Chronic use of opiate drug for therapeutic purpose Z79.891       Objective:     Patient-Reported Vitals 1/18/2022   Patient-Reported Height -   Patient-Reported Pulse -   Patient-Reported Temperature -   Patient-Reported Systolic  596   Patient-Reported Diastolic 85      General: alert, cooperative, no distress   Mental  status: normal mood, behavior, speech, dress, motor activity, and thought processes, able to follow commands   HENT: NCAT   Neck: no visualized mass   Resp: no respiratory distress   Neuro: no gross deficits   Skin: no discoloration or lesions of concern on visible areas   Psychiatric: normal affect, consistent with stated mood, no evidence of hallucinations     Additional exam findings: Obese      We discussed the expected course, resolution and complications of the diagnosis(es) in detail. Medication risks, benefits, costs, interactions, and alternatives were discussed as indicated. I advised her to contact the office if her condition worsens, changes or fails to improve as anticipated. She expressed understanding with the diagnosis(es) and plan. THIS VISIT WAS COMPLETED VIRTUALLY USING MY CHART TELEMEDICINE    Claudia Allen, was evaluated through a synchronous (real-time) audio-video encounter. The patient (or guardian if applicable) is aware that this is a billable service, which includes applicable co-pays. Verbal consent to proceed has been obtained. The visit was conducted pursuant to the emergency declaration under the [facility], [address] [place] [address] authority and the [email or web address] General Act. Patient identification was verified, and a caregiver was present when appropriate. The patient was located in a state where the provider was licensed to provide care.     Kelli Long MD

## 2022-01-21 DIAGNOSIS — M16.12 UNILATERAL PRIMARY OSTEOARTHRITIS, LEFT HIP: ICD-10-CM

## 2022-01-21 DIAGNOSIS — G60.9 HEREDITARY AND IDIOPATHIC NEUROPATHY, UNSPECIFIED: ICD-10-CM

## 2022-01-21 RX ORDER — TRAMADOL HYDROCHLORIDE 50 MG/1
TABLET ORAL
Qty: 45 TABLET | Refills: 0 | Status: SHIPPED | OUTPATIENT
Start: 2022-01-21 | End: 2022-03-07 | Stop reason: SDUPTHER

## 2022-02-16 NOTE — PROGRESS NOTES
Rosalino Cherry  Identified pt with two pt identifiers(name and ). Chief Complaint   Patient presents with   [facility]     Room 3B //       Reviewed record In preparation for visit and have obtained necessary documentation. 1. Have you been to the ER, urgent care clinic or hospitalized since your last visit? No     2. Have you seen or consulted any other health care providers outside of the [address] [place] [address] since your last visit? Include any pap smears or colon screening. No    Patient does not have an advance directive. Vitals reviewed with provider.     Health Maintenance reviewed:     Health Maintenance Due   Topic    COVID-19 Vaccine (1)    Shingrix Vaccine Age 50> (1 of 2)    PAP AKA CERVICAL CYTOLOGY     Breast Cancer Screen Mammogram     Colorectal Cancer Screening Combo           Wt Readings from Last 3 Encounters:   21 312 lb 3.2 oz (141.6 kg)   21 327 lb (148.3 kg)   20 310 lb 12.8 oz (141 kg)        Temp Readings from Last 3 Encounters:   21 98.4 °F (36.9 °C) (Oral)   21 98.3 °F (36.8 °C) (Oral)   20 98.3 °F (36.8 °C) (Oral)        BP Readings from Last 3 Encounters:   21 (!) 148/82   21 131/64   20 122/79        Pulse Readings from Last 3 Encounters:   21 87   21 94   20 83        Vitals:    21 1446   BP: (!) 148/82   Pulse: 87   Resp: 16   Temp: 98.4 °F (36.9 °C)   TempSrc: Oral   SpO2: 93%   Weight: 312 lb 3.2 oz (141.6 kg)   Height: 5' 4\" (1.626 m)   PainSc:   8   PainLoc: Hip          Learning Assessment:   :       Learning Assessment 2020 2015 3/26/2014   PRIMARY LEARNER Patient Patient Patient   HIGHEST LEVEL OF EDUCATION - PRIMARY LEARNER  - GRADUATED HIGH SCHOOL OR GED GRADUATED HIGH SCHOOL OR GED   BARRIERS PRIMARY LEARNER - NONE NONE   CO-LEARNER CAREGIVER - No No   PRIMARY LANGUAGE ENGLISH ENGLISH ENGLISH   LEARNER PREFERENCE PRIMARY OTHER (COMMENT) LISTENING VIDEOS   ANSWERED BY self patient patient   RELATIONSHIP SELF SELF SELF        Depression Screening:   :       3 most recent PHQ Screens 4/14/2021   PHQ Not Done Active Diagnosis of Depression or Bipolar Disorder   Little interest or pleasure in doing things -   Feeling down, depressed, irritable, or hopeless -   Total Score PHQ 2 -   Trouble falling or staying asleep, or sleeping too much -   Feeling tired or having little energy -   Poor appetite, weight loss, or overeating -   Feeling bad about yourself - or that you are a failure or have let yourself or your family down -   Trouble concentrating on things such as school, work, reading, or watching TV -   Moving or speaking so slowly that other people could have noticed; or the opposite being so fidgety that others notice -   Thoughts of being better off dead, or hurting yourself in some way -   PHQ 9 Score -   How difficult have these problems made it for you to do your work, take care of your home and get along with others -        Fall Risk Assessment:   :       Fall Risk Assessment, last 12 mths 8/9/2019   Able to walk? Yes   Fall in past 12 months? Yes   Fall with injury? 0        Abuse Screening:   :       Abuse Screening Questionnaire 8/9/2019   Do you ever feel afraid of your partner? N   Are you in a relationship with someone who physically or mentally threatens you? N   Is it safe for you to go home?  Y        ADL Screening:   :       ADL Assessment 2/7/2020   Feeding yourself No Help Needed   Getting from bed to chair No Help Needed   Getting dressed No Help Needed   Bathing or showering No Help Needed   Walk across the room (includes cane/walker) No Help Needed   Using the telphone No Help Needed   Taking your medications No Help Needed   Preparing meals No Help Needed   Managing money (expenses/bills) No Help Needed   Moderately strenuous housework (laundry) No Help Needed   Shopping for personal items (toiletries/medicines) No Help Needed   Shopping for groceries No Help Needed   Driving No Help Needed   Climbing a flight of stairs Help Needed   Getting to places beyond walking distances Help Needed [place] Counseling: I discussed with the patient the risks of ixekizumab including but not limited to immunosuppression, serious infections, worsening of inflammatory bowel disease and drug reactions.  The patient understands that monitoring is required including a PPD at baseline and must alert us or the primary physician if symptoms of infection or other concerning signs are noted.

## 2022-03-06 ENCOUNTER — PATIENT MESSAGE (OUTPATIENT)
Dept: INTERNAL MEDICINE CLINIC | Age: 62
End: 2022-03-06

## 2022-03-06 DIAGNOSIS — G60.9 HEREDITARY AND IDIOPATHIC NEUROPATHY, UNSPECIFIED: ICD-10-CM

## 2022-03-06 DIAGNOSIS — M16.12 UNILATERAL PRIMARY OSTEOARTHRITIS, LEFT HIP: ICD-10-CM

## 2022-03-07 RX ORDER — TRAMADOL HYDROCHLORIDE 50 MG/1
50 TABLET ORAL
Qty: 45 TABLET | Refills: 0 | Status: SHIPPED | OUTPATIENT
Start: 2022-03-07 | End: 2022-04-19 | Stop reason: SDUPTHER

## 2022-03-07 NOTE — TELEPHONE ENCOUNTER
PCP: Kiran Frias MD     Last appt: 1/18/2022   Future Appointments   Date Time Provider Keke Jerez   4/19/2022  2:20 PM Kiran Frias MD [facility] BS AMB        Requested Prescriptions     Pending Prescriptions Disp Refills    traMADoL (ULTRAM) 50 mg tablet 45 Tablet 0     Sig: Take 1 Tablet by mouth every eight (8) hours as needed for Pain for up to 15 days. Max Daily Amount: 150 mg.

## 2022-03-07 NOTE — TELEPHONE ENCOUNTER
From: Claudia Allen  To: Kelli Long MD  Sent: 3/6/2022 3:32 PM EST  Subject: Refill for Tramadol    Guilherme Mckee, could you please send a prescription to [facility] for a refill on my Tramadol? They will be due for refill this week. Also could you please make sure the quantity is 45 the past few times the prescription showed 15 and had to be corrected.  Thank you

## 2022-03-09 NOTE — TELEPHONE ENCOUNTER
I called the pharmacy and they informed her that she is getting a 15 day supply which is 45 tablets. I called the patient and verified them by name and date of birth. She stated the outside of the bag was showing a quantity of 15 but there was 45 tablets on the inside. She apologized and had no further questions.

## 2022-03-18 PROBLEM — Z79.891 CHRONIC USE OF OPIATE DRUG FOR THERAPEUTIC PURPOSE: Status: ACTIVE | Noted: 2021-02-09

## 2022-03-18 PROBLEM — I89.0 LYMPHEDEMA OF BOTH LOWER EXTREMITIES: Status: ACTIVE | Noted: 2019-11-08

## 2022-03-19 PROBLEM — M54.12 CERVICAL RADICULOPATHY: Status: ACTIVE | Noted: 2019-11-08

## 2022-03-19 PROBLEM — G89.4 CHRONIC PAIN SYNDROME: Status: ACTIVE | Noted: 2020-09-24

## 2022-03-19 PROBLEM — E55.9 VITAMIN D DEFICIENCY: Status: ACTIVE | Noted: 2019-11-08

## 2022-03-20 PROBLEM — E78.2 MIXED HYPERLIPIDEMIA: Status: ACTIVE | Noted: 2019-11-08

## 2022-03-20 PROBLEM — F32.1 MODERATE MAJOR DEPRESSION (HCC): Status: ACTIVE | Noted: 2018-09-18

## 2022-04-19 ENCOUNTER — VIRTUAL VISIT (OUTPATIENT)
Dept: INTERNAL MEDICINE CLINIC | Age: 62
End: 2022-04-19
Payer: MEDICAID

## 2022-04-19 DIAGNOSIS — I10 ESSENTIAL HYPERTENSION: ICD-10-CM

## 2022-04-19 DIAGNOSIS — M16.12 UNILATERAL PRIMARY OSTEOARTHRITIS, LEFT HIP: ICD-10-CM

## 2022-04-19 DIAGNOSIS — Z00.00 INITIAL MEDICARE ANNUAL WELLNESS VISIT: Primary | ICD-10-CM

## 2022-04-19 DIAGNOSIS — G89.4 CHRONIC PAIN SYNDROME: ICD-10-CM

## 2022-04-19 DIAGNOSIS — F32.1 MODERATE MAJOR DEPRESSION (HCC): ICD-10-CM

## 2022-04-19 PROCEDURE — G0438 PPPS, INITIAL VISIT: HCPCS | Performed by: INTERNAL MEDICINE

## 2022-04-19 PROCEDURE — 99214 OFFICE O/P EST MOD 30 MIN: CPT | Performed by: INTERNAL MEDICINE

## 2022-04-19 RX ORDER — TRAMADOL HYDROCHLORIDE 50 MG/1
50 TABLET ORAL
Qty: 45 TABLET | Refills: 0 | Status: SHIPPED | OUTPATIENT
Start: 2022-04-19 | End: 2022-06-03 | Stop reason: SDUPTHER

## 2022-04-19 NOTE — PROGRESS NOTES
Bobby 11  Identified pt with two pt identifiers(name and ). Chief Complaint   Patient presents with    Depression    Pain (Chronic)     pain - 8 (lower back & hip)        Reviewed record In preparation for visit and have obtained necessary documentation. 1. Have you been to the ER, urgent care clinic or hospitalized since your last visit? No     2. Have you seen or consulted any other health care providers outside of the [address] [place] [address] since your last visit? Include any pap smears or colon screening. No    Patient does not have an advance directive. Vitals reviewed with provider. Health Maintenance reviewed:     Health Maintenance Due   Topic    Cervical cancer screen     Breast Cancer Screen Mammogram     Colorectal Cancer Screening Combo     Medicare Yearly Exam           Wt Readings from Last 3 Encounters:   10/15/21 315 lb (142.9 kg)   21 310 lb (140.6 kg)   21 312 lb 3.2 oz (141.6 kg)        Temp Readings from Last 3 Encounters:   10/15/21 99.1 °F (37.3 °C) (Oral)   21 98.6 °F (37 °C) (Oral)   21 98.4 °F (36.9 °C) (Oral)        BP Readings from Last 3 Encounters:   10/15/21 124/76   21 (!) 142/80   21 (!) 148/82        Pulse Readings from Last 3 Encounters:   10/15/21 92   21 94   21 87      There were no vitals filed for this visit.        Learning Assessment:   :       Learning Assessment 2020 2015 3/26/2014   PRIMARY LEARNER Patient Patient Patient   HIGHEST LEVEL OF EDUCATION - PRIMARY LEARNER  - GRADUATED HIGH SCHOOL OR GED GRADUATED HIGH SCHOOL OR GED   BARRIERS PRIMARY LEARNER - NONE NONE   CO-LEARNER CAREGIVER - No No   PRIMARY LANGUAGE ENGLISH ENGLISH ENGLISH   LEARNER PREFERENCE PRIMARY OTHER (COMMENT) LISTENING VIDEOS   ANSWERED BY self patient patient   RELATIONSHIP SELF SELF SELF        Depression Screening:   :       3 most recent PHQ Screens 2022   PHQ Not Done -   Little interest or pleasure in doing things Nearly every day   Feeling down, depressed, irritable, or hopeless Nearly every day   Total Score PHQ 2 6   Trouble falling or staying asleep, or sleeping too much Nearly every day   Feeling tired or having little energy Nearly every day   Poor appetite, weight loss, or overeating Nearly every day   Feeling bad about yourself - or that you are a failure or have let yourself or your family down Nearly every day   Trouble concentrating on things such as school, work, reading, or watching TV Nearly every day   Moving or speaking so slowly that other people could have noticed; or the opposite being so fidgety that others notice Nearly every day   Thoughts of being better off dead, or hurting yourself in some way Not at all   PHQ 9 Score 24   How difficult have these problems made it for you to do your work, take care of your home and get along with others Extremely difficult        Fall Risk Assessment:   :       Fall Risk Assessment, last 12 mths 4/19/2022   Able to walk? Yes   Fall in past 12 months? 0   Do you feel unsteady? 1   Are you worried about falling 1   Fall with injury? -        Abuse Screening:   :       Abuse Screening Questionnaire 4/19/2022 4/14/2021 8/9/2019   Do you ever feel afraid of your partner? N N N   Are you in a relationship with someone who physically or mentally threatens you? N N N   Is it safe for you to go home?  Y Y Y        ADL Screening:   :       ADL Assessment 4/19/2022   Feeding yourself No Help Needed   Getting from bed to chair No Help Needed   Getting dressed No Help Needed   Bathing or showering Help Needed   Walk across the room (includes cane/walker) No Help Needed   Using the telphone No Help Needed   Taking your medications No Help Needed   Preparing meals No Help Needed   Managing money (expenses/bills) No Help Needed   Moderately strenuous housework (laundry) Help Needed   Shopping for personal items (toiletries/medicines) No Help Needed   Shopping for groceries No Help Needed   Driving No Help Needed   Climbing a flight of stairs Help Needed   Getting to places beyond walking distances No Help Needed

## 2022-04-19 NOTE — PROGRESS NOTES
This is an Initial Medicare Annual Wellness Exam (AWV) (Performed 12 months after IPPE or effective date of Medicare Part B enrollment, Once in a lifetime)    I have reviewed the patient's medical history in detail and updated the computerized patient record. Assessment/Plan   Education and counseling provided:  Are appropriate based on today's review and evaluation  End-of-Life planning (with patient's consent)    1. Initial Medicare annual wellness visit  2. Hereditary and idiopathic neuropathy, unspecified  The following orders have not been finalized:  -     traMADoL (ULTRAM) 50 mg tablet  3.  Unilateral primary osteoarthritis, left hip  The following orders have not been finalized:  -     traMADoL (ULTRAM) 50 mg tablet       Depression Risk Factor Screening:     3 most recent PHQ Screens 1/18/2022   PHQ Not Done -   Little interest or pleasure in doing things Nearly every day   Feeling down, depressed, irritable, or hopeless Nearly every day   Total Score PHQ 2 6   Trouble falling or staying asleep, or sleeping too much Nearly every day   Feeling tired or having little energy Nearly every day   Poor appetite, weight loss, or overeating Nearly every day   Feeling bad about yourself - or that you are a failure or have let yourself or your family down Nearly every day   Trouble concentrating on things such as school, work, reading, or watching TV Nearly every day   Moving or speaking so slowly that other people could have noticed; or the opposite being so fidgety that others notice Nearly every day   Thoughts of being better off dead, or hurting yourself in some way Not at all   PHQ 9 Score 24   How difficult have these problems made it for you to do your work, take care of your home and get along with others Extremely difficult       Alcohol & Drug Abuse Risk Screen    Do you average more than 1 drink per night or more than 7 drinks a week:  No    On any one occasion in the past three months have you have had more than 3 drinks containing alcohol:  No          Functional Ability and Level of Safety:    Hearing: Hearing is good. Activities of Daily Living: The home contains: handrails and grab bars  Patient does total self care      Ambulation: with difficulty, uses a cane      Fall Risk:  Fall Risk Assessment, last 12 mths 4/19/2022   Able to walk? Yes   Fall in past 12 months? 0   Do you feel unsteady? 1   Are you worried about falling 1   Fall with injury?  -      Abuse Screen:  Patient is not abused       Cognitive Screening    Has your family/caregiver stated any concerns about your memory: no     Cognitive Screening: normal on exam    Health Maintenance Due     Health Maintenance Due   Topic Date Due    Cervical cancer screen  11/20/2016    Breast Cancer Screen Mammogram  06/29/2017    Colorectal Cancer Screening Combo  04/10/2018       Patient Care Team   Patient Care Team:  Freddy Jasso MD as PCP - General (Internal Medicine)  Freddy Jasso MD as PCP - [facility] Empaneled Provider    History     Patient Active Problem List   Diagnosis Code    Essential hypertension I10    Idiopathic peripheral neuropathy G60.9    Osteopenia M85.80    Fibromyalgia M79.7    Generalized anxiety disorder F41.1    Primary osteoarthritis of left hip M16.12    Primary osteoarthritis of both knees M17.0    Degenerative lumbar disc M51.36    Morbid obesity with BMI of 50.0-59.9, adult ([address] E66.01, Z68.43    Moderate major depression ([address] F32.1    Cervical radiculopathy M54.12    Lymphedema of both lower extremities I89.0    Mixed hyperlipidemia E78.2    Vitamin D deficiency E55.9    Chronic pain syndrome G89.4    Chronic use of opiate drug for therapeutic purpose Z79.891     Past Medical History:   Diagnosis Date    Chronic venous hypertension (idiopathic) with ulcer and inflammation of bilateral lower extremity (CODE) ([address] 12/6/2018    Depression with anxiety     Fibromyalgia     Hypertension     Lumbar herniated disc 7/17/2015    Morbid obesity ([address]     Osteoarthritis of knee 2016    R knee X-ray 7/5/16 ([facility]) mod to severe tibiofemoral arthritis, mod patellofemoral arthritis    Osteoarthritis of left hip 2015    Osteopenia       Past Surgical History:   Procedure Laterality Date   Stanley Monte    HX FREE SKIN GRAFT  1990    [facility]    HX WISDOM TEETH EXTRACTION  1992     Current Outpatient Medications   Medication Sig Dispense Refill    OTHER Tumeric      COLLAGEN by Does Not Apply route.  ascorbic acid (VITAMIN C PO) Take 1 Tab by mouth daily. Takes 1 tab (1400 mg) daily.  methyl salicylate/menthol (CECIL HERRERA EX) by Apply Externally route.  methocarbamol (ROBAXIN) 750 mg tablet Take 1 Tab by mouth three (3) times daily as needed (muscle spasm/tightness). 90 Tab 1    cholecalciferol, vitamin D3, 100 mcg (4,000 unit) cap Take 4,000 Units by mouth daily.  naproxen sodium (ALEVE) 220 mg cap Take  by mouth.  benazepriL (LOTENSIN) 40 mg tablet TAKE 1 TABLET BY MOUTH EVERY DAY FOR BLOOD PRESSURE (Patient not taking: Reported on 4/19/2022) 30 Tablet 3    venlafaxine-SR (EFFEXOR-XR) 75 mg capsule Take 1 cap by mouth daily for 7 days then increase to 2 caps by mouth daily. 60 Capsule 0    zinc 50 mg tab tablet Take 50 mg by mouth daily.        No Known Allergies    Family History   Problem Relation Age of Onset    Diabetes Mother         degenerative disk disease/fibromyalgia/artheritis    Depression Mother     Anxiety Mother     Other Mother         Fibromyalgia    Other Maternal Aunt         lymphoma    Cancer Maternal Aunt     No Known Problems Father     Dementia Maternal Grandmother      Social History     Tobacco Use    Smoking status: Never Smoker    Smokeless tobacco: Never Used   Substance Use Topics    Alcohol use: No     Alcohol/week: 0.0 standard drinks     Comment: previously drank beer quit in 2005       Bobby 11, was evaluated through a synchronous (real-time) audio-video encounter. The patient (or guardian if applicable) is aware that this is a billable service, which includes applicable co-pays. Verbal consent to proceed has been obtained. The visit was conducted pursuant to the emergency declaration under the [address] [place] [address], [address] [place] [address] authority and the [employer] General Act. Patient identification was verified, and a caregiver was present when appropriate. The patient was located at home in a state where the provider was licensed to provide care.        Sarah Noe MD

## 2022-04-19 NOTE — PROGRESS NOTES
Waleska Cantrell is a 63 y.o. female who was seen by synchronous (real-time) audio-video technology on 4/19/2022 for Depression and Pain (Chronic) (pain - 8 (lower back & hip) )        Assessment & Plan:     Diagnoses and all orders for this visit:    1. Initial Medicare annual wellness visit    2. Essential hypertension  Controlled today. Patient non-compliant with benazepril 20 mg daily. 3. Unilateral primary osteoarthritis, left hip  -     Refill traMADoL (ULTRAM) 50 mg tablet; Take 1 Tablet by mouth every eight (8) hours as needed for Pain for up to 15 days. Max Daily Amount: 150 mg.    4. Chronic pain syndrome  This is a chronic problem that is not changed. Per review of available records and patients , there are not sign of overuse, misuse, diversion, or concerning side effects. Today we reviewed: the goals of treatment (improve functionality, quality of life, and pain)  The following changes were made to the patients current treatment plan: nothing, medications refilled. 5. Moderate major depression ([address]  Instructed her to start following with a therapist. Provided her information on 2 places to call:    [address] [place] [address]  [phone]      Follow-up and Dispositions    · Return in about 4 months (around 8/19/2022), or if symptoms worsen or fail to improve, for Chronic pain, HTN.           712  Subjective:     Presents for Medicare AWV and 3 month follow up evaluation. 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      Reports that she started having diarrhea in early April. Has persisted. Denies new medications prior to diarrhea onset. Has not been taking Effexor or benazepril. Says she wants to take \"all natural medications\". Started taking a supplement for BP a few days ago. Feels depressed. No motivation.  Has not had labs or mammogram done, has not completed Cologuard ordered 1/18/22, has not been to Lymphedema Clinic, and is not trying to lose weight. Was told she needed to lose 79 lbs before having hip replacement surgery.      Health Maintenance  COVID vaccine: declined     Chronic Pain Review  [facility] today to follow up on chronic pain. We discussed her left hip pain, chronic low back pain, and pain at both knees. Significant changes since last visit: none. She is  able to do her normal daily activities. She reports the following adverse side effects: none. Pain worse with standing or walking. Ambulates with a cane. Uses a wheelchair sometimes at home. Takes Tramadol 50 mg 1 tab 1-2 times a day as needed for pain. Least pain over the last week has been 5/10. Worst pain over the last week has been 10/10. Opioid Risk Tool Reviewed: YES  Aberrant behaviors: None.    Urine Drug Screen: due for this  Controlled substance agreement on file: YES.      reviewed:yes  Pill count is consistent with her prescription: yes  Concomitant use of a benzodiazepine: no  Active daily MME is 15 mg. Naloxone prescription not warranted.     ROS  A complete review of systems was performed and is negative except for those mentioned in the HPI.     3 most recent PHQ Screens 4/19/2022   PHQ Not Done -   Little interest or pleasure in doing things More than half the days   Feeling down, depressed, irritable, or hopeless More than half the days   Total Score PHQ 2 4   Trouble falling or staying asleep, or sleeping too much Several days   Feeling tired or having little energy Several days   Poor appetite, weight loss, or overeating More than half the days   Feeling bad about yourself - or that you are a failure or have let yourself or your family down More than half the days   Trouble concentrating on things such as school, work, reading, or watching TV Several days   Moving or speaking so slowly that other people could have noticed; or the opposite being so fidgety that others notice Several days   Thoughts of being better off dead, or hurting yourself in some way Not at all   PHQ 9 Score 12   How difficult have these problems made it for you to do your work, take care of your home and get along with others -     Prior to Admission medications    Medication Sig Start Date End Date Taking? Authorizing Provider   OTHER Tumeric   Yes Provider, Historical   COLLAGEN by Does Not Apply route. Yes Provider, Historical   ascorbic acid (VITAMIN C PO) Take 1 Tab by mouth daily. Takes 1 tab (1400 mg) daily. Yes Provider, Historical   methyl salicylate/menthol (CECIL HERRERA EX) by Apply Externally route. Yes Provider, Historical   methocarbamol (ROBAXIN) 750 mg tablet Take 1 Tab by mouth three (3) times daily as needed (muscle spasm/tightness). 8/16/19  Yes Cosmo Souza MD   cholecalciferol, vitamin D3, 100 mcg (4,000 unit) cap Take 4,000 Units by mouth daily. Yes Provider, Historical   naproxen sodium (ALEVE) 220 mg cap Take  by mouth. Yes Other, MD Ilda   benazepriL (LOTENSIN) 40 mg tablet TAKE 1 TABLET BY MOUTH EVERY DAY FOR BLOOD PRESSURE  Patient not taking: Reported on 4/19/2022 12/7/21   Katarzyna Boateng MD   venlafaxine-SR [facility] 75 mg capsule Take 1 cap by mouth daily for 7 days then increase to 2 caps by mouth daily. 7/14/21   Katarzyna Boateng MD   zinc 50 mg tab tablet Take 50 mg by mouth daily.     Provider, Historical     Patient Active Problem List   Diagnosis Code    Essential hypertension I10    Idiopathic peripheral neuropathy G60.9    Osteopenia M85.80    Fibromyalgia M79.7    Generalized anxiety disorder F41.1    Primary osteoarthritis of left hip M16.12    Primary osteoarthritis of both knees M17.0    Degenerative lumbar disc M51.36    Morbid obesity with BMI of 50.0-59.9, adult ([facility] E66.01, Z68.43    Moderate major depression ([facility] F32.1    Cervical radiculopathy M54.12    Lymphedema of both lower extremities I89.0  Mixed hyperlipidemia E78.2    Vitamin D deficiency E55.9    Chronic pain syndrome G89.4    Chronic use of opiate drug for therapeutic purpose Z79.891       Objective:     Patient-Reported Vitals 4/19/2022   Patient-Reported Weight 320   Patient-Reported Height 5'4   Patient-Reported Pulse 88   Patient-Reported Temperature 97.7   Patient-Reported Systolic  981   Patient-Reported Diastolic 83      General: alert, cooperative, no distress   Mental  status: normal mood, behavior, speech, dress, motor activity, and thought processes, able to follow commands   HENT: NCAT   Neck: no visualized mass   Resp: no respiratory distress   Neuro: no gross deficits   Skin: no discoloration or lesions of concern on visible areas   Psychiatric: normal affect, consistent with stated mood, no evidence of hallucinations     Additional exam findings: morbidly obese      We discussed the expected course, resolution and complications of the diagnosis(es) in detail. Medication risks, benefits, costs, interactions, and alternatives were discussed as indicated. I advised her to contact the office if her condition worsens, changes or fails to improve as anticipated. She expressed understanding with the diagnosis(es) and plan. Stanley Fermin, was evaluated through a synchronous (real-time) audio-video encounter. The patient (or guardian if applicable) is aware that this is a billable service, which includes applicable co-pays. Verbal consent to proceed has been obtained. The visit was conducted pursuant to the emergency declaration under the [facility], [address] [place] [address] authority and the [employer] General Act. Patient identification was verified, and a caregiver was present when appropriate. The patient was located at home in a state where the provider was licensed to provide care.     Rosaura Hendrix MD

## 2022-04-19 NOTE — ACP (ADVANCE CARE PLANNING)
Advance Care Planning     Advance Care Planning (ACP) Physician/NP/PA Conversation      Date of Conversation: 4/19/2022  Conducted with: Patient with Decision Making Capacity    Healthcare Decision Maker:     Primary Decision Maker: Virginia Jolly - Chata - [phone]  Click here to complete [address] including selection of the Healthcare Decision Maker Relationship (ie \"Primary\")    Care Preferences:    Hospitalization: \"If your health worsens and it becomes clear that your chance of recovery is unlikely, what would be your preference regarding hospitalization? \"  The patient would prefer hospitalization. Ventilation: \"If you were unable to breathe on your own and your chance of recovery was unlikely, what would be your preference about the use of a ventilator (breathing machine) if it was available to you? \"   The patient would desire the use of a ventilator. Resuscitation: \"In the event your heart stopped as a result of an underlying serious health condition, would you want attempts to be made to restart your heart, or would you prefer a natural death? \"   Yes, attempt to resuscitate.     Additional topics discussed: treatment goals    Conversation Outcomes / Follow-Up Plan:   ACP in process - information provided, considering goals and options  Reviewed DNR/DNI and patient elects Full Code (Attempt Resuscitation)     Length of Voluntary ACP Conversation in minutes:  <16 minutes (Non-Billable)    Kimberly Gardiner MD

## 2022-04-19 NOTE — PATIENT INSTRUCTIONS

## 2022-04-29 LAB — HBA1C MFR BLD HPLC: 5.7 % (ref 4.8–5.6)

## 2022-04-30 LAB
CREATININE, EXTERNAL: 0.64
HBA1C MFR BLD HPLC: 5.7 %
LDL-C, EXTERNAL: 128

## 2022-05-01 ENCOUNTER — PATIENT MESSAGE (OUTPATIENT)
Dept: INTERNAL MEDICINE CLINIC | Age: 62
End: 2022-05-01

## 2022-05-03 ENCOUNTER — DOCUMENTATION ONLY (OUTPATIENT)
Dept: INTERNAL MEDICINE CLINIC | Age: 62
End: 2022-05-03

## 2022-05-05 NOTE — TELEPHONE ENCOUNTER
From: Ijeoma Shin  To: Kimberly Gardiner MD  Sent: 5/1/2022 8:24 AM EDT  Subject: Copy of Lab [employer]    attaching a copy of the [facility] [facility] . I am concerned with the tests that are showing a high or out of range result. I knew i had been not been doing very well eating healthy etc . also , were all the tests done that you asked for ? It doesn't seem like there were many tests done that weren't done before on a normal basis.

## 2022-05-26 ENCOUNTER — CLINICAL SUPPORT (OUTPATIENT)
Dept: INTERNAL MEDICINE CLINIC | Age: 62
End: 2022-05-26

## 2022-05-26 ENCOUNTER — TELEPHONE (OUTPATIENT)
Dept: INTERNAL MEDICINE CLINIC | Age: 62
End: 2022-05-26

## 2022-05-26 DIAGNOSIS — G89.4 CHRONIC PAIN SYNDROME: Primary | ICD-10-CM

## 2022-05-26 DIAGNOSIS — Z79.891 CHRONIC USE OF OPIATE DRUG FOR THERAPEUTIC PURPOSE: Primary | ICD-10-CM

## 2022-05-26 DIAGNOSIS — Z79.891 CHRONIC USE OF OPIATE DRUG FOR THERAPEUTIC PURPOSE: ICD-10-CM

## 2022-06-03 ENCOUNTER — PATIENT MESSAGE (OUTPATIENT)
Dept: INTERNAL MEDICINE CLINIC | Age: 62
End: 2022-06-03

## 2022-06-03 DIAGNOSIS — M16.12 UNILATERAL PRIMARY OSTEOARTHRITIS, LEFT HIP: ICD-10-CM

## 2022-06-03 LAB — DRUGS UR: NORMAL

## 2022-06-03 RX ORDER — TRAMADOL HYDROCHLORIDE 50 MG/1
50 TABLET ORAL
Qty: 45 TABLET | Refills: 0 | Status: SHIPPED | OUTPATIENT
Start: 2022-06-03 | End: 2022-07-18 | Stop reason: SDUPTHER

## 2022-06-03 NOTE — TELEPHONE ENCOUNTER
From: Waleska Cantrell  To: Lacey De Oliveira MD  Sent: 6/3/2022 2:54 PM EDT  Subject: Tramadol refill    Dr. Penny Davison, would you please send the refill for the Tramadol to [facility] for me.  Thank you

## 2022-06-03 NOTE — TELEPHONE ENCOUNTER
PCP: Marcello Levine MD     Last appt: 5/26/2022   Future Appointments   Date Time Provider Keke Jerez   8/19/2022  2:00 PM Marcello Levine MD [facility] AMB        Requested Prescriptions     Pending Prescriptions Disp Refills    traMADoL (ULTRAM) 50 mg tablet 45 Tablet 0     Sig: Take 1 Tablet by mouth every eight (8) hours as needed for Pain for up to 15 days. Max Daily Amount: 150 mg.

## 2022-07-18 RX ORDER — TRAMADOL HYDROCHLORIDE 50 MG/1
50 TABLET ORAL
Qty: 45 TABLET | Refills: 0 | Status: SHIPPED | OUTPATIENT
Start: 2022-07-18 | End: 2022-09-01 | Stop reason: SDUPTHER

## 2022-08-19 ENCOUNTER — VIRTUAL VISIT (OUTPATIENT)
Dept: INTERNAL MEDICINE CLINIC | Age: 62
End: 2022-08-19
Payer: MEDICARE

## 2022-08-19 DIAGNOSIS — F32.1 MODERATE MAJOR DEPRESSION (HCC): ICD-10-CM

## 2022-08-19 DIAGNOSIS — G89.4 CHRONIC PAIN SYNDROME: Primary | ICD-10-CM

## 2022-08-19 DIAGNOSIS — I10 ESSENTIAL HYPERTENSION: ICD-10-CM

## 2022-08-19 PROCEDURE — G8756 NO BP MEASURE DOC: HCPCS | Performed by: INTERNAL MEDICINE

## 2022-08-19 PROCEDURE — G9717 DOC PT DX DEP/BP F/U NT REQ: HCPCS | Performed by: INTERNAL MEDICINE

## 2022-08-19 PROCEDURE — 99214 OFFICE O/P EST MOD 30 MIN: CPT | Performed by: INTERNAL MEDICINE

## 2022-08-19 PROCEDURE — 3017F COLORECTAL CA SCREEN DOC REV: CPT | Performed by: INTERNAL MEDICINE

## 2022-08-19 PROCEDURE — G8427 DOCREV CUR MEDS BY ELIG CLIN: HCPCS | Performed by: INTERNAL MEDICINE

## 2022-08-19 NOTE — PROGRESS NOTES
Maryanne Vázquez is a 63 y.o. female who was seen by synchronous (real-time) audio-video technology on 8/19/2022 for Follow-up (4 month), Pain (Chronic), Hypertension, and Depression        Assessment & Plan:   Diagnoses and all orders for this visit:    1. Chronic pain syndrome  This is a chronic problem that is not changed. Per review of available records and patients , there are not sign of overuse, misuse, diversion, or concerning side effects. Today we reviewed: the risk of overdose, addiction, and dependency  The following changes were made to the patients current treatment plan:  none . I plan to refer her to Pain Management at next appointment. 2. Moderate major depression ([address]  She declines medication. Was previously on Effexor and prior to that on Prozac. I provided her a list of places to call for counseling at her previous visit and reminded her to call these places for an appointment. 3. Essential hypertension  Controlled. Continue present management. Follow-up and Dispositions    Return in about 4 months (around 12/19/2022), or if symptoms worsen or fail to improve, for Chronic pain, depression. 712  Subjective:     Presents for 4 month follow up evaluation. She has HTN, depression with anxiety, fibromyalgia, chronic pain, osteoarthritis at lumbar spine, morbid obesity, lymphedema, and idiopathic polyneuropathy. Complains of more pain at lower back. Ordered CBD tincture that she puts in her coffee. Thinks it helps pain a little. Plans to schedule an appointment at [address] in [place]. Started taking a BP support supplement. Home BP: 125-129/80's    Still with depression; has lack of motivation, does not want to leave the house most days. Still has not had labs or mammogram done, completed Cologuard ordered 1/18/22, or been to Lymphedema Clinic. Having hard time losing weight.  Was told she needed to lose 70 lbs before having hip replacement surgery. Never received bariatric raised toilet seat. Needs wheelchair repair; on part of wheelchair cuts into her skin. Will address these issues with her new [place] . Chronic Pain Review  [facility] today to follow up on chronic pain. We discussed her left hip pain, chronic low back pain, and pain at both knees. Significant changes since last visit: none. She is  able to do her normal daily activities. She reports the following adverse side effects: none. Pain worse with standing or walking. Ambulates with a cane. Uses a wheelchair sometimes at home. Takes Tramadol 50 mg 1 tab 1-2 times a day as needed for pain. Least pain over the last week has been 6/10. Worst pain over the last week has been 10/10. Opioid Risk Tool Reviewed: YES  Aberrant behaviors: None. Urine Drug Screen: due for this  Controlled substance agreement on file: YES.  reviewed:yes  Pill count is consistent with her prescription: yes  Concomitant use of a benzodiazepine: no  Active daily MME is 15 mg. Naloxone prescription not warranted. Prior to Admission medications    Medication Sig Start Date End Date Taking? Authorizing Provider   OTHER Tumeric   Yes Provider, Historical   COLLAGEN by Does Not Apply route. Yes Provider, Historical   ascorbic acid (VITAMIN C PO) Take 1 Tab by mouth daily. Takes 1 tab (1400 mg) daily. Yes Provider, Historical   methyl salicylate/menthol (CECIL HERRERA EX) by Apply Externally route. Yes Provider, Historical   cholecalciferol, vitamin D3, 100 mcg (4,000 unit) cap Take 4,000 Units by mouth daily. Yes Provider, Historical   naproxen sodium 220 mg cap Take  by mouth.    Yes Other, MD Ilda     Patient Active Problem List   Diagnosis Code    Essential hypertension I10    Idiopathic peripheral neuropathy G60.9    Osteopenia M85.80    Fibromyalgia M79.7    Generalized anxiety disorder F41.1    Primary osteoarthritis of left hip M16.12    Primary osteoarthritis of both knees M17.0    Degenerative lumbar disc M51.36    Morbid obesity with BMI of 50.0-59.9, adult (HCC) E66.01, Z68.43    Moderate major depression (HCC) F32.1    Cervical radiculopathy M54.12    Lymphedema of both lower extremities I89.0    Mixed hyperlipidemia E78.2    Vitamin D deficiency E55.9    Chronic pain syndrome G89.4    Chronic use of opiate drug for therapeutic purpose Z79.891       Objective:     Patient-Reported Vitals 8/19/2022   Patient-Reported Weight 324   Patient-Reported Height -   Patient-Reported Pulse 81   Patient-Reported Temperature 97.1   Patient-Reported Systolic  250   Patient-Reported Diastolic 82      General: alert, cooperative, no distress   Mental  status: normal mood, behavior, speech, dress, motor activity, and thought processes, able to follow commands   HENT: NCAT   Neck: no visualized mass   Resp: no respiratory distress   Neuro: no gross deficits   Skin: no discoloration or lesions of concern on visible areas   Psychiatric: normal affect, consistent with stated mood, no evidence of hallucinations     Additional exam findings: We discussed the expected course, resolution and complications of the diagnosis(es) in detail. Medication risks, benefits, costs, interactions, and alternatives were discussed as indicated. I advised her to contact the office if her condition worsens, changes or fails to improve as anticipated. She expressed understanding with the diagnosis(es) and plan. Mynor Murrieta, was evaluated through a synchronous (real-time) audio-video encounter. The patient (or guardian if applicable) is aware that this is a billable service, which includes applicable co-pays. This Virtual Visit was conducted with patient's (and/or legal guardian's) consent.  The visit was conducted pursuant to the emergency declaration under the [facility] waiver authority and the [employer] Supplemental Appropriations Act. Patient identification was verified, and a caregiver was present when appropriate. The patient was located at: Home: [place] [phone]  The provider was located at:  Facility (Appt Department): [address] [address]        Josie Soto MD

## 2022-08-19 NOTE — PROGRESS NOTES
Virtual Visit  Visit via [employer]    Identified pt with two pt identifiers(name and ). Reviewed record in preparation for visit and have obtained necessary documentation. All patient medications has been reviewed. Chief Complaint   Patient presents with    Follow-up     4 month    Pain (Chronic)    Hypertension    Depression       Health Maintenance Due   Topic    COVID-19 Vaccine (1)    Cervical cancer screen     Breast Cancer Screen Mammogram     Colorectal Cancer Screening Combo        There were no vitals filed for this visit. 4.Have you been to the ER, urgent care clinic since your last visit? Hospitalized since your last visit? No    5. Have you seen or consulted any other health care providers outside of the [address] [place] [address] since your last visit? Include any pap smears or colon screening. No    Patient is accompanied by self I have received verbal consent from Zayda Steinberg to discuss any/all medical information while they are present in the room.

## 2022-08-31 ENCOUNTER — PATIENT MESSAGE (OUTPATIENT)
Dept: INTERNAL MEDICINE CLINIC | Age: 62
End: 2022-08-31

## 2022-08-31 DIAGNOSIS — M16.12 UNILATERAL PRIMARY OSTEOARTHRITIS, LEFT HIP: ICD-10-CM

## 2022-09-01 RX ORDER — TRAMADOL HYDROCHLORIDE 50 MG/1
50 TABLET ORAL
Qty: 45 TABLET | Refills: 0 | Status: SHIPPED | OUTPATIENT
Start: 2022-09-01 | End: 2022-10-18 | Stop reason: SDUPTHER

## 2022-09-01 NOTE — TELEPHONE ENCOUNTER
From: Kb Miguel  To: Anson Cockayne, MD  Sent: 8/31/2022 9:07 PM EDT  Subject: Tramadol refill    Dr. Juanpablo Carney could you please send the refill to [facility] for my Tramadol.  Thank you

## 2022-10-18 ENCOUNTER — PATIENT MESSAGE (OUTPATIENT)
Dept: INTERNAL MEDICINE CLINIC | Age: 62
End: 2022-10-18

## 2022-10-18 DIAGNOSIS — M16.12 UNILATERAL PRIMARY OSTEOARTHRITIS, LEFT HIP: ICD-10-CM

## 2022-10-18 RX ORDER — TRAMADOL HYDROCHLORIDE 50 MG/1
50 TABLET ORAL
Qty: 45 TABLET | Refills: 0 | Status: SHIPPED | OUTPATIENT
Start: 2022-10-18 | End: 2022-11-02

## 2022-10-18 NOTE — TELEPHONE ENCOUNTER
Pt has sent a Relead message for a refill of Tramadol. PCP: Carisa Dunaway MD     Last appt: 8/19/2022     Future Appointments   Date Time Provider Keke Jerez   12/19/2022  2:20 PM Carisa Dunaway MD [facility] AMB          Requested Prescriptions     Pending Prescriptions Disp Refills    traMADoL (ULTRAM) 50 mg tablet 45 Tablet 0     Sig: Take 1 Tablet by mouth every eight (8) hours as needed for Pain for up to 15 days. Max Daily Amount: 150 mg.

## 2022-10-19 RX ORDER — TRAMADOL HYDROCHLORIDE 50 MG/1
50 TABLET ORAL
Qty: 45 TABLET | Refills: 0 | Status: CANCELLED | OUTPATIENT
Start: 2022-10-19 | End: 2022-11-03

## 2022-10-19 NOTE — TELEPHONE ENCOUNTER
PCP: Gary Dodd MD     Last appt: 8/19/2022     Future Appointments   Date Time Provider Keke Jerez   12/19/2022  2:20 PM Gary Dodd MD [facility] AMB          Requested Prescriptions     Pending Prescriptions Disp Refills    traMADoL (ULTRAM) 50 mg tablet 45 Tablet 0     Sig: Take 1 Tablet by mouth every eight (8) hours as needed for Pain for up to 15 days. Max Daily Amount: 150 mg.

## 2022-10-19 NOTE — TELEPHONE ENCOUNTER
From: Shyanne Webb  To: Mariana Ernandez MD  Sent: 10/18/2022 12:35 PM EDT  Subject: Refill of Tramadol    Dr. Yeni Rodriguez , would you please send refill prescription for my Tramadol to [facility] in [place]? Thank you .

## 2022-12-02 DIAGNOSIS — M16.12 UNILATERAL PRIMARY OSTEOARTHRITIS, LEFT HIP: ICD-10-CM

## 2022-12-03 RX ORDER — TRAMADOL HYDROCHLORIDE 50 MG/1
50 TABLET ORAL
Qty: 45 TABLET | Refills: 0 | Status: SHIPPED | OUTPATIENT
Start: 2022-12-03 | End: 2022-12-18

## 2022-12-09 ENCOUNTER — TELEPHONE (OUTPATIENT)
Dept: INTERNAL MEDICINE CLINIC | Age: 62
End: 2022-12-09

## 2022-12-09 NOTE — TELEPHONE ENCOUNTER
Pauly Thomas is calling from [place] urgent care and she needs a new order for the pt to get their blood work. Everything that was ordered from April 4th.      Phone: [phone]  Fax: [phone]

## 2022-12-09 NOTE — TELEPHONE ENCOUNTER
I called Bryan Mcleod and informed her that Dr. Yeni Rodriguez just ordered labs today and they have been faxed over. She stated understanding and got them.

## 2022-12-19 ENCOUNTER — VIRTUAL VISIT (OUTPATIENT)
Dept: INTERNAL MEDICINE CLINIC | Age: 62
End: 2022-12-19
Payer: MEDICAID

## 2022-12-19 DIAGNOSIS — G89.4 CHRONIC PAIN SYNDROME: ICD-10-CM

## 2022-12-19 DIAGNOSIS — I89.0 LYMPHEDEMA OF BOTH LOWER EXTREMITIES: ICD-10-CM

## 2022-12-19 DIAGNOSIS — L03.317 CELLULITIS OF BUTTOCK: Primary | ICD-10-CM

## 2022-12-19 DIAGNOSIS — I10 ESSENTIAL HYPERTENSION: ICD-10-CM

## 2022-12-19 RX ORDER — AMOXICILLIN AND CLAVULANATE POTASSIUM 875; 125 MG/1; MG/1
1 TABLET, FILM COATED ORAL EVERY 12 HOURS
Qty: 14 TABLET | Refills: 0 | Status: SHIPPED | OUTPATIENT
Start: 2022-12-19 | End: 2022-12-26

## 2022-12-19 NOTE — PROGRESS NOTES
CC:   Chief Complaint   Patient presents with    Depression    Pain (Chronic)       HISTORY OF PRESENT ILLNESS  Chrissy Lanza is a 63 y.o. female. Presents for 4 month follow up evaluation. She has HTN, depression with anxiety, fibromyalgia, chronic pain, osteoarthritis at lumbar spine, morbid obesity, lymphedema, and idiopathic polyneuropathy. Has a place on her bottom that is sore. Thinks it started after moving on the floor on her bottom after having a fall. Still not getting better after using diaper rash cream and a wound care astringent called Terasil. Feels rough and raised. Was seen at [address] [place] [address] for the pain at the bottom. Diagnosed with fungal infection and prescribed ciclopirox cream but not using. Working on trying to lose weight. Lost 10 lbs over past 3 months. Uses cane and wheelchair to get around. /91 at 2:24 pm. Took 2 blood pressure support tablets today. Chronic Pain Review  Chrissy Lanza RTC today to follow up on chronic pain. We discussed her left hip pain, chronic low back pain, and pain at both knees. Significant changes since last visit: none. She is  able to do her normal daily activities. She reports the following adverse side effects: none. Pain worse with standing or walking. Ambulates with a cane. Uses a wheelchair sometimes at home. Takes Tramadol 50 mg 1 tab 1-2 times a day as needed for pain. Least pain over the last week has been 6/10. Worst pain over the last week has been 10/10. Opioid Risk Tool Reviewed: YES  Aberrant behaviors: None. Urine Drug Screen: due for this  Controlled substance agreement on file: YES.  reviewed: yes  Pill count is consistent with her prescription: yes  Concomitant use of a benzodiazepine: no  Active daily MME is 15 mg. Naloxone prescription not warranted. ROS  A complete review of systems was performed and is negative except for those mentioned in the HPI.        Patient Active Problem List Diagnosis Code    Essential hypertension I10    Idiopathic peripheral neuropathy G60.9    Osteopenia M85.80    Fibromyalgia M79.7    Generalized anxiety disorder F41.1    Primary osteoarthritis of left hip M16.12    Primary osteoarthritis of both knees M17.0    Degenerative lumbar disc M51.36    Morbid obesity with BMI of 50.0-59.9, adult (HCC) E66.01, Z68.43    Moderate major depression ([facility]) F32.1    Cervical radiculopathy M54.12    Lymphedema of both lower extremities I89.0    Mixed hyperlipidemia E78.2    Vitamin D deficiency E55.9    Chronic pain syndrome G89.4    Chronic use of opiate drug for therapeutic purpose Z79.891     Past Medical History:   Diagnosis Date    Chronic venous hypertension (idiopathic) with ulcer and inflammation of bilateral [facility] 12/6/2018    Depression with anxiety     Fibromyalgia     Hypertension     Lumbar herniated disc 7/17/2015    Morbid obesity ([facility]     Osteoarthritis of knee 2016    R knee X-ray 7/5/16 ([facility]) mod to severe tibiofemoral arthritis, mod patellofemoral arthritis    Osteoarthritis of left hip 2015    Osteopenia      No Known Allergies    Current Outpatient Medications   Medication Sig Dispense Refill    OTHER,NON-FORMULARY, Healthy Blood Pressure Support Suppliment      COLLAGEN by Does Not Apply route. ascorbic acid (VITAMIN C PO) Take 1 Tab by mouth daily. Takes 1 tab (1400 mg) daily. methyl salicylate/menthol (CECIL HERRERA EX) by Apply Externally route. cholecalciferol, vitamin D3, 100 mcg (4,000 unit) cap Take 4,000 Units by mouth daily. naproxen sodium 220 mg cap Take  by mouth. PHYSICAL EXAM  There were no vitals taken for this visit. General: Well-developed and well-nourished, no distress. HEENT:  Head normocephalic/atraumatic, no scleral icterus  Lungs:  Clear to ausculation bilaterally. Good air movement.   Heart:  Regular rate and rhythm, normal S1 and S2, no murmur, gallop, or rub  Extremities: No clubbing, cyanosis, or edema. Neurological: Alert and oriented. Psychiatric: Normal mood and affect. Behavior is normal.     Results for orders placed or performed in visit on 05/26/22   COMPLIANCE DRUG SCREEN/PRESCRIPTION MONITORING   Result Value Ref Range    Summary Note          ASSESSMENT AND PLAN  {ASSESSMENT/PLAN:01954}      I have discussed the diagnosis with the patient and the intended plan as seen in the above orders. Patient is in agreement. The patient has received an after-visit summary and questions were answered concerning future plans. I have discussed medication side effects and warnings with the patient as well.

## 2022-12-19 NOTE — PROGRESS NOTES
Marcelle Durham  Identified pt with two pt identifiers(name and ). Chief Complaint   Patient presents with    Depression    Pain (Chronic)       Reviewed record In preparation for visit and have obtained necessary documentation. 1. Have you been to the ER, urgent care clinic or hospitalized since your last visit? No     2. Have you seen or consulted any other health care providers outside of the [address] [place] [address] since your last visit? Include any pap smears or colon screening. No    Vitals reviewed with provider. Health Maintenance reviewed:     Health Maintenance Due   Topic    Breast Cancer Screen Mammogram     Colorectal Cancer Screening Combo     Flu Vaccine (1)          Wt Readings from Last 3 Encounters:   10/15/21 315 lb (142.9 kg)   21 310 lb (140.6 kg)   21 312 lb 3.2 oz (141.6 kg)        Temp Readings from Last 3 Encounters:   10/15/21 99.1 °F (37.3 °C) (Oral)   21 98.6 °F (37 °C) (Oral)   21 98.4 °F (36.9 °C) (Oral)        BP Readings from Last 3 Encounters:   10/15/21 124/76   21 (!) 142/80   21 (!) 148/82        Pulse Readings from Last 3 Encounters:   10/15/21 92   21 94   21 87      There were no vitals filed for this visit.        Learning Assessment:   :       Learning Assessment 2020 2015 3/26/2014   PRIMARY LEARNER Patient Patient Patient   HIGHEST LEVEL OF EDUCATION - PRIMARY LEARNER  - GRADUATED HIGH SCHOOL OR GED GRADUATED HIGH SCHOOL OR GED   BARRIERS PRIMARY LEARNER - NONE NONE   CO-LEARNER CAREGIVER - No No   PRIMARY LANGUAGE ENGLISH ENGLISH ENGLISH   LEARNER PREFERENCE PRIMARY OTHER (COMMENT) LISTENING VIDEOS   ANSWERED BY self patient patient   RELATIONSHIP SELF SELF SELF        Depression Screening:   :       3 most recent PHQ Screens 2022   PHQ Not Done -   Little interest or pleasure in doing things Nearly every day   Feeling down, depressed, irritable, or hopeless Nearly every day   Total Score PHQ 2 6   Trouble falling or staying asleep, or sleeping too much Nearly every day   Feeling tired or having little energy Nearly every day   Poor appetite, weight loss, or overeating Nearly every day   Feeling bad about yourself - or that you are a failure or have let yourself or your family down Nearly every day   Trouble concentrating on things such as school, work, reading, or watching TV Nearly every day   Moving or speaking so slowly that other people could have noticed; or the opposite being so fidgety that others notice Not at all   Thoughts of being better off dead, or hurting yourself in some way Not at all   PHQ 9 Score 21   How difficult have these problems made it for you to do your work, take care of your home and get along with others Extremely difficult        Fall Risk Assessment:   :       Fall Risk Assessment, last 12 mths 4/19/2022   Able to walk? Yes   Fall in past 12 months? 0   Do you feel unsteady? 1   Are you worried about falling 1   Fall with injury? -        Abuse Screening:   :       Abuse Screening Questionnaire 4/19/2022 4/14/2021 8/9/2019   Do you ever feel afraid of your partner? N N N   Are you in a relationship with someone who physically or mentally threatens you? N N N   Is it safe for you to go home?  Y Y Y        ADL Screening:   :       ADL Assessment 4/19/2022   Feeding yourself No Help Needed   Getting from bed to chair No Help Needed   Getting dressed No Help Needed   Bathing or showering Help Needed   Walk across the room (includes cane/walker) No Help Needed   Using the telphone No Help Needed   Taking your medications No Help Needed   Preparing meals No Help Needed   Managing money (expenses/bills) No Help Needed   Moderately strenuous housework (laundry) Help Needed   Shopping for personal items (toiletries/medicines) No Help Needed   Shopping for groceries No Help Needed   Driving No Help Needed   Climbing a flight of stairs Help Needed   Getting to places beyond walking distances No Help Needed

## 2022-12-24 NOTE — PROGRESS NOTES
Chrissy Lanza is a 63 y.o. female who was seen by synchronous (real-time) audio-video technology on 12/19/2022 for Depression and Pain (Chronic)        Assessment & Plan:   Diagnoses and all orders for this visit:    1. Cellulitis of buttock  Probable cellulitis. If no improvement, use ciclopirox cream.  -     Start amoxicillin-clavulanate (AUGMENTIN) 875-125 mg per tablet; Take 1 Tablet by mouth every twelve (12) hours for 7 days. 2. Chronic pain syndrome  This is a chronic problem that is not changed. Per review of available records and patients , there are not sign of overuse, misuse, diversion, or concerning side effects. Today we reviewed: the risk of overdose, addiction, and dependency  The following changes were made to the patients current treatment plan:  none . 3. Essential hypertension  High today. If still high at next appointment, plan to start on medication. 4. Lymphedema of both lower extremities  She has difficulty leaving her home. Never went to [facility]hedema Clinic. Time Spent: 40 mins on medical record review, history, exam, discussing problems and plan, counseling, and placing orders. Follow-up and Dispositions    Return in about 4 months (around 4/19/2023), or if symptoms worsen or fail to improve, for Chronic pain, HTN.           712  Subjective:     Presents for 4 month follow up evaluation. She has HTN, depression with anxiety, fibromyalgia, chronic pain, osteoarthritis at lumbar spine, morbid obesity, lymphedema, and idiopathic polyneuropathy. Has a place on her bottom that is sore. Started 3 weeks ago after having a fall and \"scooting\" on her bottom to get up. Not getting better after using diaper rash cream and a wound care astringent called Terasil. Feels rough and raised. Was seen at [facility] for this. Not examined according to patient. Diagnosed with fungal infection and prescribed ciclopirox cream. She never filled the prescription.      Working on trying to lose weight. Lost 10 lbs over past 3 months. Uses cane and wheelchair to get around. /91 today. Took 2 OTC blood pressure support vitamin tablets. Chronic Pain Review  Isabel Benedict RTC today to follow up on chronic pain. We discussed her left hip pain, chronic low back pain, and pain at both knees. Significant changes since last visit: none. She is  able to do her normal daily activities. She reports the following adverse side effects: none. Pain worse with standing or walking. Ambulates with a cane. Uses a wheelchair sometimes at home. Takes Tramadol 50 mg 1 tab 1-2 times a day as needed for pain. Least pain over the last week has been 6/10. Worst pain over the last week has been 10/10. Opioid Risk Tool Reviewed: YES  Aberrant behaviors: None. Urine Drug Screen: due for this  Controlled substance agreement on file: YES.  reviewed: yes  Pill count is consistent with her prescription: yes  Concomitant use of a benzodiazepine: no  Active daily MME is 15 mg. Naloxone prescription not warranted. Prior to Admission medications    Medication Sig Start Date End Date Taking? Authorizing Provider   OTHER,NON-FORMULARY, Healthy Blood Pressure Support Suppliment   Yes Provider, Historical   amoxicillin-clavulanate (AUGMENTIN) 875-125 mg per tablet Take 1 Tablet by mouth every twelve (12) hours for 7 days. 12/19/22 12/26/22 Yes David Hassan MD   COLLAGEN by Does Not Apply route. Yes Provider, Historical   ascorbic acid (VITAMIN C PO) Take 1 Tab by mouth daily. Takes 1 tab (1400 mg) daily. Yes Provider, Historical   methyl salicylate/menthol (CECIL HERRERA EX) by Apply Externally route. Yes Provider, Historical   cholecalciferol, vitamin D3, 100 mcg (4,000 unit) cap Take 4,000 Units by mouth daily. Yes Provider, Historical   naproxen sodium 220 mg cap Take  by mouth.    Yes Gayla, MD Ilda     Patient Active Problem List   Diagnosis Code    Essential hypertension I10    Idiopathic peripheral neuropathy G60.9    Osteopenia M85.80    Fibromyalgia M79.7    Generalized anxiety disorder F41.1    Primary osteoarthritis of left hip M16.12    Primary osteoarthritis of both knees M17.0    Degenerative lumbar disc M51.36    Morbid obesity with BMI of 50.0-59.9, adult (HCC) E66.01, Z68.43    Moderate major depression (HCC) F32.1    Cervical radiculopathy M54.12    Lymphedema of both lower extremities I89.0    Mixed hyperlipidemia E78.2    Vitamin D deficiency E55.9    Chronic pain syndrome G89.4    Chronic use of opiate drug for therapeutic purpose Z79.891       ROS    Objective:     Patient-Reported Vitals 12/19/2022   Patient-Reported Weight 314   Patient-Reported Height -   Patient-Reported Pulse 95   Patient-Reported Temperature 98.3   Patient-Reported Systolic  011   Patient-Reported Diastolic 895      General: alert, cooperative, no distress   Mental  status: normal mood, behavior, speech, dress, motor activity, and thought processes, able to follow commands   HENT: NCAT   Neck: no visualized mass   Resp: no respiratory distress   Neuro: no gross deficits   Skin: no discoloration or lesions of concern on visible areas   Psychiatric: normal affect, consistent with stated mood, no evidence of hallucinations     Additional exam findings: obese      We discussed the expected course, resolution and complications of the diagnosis(es) in detail. Medication risks, benefits, costs, interactions, and alternatives were discussed as indicated. I advised her to contact the office if her condition worsens, changes or fails to improve as anticipated. She expressed understanding with the diagnosis(es) and plan. THIS VISIT WAS COMPLETED VIRTUALLY USING MY CHART TELEMEDICINE     Cassie Montelongo, was evaluated through a synchronous (real-time) audio-video encounter. The patient (or guardian if applicable) is aware that this is a billable service, which includes applicable co-pays.  This Virtual Visit was conducted with patient's (and/or legal guardian's) consent. The visit was conducted pursuant to the emergency declaration under the [facility] [address] and the [employer] Act. Patient identification was verified, and a caregiver was present when appropriate. The patient was located at: Home: [place] [phone]  The provider was located at:  [facility]ility [facility] [address] [place] [address]        Kelli Long MD

## 2022-12-29 ENCOUNTER — PATIENT MESSAGE (OUTPATIENT)
Dept: INTERNAL MEDICINE CLINIC | Age: 62
End: 2022-12-29

## 2022-12-29 DIAGNOSIS — M16.12 UNILATERAL PRIMARY OSTEOARTHRITIS, LEFT HIP: ICD-10-CM

## 2023-01-16 RX ORDER — TRAMADOL HYDROCHLORIDE 50 MG/1
50 TABLET ORAL
Qty: 45 TABLET | Refills: 0 | Status: SHIPPED | OUTPATIENT
Start: 2023-01-16 | End: 2023-01-31

## 2023-01-16 NOTE — TELEPHONE ENCOUNTER
PCP: Jonh Lujan MD     Last appt: 12/19/2022     Future Appointments   Date Time Provider Keke Jerez   4/28/2023  2:00 PM Esvin Souza MD [facility] AMB          Requested Prescriptions     Pending Prescriptions Disp Refills    traMADoL (ULTRAM) 50 mg tablet 45 Tablet 0     Sig: Take 1 Tablet by mouth every eight (8) hours as needed for Pain for up to 15 days. Max Daily Amount: 150 mg.

## 2023-01-16 NOTE — TELEPHONE ENCOUNTER
Edward, Generic 1/14/2023 5:58 PM EST    I continue to treat the areas on my buttox and female area with over the counter medication. I seem to be getting a little relief. But there are times when it burns and feels like it's not getting better. I have a hard time reaching the area that bothers me most. As soon as I can have a half way decent day I will try to go back to [facility] for the blood work. I just can't go any further and I can't find a mobile lab that won't charge me . I do need a refill on the Tramadol . if you could send the refill to [facility] I'd appreciate it

## 2023-02-26 ENCOUNTER — PATIENT MESSAGE (OUTPATIENT)
Dept: INTERNAL MEDICINE CLINIC | Age: 63
End: 2023-02-26

## 2023-02-26 DIAGNOSIS — M16.12 UNILATERAL PRIMARY OSTEOARTHRITIS, LEFT HIP: ICD-10-CM

## 2023-02-27 RX ORDER — TRAMADOL HYDROCHLORIDE 50 MG/1
50 TABLET ORAL
Qty: 45 TABLET | Refills: 0 | Status: SHIPPED | OUTPATIENT
Start: 2023-02-27 | End: 2023-03-14

## 2023-02-27 NOTE — TELEPHONE ENCOUNTER
PCP: Pedro Weiner MD     Last appt: 12/19/2022   No future appointments. Requested Prescriptions     Pending Prescriptions Disp Refills    traMADoL (ULTRAM) 50 mg tablet 45 Tablet 0     Sig: Take 1 Tablet by mouth every eight (8) hours as needed for Pain for up to 15 days. Max Daily Amount: 150 mg.

## 2023-02-27 NOTE — TELEPHONE ENCOUNTER
From: Roland Easley  To: Diamond Grajeda MD  Sent: 2/26/2023 11:18 AM EST  Subject: Tramadol refill     , it is time to refill my Tramadol. Would you please send the prescription to [facility] for me? Thank you.

## 2023-04-07 ENCOUNTER — PATIENT MESSAGE (OUTPATIENT)
Dept: INTERNAL MEDICINE CLINIC | Age: 63
End: 2023-04-07

## 2023-05-15 ENCOUNTER — PATIENT MESSAGE (OUTPATIENT)
Facility: CLINIC | Age: 63
End: 2023-05-15

## 2023-05-15 DIAGNOSIS — E78.2 MIXED HYPERLIPIDEMIA: ICD-10-CM

## 2023-05-15 DIAGNOSIS — E55.9 VITAMIN D DEFICIENCY: ICD-10-CM

## 2023-05-15 DIAGNOSIS — E66.01 MORBID OBESITY WITH BMI OF 50.0-59.9, ADULT (HCC): ICD-10-CM

## 2023-05-15 DIAGNOSIS — I10 ESSENTIAL HYPERTENSION: Primary | ICD-10-CM

## 2023-05-15 DIAGNOSIS — G60.9 IDIOPATHIC PERIPHERAL NEUROPATHY: ICD-10-CM

## 2023-05-15 DIAGNOSIS — N95.1 MENOPAUSAL SYMPTOMS: ICD-10-CM

## 2023-05-15 DIAGNOSIS — E83.42 HYPOMAGNESEMIA: ICD-10-CM

## 2023-05-16 DIAGNOSIS — G89.4 CHRONIC PAIN SYNDROME: ICD-10-CM

## 2023-05-17 DIAGNOSIS — E83.42 HYPOMAGNESEMIA: ICD-10-CM

## 2023-05-17 DIAGNOSIS — E66.01 MORBID OBESITY WITH BMI OF 50.0-59.9, ADULT (HCC): ICD-10-CM

## 2023-05-17 DIAGNOSIS — E78.2 MIXED HYPERLIPIDEMIA: ICD-10-CM

## 2023-05-17 DIAGNOSIS — G60.9 IDIOPATHIC PERIPHERAL NEUROPATHY: ICD-10-CM

## 2023-05-17 DIAGNOSIS — N95.1 MENOPAUSAL SYMPTOMS: ICD-10-CM

## 2023-05-17 DIAGNOSIS — I10 ESSENTIAL HYPERTENSION: ICD-10-CM

## 2023-05-17 DIAGNOSIS — E55.9 VITAMIN D DEFICIENCY: ICD-10-CM

## 2023-05-17 RX ORDER — TRAMADOL HYDROCHLORIDE 50 MG/1
50 TABLET ORAL EVERY 6 HOURS PRN
Qty: 45 TABLET | Refills: 0 | Status: SHIPPED | OUTPATIENT
Start: 2023-05-17 | End: 2023-06-27

## 2023-05-18 RX ORDER — COVID-19 ANTIGEN TEST
KIT MISCELLANEOUS
COMMUNITY

## 2023-05-22 NOTE — TELEPHONE ENCOUNTER
Let patient know that I can order a walker for her but a face to face evaluation is needed for a wheelchair order.

## 2023-06-03 ENCOUNTER — TELEPHONE (OUTPATIENT)
Facility: CLINIC | Age: 63
End: 2023-06-03

## 2023-06-03 NOTE — TELEPHONE ENCOUNTER
Patient never read the My Chart message I sent her on 5/17/23: \"Yes, it is still okay to do a virtual video appointment. \" Call her and schedule her for a virtual visit for chronic pain management sometime within the next 2-3 months.

## 2023-06-05 ENCOUNTER — TELEPHONE (OUTPATIENT)
Facility: CLINIC | Age: 63
End: 2023-06-05

## 2023-06-05 NOTE — TELEPHONE ENCOUNTER
----- Message from Jamilah Roth sent at 6/5/2023 12:32 PM EDT -----  Subject: Appointment Request    Reason for Call: Established Patient Appointment needed: Routine Existing   Condition Follow Up    QUESTIONS    Reason for appointment request? No appointments available during search     Additional Information for Provider?  PT NEEDS A VIRTUAL VISIT ASAP AND NO   AVAILABLE APPTS TIL AFTER AUG, PT NEEDS A CHECK UP APPT FOR MEDICATIONS   PLEASE CALL WITH ANY OPENING   ---------------------------------------------------------------------------  --------------  Lalit Craft NDJQ  [phone]; OK to leave message on voicemail  ---------------------------------------------------------------------------  --------------  SCRIPT ANSWERS  COVID Screen: Esthela Monreal

## 2023-06-05 NOTE — TELEPHONE ENCOUNTER
I spoke with the patient this morning and she has been scheduled. Dr. Benito Prescott stated it was okay to schedule her 2 to 3 months out.

## 2023-06-12 DIAGNOSIS — Z79.891 CHRONIC USE OF OPIATE DRUG FOR THERAPEUTIC PURPOSE: ICD-10-CM

## 2023-06-27 DIAGNOSIS — G89.4 CHRONIC PAIN SYNDROME: ICD-10-CM

## 2023-06-27 RX ORDER — TRAMADOL HYDROCHLORIDE 50 MG/1
50 TABLET ORAL EVERY 6 HOURS PRN
Qty: 45 TABLET | Refills: 0 | Status: SHIPPED | OUTPATIENT
Start: 2023-06-27 | End: 2023-07-12

## 2023-08-09 DIAGNOSIS — G89.4 CHRONIC PAIN SYNDROME: Primary | ICD-10-CM

## 2023-08-09 RX ORDER — TRAMADOL HYDROCHLORIDE 50 MG/1
TABLET ORAL
COMMUNITY
Start: 2021-05-04 | End: 2023-08-10 | Stop reason: SDUPTHER

## 2023-08-09 NOTE — TELEPHONE ENCOUNTER
----- Message from Fabian Sin. Raleigh Flores sent at 8/9/2023 11:41 AM EDT -----  Regarding: Refill for Tramadol  Contact: [phone]  Dr Fariha Morris could you please send a refill for my Tramadol to [facility] .?  Thank you

## 2023-08-09 NOTE — TELEPHONE ENCOUNTER
PCP: Elena Barker MD     Last appt:  12/19/2022    Future Appointments   Date Time Provider [address]  [facility]   8/22/2023  1:40 PM Elena Barker MD [facility] AMB          Requested Prescriptions     Pending Prescriptions Disp Refills    traMADol (ULTRAM) 50 MG tablet 45 tablet 0     Sig: Take 1 tablet by mouth every 8 hours as needed for Pain for up to 15 days.  Max Daily Amount: 150 mg

## 2023-08-10 RX ORDER — TRAMADOL HYDROCHLORIDE 50 MG/1
50 TABLET ORAL EVERY 8 HOURS PRN
Qty: 45 TABLET | Refills: 0 | Status: SHIPPED | OUTPATIENT
Start: 2023-08-10 | End: 2023-08-25

## 2023-08-22 ENCOUNTER — TELEMEDICINE (OUTPATIENT)
Facility: CLINIC | Age: 63
End: 2023-08-22
Payer: COMMERCIAL

## 2023-08-22 VITALS
SYSTOLIC BLOOD PRESSURE: 140 MMHG | TEMPERATURE: 98.7 F | WEIGHT: 293 LBS | BODY MASS INDEX: 56.3 KG/M2 | HEART RATE: 56 BPM | DIASTOLIC BLOOD PRESSURE: 89 MMHG | OXYGEN SATURATION: 98 %

## 2023-08-22 DIAGNOSIS — E66.01 MORBID OBESITY WITH BMI OF 50.0-59.9, ADULT (HCC): ICD-10-CM

## 2023-08-22 DIAGNOSIS — E78.2 MIXED HYPERLIPIDEMIA: ICD-10-CM

## 2023-08-22 DIAGNOSIS — E53.8 VITAMIN B12 DEFICIENCY: ICD-10-CM

## 2023-08-22 DIAGNOSIS — M17.0 PRIMARY OSTEOARTHRITIS OF BOTH KNEES: ICD-10-CM

## 2023-08-22 DIAGNOSIS — G89.4 CHRONIC PAIN SYNDROME: ICD-10-CM

## 2023-08-22 DIAGNOSIS — I89.0 LYMPHEDEMA OF BOTH LOWER EXTREMITIES: ICD-10-CM

## 2023-08-22 DIAGNOSIS — E55.9 VITAMIN D DEFICIENCY: ICD-10-CM

## 2023-08-22 DIAGNOSIS — I10 ESSENTIAL HYPERTENSION: Primary | ICD-10-CM

## 2023-08-22 DIAGNOSIS — M16.12 PRIMARY OSTEOARTHRITIS OF LEFT HIP: ICD-10-CM

## 2023-08-22 DIAGNOSIS — F32.1 MODERATE MAJOR DEPRESSION (HCC): ICD-10-CM

## 2023-08-22 DIAGNOSIS — M79.10 MYALGIA: ICD-10-CM

## 2023-08-22 DIAGNOSIS — N95.1 MENOPAUSAL SYMPTOMS: ICD-10-CM

## 2023-08-22 PROCEDURE — 3079F DIAST BP 80-89 MM HG: CPT | Performed by: INTERNAL MEDICINE

## 2023-08-22 PROCEDURE — 99215 OFFICE O/P EST HI 40 MIN: CPT | Performed by: INTERNAL MEDICINE

## 2023-08-22 PROCEDURE — 3077F SYST BP >= 140 MM HG: CPT | Performed by: INTERNAL MEDICINE

## 2023-08-22 RX ORDER — SEMAGLUTIDE 0.25 MG/.5ML
0.25 INJECTION, SOLUTION SUBCUTANEOUS
Qty: 2 ML | Refills: 0 | Status: SHIPPED | OUTPATIENT
Start: 2023-08-22

## 2023-08-22 SDOH — ECONOMIC STABILITY: FOOD INSECURITY: WITHIN THE PAST 12 MONTHS, YOU WORRIED THAT YOUR FOOD WOULD RUN OUT BEFORE YOU GOT MONEY TO BUY MORE.: SOMETIMES TRUE

## 2023-08-22 SDOH — ECONOMIC STABILITY: FOOD INSECURITY: WITHIN THE PAST 12 MONTHS, THE FOOD YOU BOUGHT JUST DIDN'T LAST AND YOU DIDN'T HAVE MONEY TO GET MORE.: OFTEN TRUE

## 2023-08-22 SDOH — ECONOMIC STABILITY: HOUSING INSECURITY
IN THE LAST 12 MONTHS, WAS THERE A TIME WHEN YOU DID NOT HAVE A STEADY PLACE TO SLEEP OR SLEPT IN A SHELTER (INCLUDING NOW)?: NO

## 2023-08-22 SDOH — ECONOMIC STABILITY: TRANSPORTATION INSECURITY
IN THE PAST 12 MONTHS, HAS LACK OF TRANSPORTATION KEPT YOU FROM MEETINGS, WORK, OR FROM GETTING THINGS NEEDED FOR DAILY LIVING?: NO

## 2023-08-22 SDOH — ECONOMIC STABILITY: INCOME INSECURITY: HOW HARD IS IT FOR YOU TO PAY FOR THE VERY BASICS LIKE FOOD, HOUSING, MEDICAL CARE, AND HEATING?: SOMEWHAT HARD

## 2023-08-22 ASSESSMENT — PATIENT HEALTH QUESTIONNAIRE - PHQ9
2. FEELING DOWN, DEPRESSED OR HOPELESS: 3
SUM OF ALL RESPONSES TO PHQ QUESTIONS 1-9: 18
8. MOVING OR SPEAKING SO SLOWLY THAT OTHER PEOPLE COULD HAVE NOTICED. OR THE OPPOSITE, BEING SO FIGETY OR RESTLESS THAT YOU HAVE BEEN MOVING AROUND A LOT MORE THAN USUAL: 0
7. TROUBLE CONCENTRATING ON THINGS, SUCH AS READING THE NEWSPAPER OR WATCHING TELEVISION: 3
SUM OF ALL RESPONSES TO PHQ QUESTIONS 1-9: 18
SUM OF ALL RESPONSES TO PHQ QUESTIONS 1-9: 18
10. IF YOU CHECKED OFF ANY PROBLEMS, HOW DIFFICULT HAVE THESE PROBLEMS MADE IT FOR YOU TO DO YOUR WORK, TAKE CARE OF THINGS AT HOME, OR GET ALONG WITH OTHER PEOPLE: 2
1. LITTLE INTEREST OR PLEASURE IN DOING THINGS: 3
3. TROUBLE FALLING OR STAYING ASLEEP: 1
SUM OF ALL RESPONSES TO PHQ QUESTIONS 1-9: 18
5. POOR APPETITE OR OVEREATING: 2
SUM OF ALL RESPONSES TO PHQ9 QUESTIONS 1 & 2: 6
9. THOUGHTS THAT YOU WOULD BE BETTER OFF DEAD, OR OF HURTING YOURSELF: 0
6. FEELING BAD ABOUT YOURSELF - OR THAT YOU ARE A FAILURE OR HAVE LET YOURSELF OR YOUR FAMILY DOWN: 3
4. FEELING TIRED OR HAVING LITTLE ENERGY: 3

## 2023-08-23 DIAGNOSIS — M79.10 MYALGIA: ICD-10-CM

## 2023-08-23 DIAGNOSIS — I10 ESSENTIAL HYPERTENSION: ICD-10-CM

## 2023-08-23 DIAGNOSIS — E55.9 VITAMIN D DEFICIENCY: ICD-10-CM

## 2023-08-23 DIAGNOSIS — N95.1 MENOPAUSAL SYMPTOMS: ICD-10-CM

## 2023-08-23 DIAGNOSIS — F32.1 MODERATE MAJOR DEPRESSION (HCC): ICD-10-CM

## 2023-08-23 DIAGNOSIS — E78.2 MIXED HYPERLIPIDEMIA: ICD-10-CM

## 2023-08-23 DIAGNOSIS — E53.8 VITAMIN B12 DEFICIENCY: ICD-10-CM

## 2023-08-23 DIAGNOSIS — G89.4 CHRONIC PAIN SYNDROME: ICD-10-CM

## 2023-08-24 NOTE — TELEPHONE ENCOUNTER
8/24/2023       RE: Tia Pablo  [address] [facility] [address]       Dear Colleague,    Thank you for referring your patient, Tia Pablo, to the [facility] PHYSICAL MEDICINE AND REHABILITATION CLINIC [place] [facility]. Please see a copy of my visit note below.      [facility]     PM&R CLINIC NOTE  BOTULINUM TOXIN PROCEDURE      HPI  Chief Complaint   Patient presents with    RECHECK     Botox injections confirmed with patient     Tia Pablo is a 59 year old female with a history of headaches and generalized dystonia with pain who presents to clinic for botulinum toxin injections for management of chronic migraine headaches, cervical and oromandibular dystonia.     SINCE LAST VISIT  Tia Pablo was last seen here in clinic on 5/24/23, at which time she received 450 units of Botox. .      Patient denies new medical diagnoses, illnesses, hospitalizations, emergency room visits, and injuries since the previous injection with botulinum neurotoxin.       Had TPIs 7/24/23 with Dr. Edwards in pain clinic using bupi only (Bilateral trapezius, rhomboids, and levator scapulae) with good results.        RESPONSE TO PREVIOUS TREATMENT    Side effects: She always has some mild jaw weakness and difficulty chewing for about a month after the injections    1.  Headache frequency during this injection cycle: 1-3 migraine headache day per month when Botox is in effect, but the Botox only seems to work for 7 weeks and then she experiences 4-8 migraine headache days per month and daily milder headaches. This is compared to her baseline headache frequency of 30 headache days per month. - she reported the exact same response this past cycle     2.  Headache duration during this injection cycle:  Headache duration is typically no longer than a few hours with Relpax, but if the Relpax does not work right away, she has to  Per Dr. Dyllan Rajput verbal order read back orders placed for Dr Missy Love referral. "take two doses and the headaches last much longer. Patient reports no episodes of multiple day headaches during this injection cycle.     3.  Headache intensity during this injection cycle:    A.  5/10  =  Typical pain level.  B.  6-7/10  =  Worst pain level.  C.  2/10  =  Lowest pain level.    4.  Change in headache medication usage during this injection cycle:  (For Example:  Able to decrease use of oral pain medications.) She is only taking her migraine headache medication about once per month, whereas she was taking it much more frequently prior to Botox. She also takes robaxin as needed for more severe headaches     5.  ER Visits During This Injection Cycle: None.     6.  Functional Performance:  Change in ADL's, social interaction, days lost from work, etc. Patient reports being able to more fully participate in social and family activities and responsibilities as headache symptoms have improved. She is overall a lot more productive and can \"get more done\" when botox is working.     Dystonia Improvement (neck and jaw): Yes.  Percent Improvement: 75 %    Duration of Benefit:  7 weeks and followed by a gradual reduction in benefit - similar to last cycle. Her jaw clenching gets severe a few weeks before her appointment.       PHYSICAL EXAM  VS: There were no vitals taken for this visit.   GEN: Pleasant and cooperative, in no acute distress  HEENT: No facial asymmetry  NECK: Involuntary right rotation with hypertonicity noted in right anterior neck musculature and upper trapezius.     ALLERGIES  Allergies   Allergen Reactions    Bee Venom Anaphylaxis    Erythromycin Hives and Itching    Seasonal Allergies        CURRENT MEDICATIONS    Current Outpatient Medications:     buPROPion (WELLBUTRIN XL) 150 MG 24 hr tablet, TAKE 1 TABLET BY MOUTH EVERY MORNING, Disp: 90 tablet, Rfl: 1    buPROPion (WELLBUTRIN XL) 300 MG 24 hr tablet, Take 1 tablet (300 mg) by mouth every morning, Disp: 90 tablet, Rfl: 1    chlorthalidone " (HYGROTON) 25 MG tablet, TAKE 1 TABLET BY MOUTH EVERY DAY, Disp: 90 tablet, Rfl: 1    clonazePAM (KLONOPIN) 0.5 MG tablet, Take 1 tablet (0.5 mg) by mouth 3 times daily Take 1 tab in the evening before bed. Okay to take up to 2 tabs as needed in the day., Disp: 90 tablet, Rfl: 5    eletriptan (RELPAX) 40 MG tablet, TAKE 1 TABLET (40 MG) BY MOUTH AT ONSET OF HEADACHE FOR MIGRAINE MAY REPEAT IN 2 HOURS. MAX 2 TABLETS/24 HOURS., Disp: 12 tablet, Rfl: 3    EPINEPHrine (ANY BX GENERIC EQUIV) 0.3 MG/0.3ML injection 2-pack, PLEASE SEE ATTACHED FOR DETAILED DIRECTIONS, Disp: , Rfl:     escitalopram (LEXAPRO) 10 MG tablet, Take 2 tablets (20 mg) by mouth daily, Disp: 180 tablet, Rfl: 1    losartan (COZAAR) 50 MG tablet, TAKE 1 TABLET BY MOUTH EVERY DAY, Disp: 90 tablet, Rfl: 0    methocarbamol (ROBAXIN) 500 MG tablet, Take 0.5-1 tablets (250-500 mg) by mouth 4 times daily as needed for muscle spasms, Disp: 40 tablet, Rfl: 0    tretinoin (RETIN-A) 0.05 % external cream, Apply pea sized amount at bedtime., Disp: 45 g, Rfl: 3    Urea 40 % CREA, Apply daily at bedtime to feet as needed., Disp: 85 g, Rfl: 4    Current Facility-Administered Medications:     botulinum toxin type A (BOTOX) 100 units injection 600 Units, 600 Units, Intramuscular, Q90 Days, Angela Franz MD, 450 Units at 23 1251    botulinum toxin type A (BOTOX) 100 units injection 600 Units, 600 Units, Intramuscular, Q90 Days, Simi Vazquez MD, 450 Units at 23 1802       BOTULINUM NEUROTOXIN INJECTION PROCEDURES    VERIFICATION OF PATIENT IDENTIFICATION AND PROCEDURE     Initials   Patient Name PS   Patient  PS   Procedure Verified by: PS     Prior to the start of the procedure and with procedural staff participation, I verbally confirmed the patient s identity using two indicators, relevant allergies, that the procedure was appropriate and matched the consent or emergent situation, and that the correct equipment/implants were available.  Immediately prior to starting the procedure I conducted the Time Out with the procedural staff and re-confirmed the patient s name, procedure, and site/side. (The Joint Commission universal protocol was followed.)  Yes    Sedation (Moderate or Deep): None    ABOVE ASSESSMENTS PERFORMED BY    Angela Franz MD      INDICATIONS FOR PROCEDURES  Tia Pablo is a 59 year old patient with involuntary muscle spasms and pain secondary to the diagnosis of oromandibular/cervical dystonia and chronic migraine headaches. Her baseline symptoms have been recalcitrant to oral medications and conservative therapy.  She is here today for reinjection with Botox.    GOAL OF PROCEDURE  The goal of this procedure is to increase active range of motion, improve volitional motor control, decrease pain  and enhance functional independence.      TOTAL DOSE ADMINISTERED  Dose Administered:  450 units  Botox (Botulinum Toxin Type A) see below   Unavoidable Drug Waste: Yes  Amount of drug waste (mL): 50 units Botox.  Reason for waste:  Single use vial  Diluent Used:  Preservative Free Normal Saline  Total Volume of Diluent Used:  5 ml  NDC #: Botox 100u ([address]-69)      CONSENT  The risks, benefits, and treatment options were discussed with Tia Pablo and she agreed to proceed.    Written consent was obtained by PS.     EQUIPMENT USED  Needle-35mm stimulating/recording  Needle-30 gauge  EMG/NCS Machine    SKIN PREPARATION  Skin preparation was performed using an alcohol wipe.    GUIDANCE DESCRIPTION  Electro-myographic guidance was necessary throughout the procedure to accurately identify all areas of dystonic muscles while avoiding injection of non-dystonic muscles, neighboring nerves and nearby vascular structures.     AREA/MUSCLE INJECTED: 450 UNITS BOTOX = TOTAL DOSE     1. NECK MUSCLES: 145 UNITS BOTOX = TOTAL DOSE, 1:1 DILUTION     Right Upper Trapezius - 20 units of Botox at 4 site/s.   Left Upper Trapezius - 20 units of Botox at  4 site/s.      Right Splenius Capitis - 5 units of Botox at 1 site/s.   Left Splenius Capitis - 5 units of Botox at 1 site/s.      Right Auricularis - 15 units of Botox at 3 site/s (anterior, superior & posterior).   Left Auricularis - 15 units of Botox at 3 site/s (anterior, superior & posterior).     Right occipitalis - 15 units of Botox at 3 site/s.   Left occipitalis - 15 units of Botox at 3 site/s.     Right Sternocleidomastoid - 10 units of Botox at 1 site/s.    Right Middle Scalene - 15 units of Botox at 1 site/s.     Right Platysma - 5 units of Botox at 2 site/s.  Left Platysma - 5 units of Botox at 2 site/s.      2. UPPER EXTREMITY & TRUNK MUSCLES: 90 UNITS BOTOX = TOTAL DOSE, 1:1 DILUTION     Right Levator scapula - 25 units of Botox at 2 site/s.   Left Levator scapula - 25 units of Botox at 2 site/s.      Right Rhomboid - 20 units of Botox at 1 site/s.    Left Rhomboid - 20 units of Botox at 3 site/s.       3. JAW MUSCLES: 140 UNITS BOTOX = TOTAL DOSE, 1:1 DILUTION      Right Temporalis - 60 units of Botox at 5 site/s.               Left Temporalis - 60 units of Botox at 5 site/s.     Right Masseter - 10 units of Botox at 1 site/s.    Left Masseter - 10 units of Botox at 1 site/s.     4. FACIAL & SCALP MUSCLES: 75 UNITS BOTOX = TOTAL DOSE, 2:1 DILUTION     Right Frontalis - 15 units of Botox at 3 site/s (2 middle & 1 hairline).    Left Frontalis - 15 units of Botox at 3 site/s (2 middle & 1 hairline).     Procerus - 5 units of Botox at 1 site/s.       Right  - 5 units of Botox at 1 site/s.    Left  - 5 units of Botox at 1 site/s.      Right Occipitalis - 15 units of Botox at 1 site/s.    Left Occipitalis - 15 units of Botox at 1 site/s.       RESPONSE TO PROCEDURE  Tia Pablo tolerated the procedure well and there were no immediate complications. She was allowed to recover for an appropriate period of time and was discharged home in stable condition.    ASSESSMENT AND PLAN    Botulinum toxin injections: No changes made to Botox dose today, however Botox was distributed to avoid jaw weakness. Patient will continue to monitor response to Botox and report at next appointment.   Referrals: None.  Follow up: Tia Pablo was rescheduled for the next series of injections in 12 weeks, at which time we will evaluate response to today's injections. she may call the clinic prior with any questions or concerns prior to the next appointment.         Again, thank you for allowing me to participate in the care of your patient.      Sincerely,    Angela Franz MD

## 2023-08-30 ENCOUNTER — PATIENT MESSAGE (OUTPATIENT)
Facility: CLINIC | Age: 63
End: 2023-08-30

## 2023-08-30 DIAGNOSIS — I89.0 LYMPHEDEMA OF BOTH LOWER EXTREMITIES: Primary | ICD-10-CM

## 2023-08-31 NOTE — TELEPHONE ENCOUNTER
From: eLilani Katz  To: Dr. Whitney Sevilla: 8/30/2023 7:13 PM EDT  Subject: At home lab draw    Charlette Rey I am following up to see if you have sent the order for the at home lab draw to [facility] as I haven't heard anything from them yet and also wanted to check on the prescription for the [facility] that we discussed.

## 2023-08-31 NOTE — TELEPHONE ENCOUNTER
Karen: Did you fax the lab order for this patient to [facility]? The order can be found in \"Other Orders\". It was to be faxed to the number she provided in her 6/29/23 My Chart message.

## 2023-09-19 DIAGNOSIS — G89.4 CHRONIC PAIN SYNDROME: ICD-10-CM

## 2023-09-19 RX ORDER — TRAMADOL HYDROCHLORIDE 50 MG/1
50 TABLET ORAL EVERY 8 HOURS PRN
Qty: 45 TABLET | Refills: 0 | Status: SHIPPED | OUTPATIENT
Start: 2023-09-19 | End: 2023-10-04

## 2023-09-22 DIAGNOSIS — G89.4 CHRONIC PAIN SYNDROME: ICD-10-CM

## 2023-09-22 RX ORDER — TRAMADOL HYDROCHLORIDE 50 MG/1
50 TABLET ORAL EVERY 8 HOURS PRN
Qty: 45 TABLET | Refills: 0 | OUTPATIENT
Start: 2023-09-22 | End: 2023-10-07

## 2023-09-26 DIAGNOSIS — G89.4 CHRONIC PAIN SYNDROME: ICD-10-CM

## 2023-09-26 RX ORDER — TRAMADOL HYDROCHLORIDE 50 MG/1
50 TABLET ORAL EVERY 8 HOURS PRN
Qty: 45 TABLET | Refills: 0 | Status: SHIPPED | OUTPATIENT
Start: 2023-09-26 | End: 2023-10-11

## 2023-10-10 ENCOUNTER — PATIENT MESSAGE (OUTPATIENT)
Facility: CLINIC | Age: 63
End: 2023-10-10

## 2023-10-10 DIAGNOSIS — M16.12 PRIMARY OSTEOARTHRITIS OF LEFT HIP: ICD-10-CM

## 2023-10-10 DIAGNOSIS — M17.0 PRIMARY OSTEOARTHRITIS OF BOTH KNEES: ICD-10-CM

## 2023-10-10 DIAGNOSIS — F32.1 MODERATE MAJOR DEPRESSION (HCC): ICD-10-CM

## 2023-10-10 DIAGNOSIS — I10 ESSENTIAL HYPERTENSION: Primary | ICD-10-CM

## 2023-10-10 DIAGNOSIS — F41.1 GENERALIZED ANXIETY DISORDER: ICD-10-CM

## 2023-10-10 DIAGNOSIS — M51.36 DEGENERATIVE LUMBAR DISC: ICD-10-CM

## 2023-10-10 DIAGNOSIS — I89.0 LYMPHEDEMA OF BOTH LOWER EXTREMITIES: ICD-10-CM

## 2023-10-31 ENCOUNTER — TELEPHONE (OUTPATIENT)
Facility: CLINIC | Age: 63
End: 2023-10-31

## 2023-10-31 NOTE — TELEPHONE ENCOUNTER
Nikloai Westbrook from [employer] called wanting to know if it was possible to get pt chart notes from august for mobility chair.    Phone: [phone] [phone]  fax: [phone]

## 2023-11-02 NOTE — TELEPHONE ENCOUNTER
Faxed new order to [facility] with the success it went through. Will send new order through [employer].

## 2023-11-02 NOTE — TELEPHONE ENCOUNTER
A new order for home health has been placed and printed. Please fax to: [facility] , fax# [phone]. Patient wants a copy of the order sent to her through My Chart. Ask Delgado Brewer to assist with this.

## 2023-11-02 NOTE — TELEPHONE ENCOUNTER
From: Laura Hobson  To: Dr. Rishi Vargas: 10/10/2023 12:10 PM EDT  Subject: At home lab draw with [facility] [address] [address] Pedro House, I just spoke with [facility] to follow up on the revised order that I was told that you sent in to them regarding them coming out to my home to do a blood draw. The person I spoke with that handles that said they received the same exact order that you sent in the first time. The order was supposed to have been revised to include the reason I need in home health care ( not just for blood draw)to justify for insurance purposes. the  y said there was nothing indicating why I'm homebound (not mobile) what keeps me in my home (specifics) they said severe osteoarthritis should allow them to come out even if no other pt ect is done. Lymphedema by itself will not be enough as they don't have a specialist there and that would be needed. My osteoarthritis conditions are: slipped disk L4-L5 Bulging Disk L2-L3, Herniated Disk T12-L1, Degenerate Disk disease L2-L3 all the way to L5-S1, cervical radiculpathy- degenerate changes C5-6 & c6-7 , severe osteoarthritis of left hip, osteoarthritis both knees,lateral meniscus tear Left leg. These are the main reasons I am unable to go anywhere as well as my weight. Could you please revise the order to include these specifics so that it will be clear as to why I need this service. Also I'm not sure if it was specified on the order but they need to know I need the urinalysis as well . Could you send a copy of the order that you sent ?

## 2023-11-03 ENCOUNTER — TELEPHONE (OUTPATIENT)
Facility: CLINIC | Age: 63
End: 2023-11-03

## 2023-11-03 NOTE — TELEPHONE ENCOUNTER
----- Message from Deysi Kassandra sent at 11/3/2023  2:27 PM EDT -----  Subject: Message to Provider    QUESTIONS  Information for Provider? Alex Bolanos called in regarding pt's order for home   health care; they need copies of the appointment notes from pt's last apt. Please fax notes to fax# [phone] to attn? Margi Jorgensen. If there are any   questions, [facility] directly phone# is [phone].  ---------------------------------------------------------------------------  --------------  Nicanor Rader INFO  [phone]; OK to leave message on voicemail  ---------------------------------------------------------------------------  --------------  SCRIPT ANSWERS  Relationship to Patient? Covered Entity  Covered Entity Type? Home Health Care? Representative Name?  [address] [place] [address] Nursing

## 2023-11-08 ENCOUNTER — PATIENT MESSAGE (OUTPATIENT)
Facility: CLINIC | Age: 63
End: 2023-11-08

## 2023-11-08 DIAGNOSIS — G89.4 CHRONIC PAIN SYNDROME: ICD-10-CM

## 2023-11-09 ENCOUNTER — PATIENT MESSAGE (OUTPATIENT)
Facility: CLINIC | Age: 63
End: 2023-11-09

## 2023-11-09 DIAGNOSIS — G89.4 CHRONIC PAIN SYNDROME: ICD-10-CM

## 2023-11-09 DIAGNOSIS — N39.0 URINARY TRACT INFECTION WITHOUT HEMATURIA, SITE UNSPECIFIED: Primary | ICD-10-CM

## 2023-11-09 NOTE — TELEPHONE ENCOUNTER
PCP: Christina Maynard MD     Last appt:  8/22/2023    No future appointments. Requested Prescriptions     Pending Prescriptions Disp Refills    traMADol (ULTRAM) 50 MG tablet 45 tablet 0     Sig: Take 1 tablet by mouth every 8 hours as needed for Pain for up to 15 days.  Max Daily Amount: 150 mg

## 2023-11-09 NOTE — TELEPHONE ENCOUNTER
PCP: Dedra Sandhu MD     Last appt:  8/22/2023    No future appointments.       Requested Prescriptions     Pending Prescriptions Disp Refills    traMADol (ULTRAM) 50 MG tablet [Pharmacy Med Name: TRAMADOL HCL 50 MG TABLET] 45 tablet 0     Sig: Take 1 tablet by mouth every 8 hours as needed for Pain for up to 15 days. Max Daily Amount: 150 mg

## 2023-11-10 ENCOUNTER — TELEPHONE (OUTPATIENT)
Facility: CLINIC | Age: 63
End: 2023-11-10

## 2023-11-10 ENCOUNTER — CLINICAL DOCUMENTATION (OUTPATIENT)
Facility: CLINIC | Age: 63
End: 2023-11-10

## 2023-11-10 DIAGNOSIS — G89.4 CHRONIC PAIN SYNDROME: ICD-10-CM

## 2023-11-10 RX ORDER — TRAMADOL HYDROCHLORIDE 50 MG/1
50 TABLET ORAL EVERY 8 HOURS PRN
Qty: 45 TABLET | Refills: 0 | OUTPATIENT
Start: 2023-11-10 | End: 2023-11-25

## 2023-11-10 RX ORDER — TRAMADOL HYDROCHLORIDE 50 MG/1
50 TABLET ORAL EVERY 8 HOURS PRN
Qty: 45 TABLET | Refills: 0 | Status: SHIPPED | OUTPATIENT
Start: 2023-11-10 | End: 2023-12-11

## 2023-11-10 RX ORDER — NITROFURANTOIN 25; 75 MG/1; MG/1
100 CAPSULE ORAL 2 TIMES DAILY
Qty: 14 CAPSULE | Refills: 0 | Status: SHIPPED | OUTPATIENT
Start: 2023-11-10 | End: 2023-11-17

## 2023-11-10 RX ORDER — TRAMADOL HYDROCHLORIDE 50 MG/1
50 TABLET ORAL EVERY 8 HOURS PRN
Qty: 45 TABLET | Refills: 0 | Status: SHIPPED | OUTPATIENT
Start: 2023-11-10 | End: 2023-11-10 | Stop reason: SDUPTHER

## 2023-11-10 NOTE — TELEPHONE ENCOUNTER
Spoke to pt verified name and . I let pt know blood work she sent to us from [facility] has been sent to Dr. Hitesh Bright for review. Dr. Hitesh Bright will get in contact with her as soon as she reviews it.

## 2023-11-10 NOTE — TELEPHONE ENCOUNTER
PCP: Dedra Sandhu MD     Last appt:  8/22/2023    No future appointments.       Requested Prescriptions     Pending Prescriptions Disp Refills    traMADol (ULTRAM) 50 MG tablet 45 tablet 0     Sig: Take 1 tablet by mouth every 8 hours as needed for Pain for up to 15 days. Max Daily Amount: 150 mg

## 2023-11-10 NOTE — TELEPHONE ENCOUNTER
Pt has gotten lab results back and has a uti, wants to know if provider can prescribe her an antibiotic

## 2023-11-10 NOTE — TELEPHONE ENCOUNTER
From: Nik Romero  To: Dr. Mims Bis: 11/9/2023 9:27 PM EST  Subject: Lab Test results     I hope the attachment is the lab test results I couldn't tell when I attached them.  Based on what I see I am concerned that I have a uti

## 2023-11-14 ENCOUNTER — PATIENT MESSAGE (OUTPATIENT)
Facility: CLINIC | Age: 63
End: 2023-11-14

## 2023-11-17 NOTE — TELEPHONE ENCOUNTER
From: Rajeev Mead  To: Dr. Augusto Mckinnon: 11/14/2023 1:17 AM EST  Subject: CBD gummies    Dr Carlos Gardner attached a picture of CBD gummies I've been using for anxiety and help in sleeping. I hope this doesn't interfere with the drug compliance screen. I did discuss with you sometime ago that I obtained my medical cannabis card but due to the cost I was not able to renew it.  I just thought about this and thought I would let you know in case theirs an issue

## 2023-11-22 ENCOUNTER — CLINICAL DOCUMENTATION (OUTPATIENT)
Facility: CLINIC | Age: 63
End: 2023-11-22

## 2023-12-04 ENCOUNTER — TELEPHONE (OUTPATIENT)
Facility: CLINIC | Age: 63
End: 2023-12-04

## 2023-12-04 NOTE — TELEPHONE ENCOUNTER
----- Message from Jerson Barry sent at 12/4/2023  9:24 AM EST -----  Subject: Message to Provider    QUESTIONS  Information for Provider? PT scott said they were discharging the patient   from their care because her insurance changed on 12/1  ---------------------------------------------------------------------------  --------------  [address]  [phone]; OK to leave message on voicemail  ---------------------------------------------------------------------------  --------------  SCRIPT ANSWERS  Relationship to Patient? Covered Entity  Covered Entity Type? Physical Therapy Office? Representative Name?  Daryn Amaya, [facility]

## 2023-12-05 LAB — HBA1C MFR BLD HPLC: 5.6 %

## 2023-12-07 ENCOUNTER — TELEPHONE (OUTPATIENT)
Facility: CLINIC | Age: 63
End: 2023-12-07

## 2023-12-07 DIAGNOSIS — N30.00 ACUTE CYSTITIS WITHOUT HEMATURIA: Primary | ICD-10-CM

## 2023-12-07 NOTE — TELEPHONE ENCOUNTER
Spoke to pt verified name and . Pt stated she was worried about a UTI. She got her urine results back and have attached them to a [employer] message. Pt wanted to know if she needed another antibiotic or not?

## 2023-12-08 RX ORDER — CEPHALEXIN 250 MG/1
250 CAPSULE ORAL 4 TIMES DAILY
Qty: 28 CAPSULE | Refills: 0 | Status: SHIPPED | OUTPATIENT
Start: 2023-12-08 | End: 2023-12-15

## 2023-12-08 NOTE — TELEPHONE ENCOUNTER
UA results reviewed. I have sent in a prescription for a different antibiotic, cephalexin, to [facility] in [facility]. Send My Chart message to patient to let her know.

## 2023-12-09 DIAGNOSIS — G89.4 CHRONIC PAIN SYNDROME: ICD-10-CM

## 2023-12-11 RX ORDER — TRAMADOL HYDROCHLORIDE 50 MG/1
50 TABLET ORAL EVERY 8 HOURS PRN
Qty: 45 TABLET | Refills: 0 | Status: SHIPPED | OUTPATIENT
Start: 2023-12-11 | End: 2023-12-26

## 2024-01-07 DIAGNOSIS — G89.4 CHRONIC PAIN SYNDROME: ICD-10-CM

## 2024-01-08 NOTE — TELEPHONE ENCOUNTER
PCP: Dedra Sandhu MD     Last appt:  8/22/2023      Future Appointments   Date Time Provider Department Center   3/4/2024  8:10 AM Dedra Sandhu MD [facility] AMB          Requested Prescriptions     Pending Prescriptions Disp Refills    traMADol (ULTRAM) 50 MG tablet [Pharmacy Med Name: TRAMADOL HCL 50 MG TABLET] 45 tablet 0     Sig: Take 1 tablet by mouth every 8 hours as needed for Pain for up to 15 days. Max Daily Amount: 150 mg

## 2024-01-10 RX ORDER — TRAMADOL HYDROCHLORIDE 50 MG/1
50 TABLET ORAL EVERY 8 HOURS PRN
Qty: 45 TABLET | Refills: 0 | Status: SHIPPED | OUTPATIENT
Start: 2024-01-10 | End: 2024-01-25

## 2024-02-07 DIAGNOSIS — G89.4 CHRONIC PAIN SYNDROME: ICD-10-CM

## 2024-02-08 RX ORDER — TRAMADOL HYDROCHLORIDE 50 MG/1
50 TABLET ORAL EVERY 8 HOURS PRN
Qty: 45 TABLET | Refills: 0 | Status: SHIPPED | OUTPATIENT
Start: 2024-02-08 | End: 2024-02-23

## 2024-02-08 NOTE — TELEPHONE ENCOUNTER
PCP: Dedra Sandhu MD     Last appt:  8/22/2023      Future Appointments   Date Time Provider Department Center   3/4/2024  8:10 AM Dedra Sandhu MD [facility] AMB          Requested Prescriptions     Pending Prescriptions Disp Refills    traMADol (ULTRAM) 50 MG tablet [Pharmacy Med Name: TRAMADOL HCL 50 MG TABLET] 45 tablet 0     Sig: Take 1 tablet by mouth every 8 hours as needed for Pain for up to 15 days. Max Daily Amount: 150 mg

## 2024-04-01 DIAGNOSIS — G89.4 CHRONIC PAIN SYNDROME: ICD-10-CM

## 2024-04-02 RX ORDER — TRAMADOL HYDROCHLORIDE 50 MG/1
50 TABLET ORAL EVERY 8 HOURS PRN
Qty: 45 TABLET | Refills: 0 | Status: SHIPPED | OUTPATIENT
Start: 2024-04-02 | End: 2024-04-17

## 2024-04-03 DIAGNOSIS — G89.4 CHRONIC PAIN SYNDROME: ICD-10-CM

## 2024-04-03 RX ORDER — TRAMADOL HYDROCHLORIDE 50 MG/1
50 TABLET ORAL EVERY 8 HOURS PRN
Qty: 45 TABLET | Refills: 0 | Status: CANCELLED | OUTPATIENT
Start: 2024-04-03 | End: 2024-04-18

## 2024-05-28 ENCOUNTER — TELEMEDICINE (OUTPATIENT)
Facility: CLINIC | Age: 64
End: 2024-05-28

## 2024-05-28 DIAGNOSIS — E78.2 MIXED HYPERLIPIDEMIA: ICD-10-CM

## 2024-05-28 DIAGNOSIS — E66.01 MORBID OBESITY WITH BMI OF 50.0-59.9, ADULT (HCC): ICD-10-CM

## 2024-05-28 DIAGNOSIS — G60.9 IDIOPATHIC PERIPHERAL NEUROPATHY: ICD-10-CM

## 2024-05-28 DIAGNOSIS — I10 ESSENTIAL HYPERTENSION: Primary | ICD-10-CM

## 2024-05-28 DIAGNOSIS — G89.4 CHRONIC PAIN SYNDROME: ICD-10-CM

## 2024-05-28 DIAGNOSIS — B37.2 INTERTRIGINOUS CANDIDIASIS: ICD-10-CM

## 2024-05-28 DIAGNOSIS — F32.1 MODERATE MAJOR DEPRESSION (HCC): ICD-10-CM

## 2024-05-28 PROCEDURE — 99214 OFFICE O/P EST MOD 30 MIN: CPT | Performed by: INTERNAL MEDICINE

## 2024-05-28 RX ORDER — NYSTATIN 100000 U/G
CREAM TOPICAL
Qty: 30 EACH | Refills: 2 | Status: SHIPPED | OUTPATIENT
Start: 2024-05-28

## 2024-05-28 RX ORDER — TRAMADOL HYDROCHLORIDE 50 MG/1
50 TABLET ORAL EVERY 8 HOURS PRN
Qty: 45 TABLET | Refills: 0 | Status: SHIPPED | OUTPATIENT
Start: 2024-05-28 | End: 2024-06-12

## 2024-05-28 ASSESSMENT — PATIENT HEALTH QUESTIONNAIRE - PHQ9
3. TROUBLE FALLING OR STAYING ASLEEP: NEARLY EVERY DAY
SUM OF ALL RESPONSES TO PHQ QUESTIONS 1-9: 21
SUM OF ALL RESPONSES TO PHQ9 QUESTIONS 1 & 2: 6
9. THOUGHTS THAT YOU WOULD BE BETTER OFF DEAD, OR OF HURTING YOURSELF: NOT AT ALL
7. TROUBLE CONCENTRATING ON THINGS, SUCH AS READING THE NEWSPAPER OR WATCHING TELEVISION: NEARLY EVERY DAY
4. FEELING TIRED OR HAVING LITTLE ENERGY: NEARLY EVERY DAY
SUM OF ALL RESPONSES TO PHQ QUESTIONS 1-9: 21
SUM OF ALL RESPONSES TO PHQ QUESTIONS 1-9: 21
8. MOVING OR SPEAKING SO SLOWLY THAT OTHER PEOPLE COULD HAVE NOTICED. OR THE OPPOSITE, BEING SO FIGETY OR RESTLESS THAT YOU HAVE BEEN MOVING AROUND A LOT MORE THAN USUAL: NOT AT ALL
1. LITTLE INTEREST OR PLEASURE IN DOING THINGS: NEARLY EVERY DAY
6. FEELING BAD ABOUT YOURSELF - OR THAT YOU ARE A FAILURE OR HAVE LET YOURSELF OR YOUR FAMILY DOWN: NEARLY EVERY DAY
5. POOR APPETITE OR OVEREATING: NEARLY EVERY DAY
2. FEELING DOWN, DEPRESSED OR HOPELESS: NEARLY EVERY DAY
10. IF YOU CHECKED OFF ANY PROBLEMS, HOW DIFFICULT HAVE THESE PROBLEMS MADE IT FOR YOU TO DO YOUR WORK, TAKE CARE OF THINGS AT HOME, OR GET ALONG WITH OTHER PEOPLE: VERY DIFFICULT
SUM OF ALL RESPONSES TO PHQ QUESTIONS 1-9: 21

## 2024-05-28 NOTE — PROGRESS NOTES
Sharon Umana  Identified pt with two pt identifiers(name and ).  Chief Complaint   Patient presents with    Depression       1. Have you been to the ER, urgent care clinic since your last visit?  Hospitalized since your last visit? NO    2. Have you seen or consulted any other health care providers outside of the [facility] since your last visit?  Include any pap smears or colon screening. NO      Provider notified of reason for visit, vitals and flowsheets obtained on patients.     Patient received paperwork for advance directive during previous visit but has not completed at this time     Reviewed record In preparation for visit, huddled with provider and have obtained necessary documentation      Health Maintenance Due   Topic    HIV screen     DTaP/Tdap/Td vaccine (1 - Tdap)    Breast cancer screen     Cervical cancer screen        Wt Readings from Last 3 Encounters:   23 (!) 148.8 kg (328 lb)   10/15/21 (!) 142.9 kg (315 lb)   21 (!) 140.6 kg (310 lb)     Temp Readings from Last 3 Encounters:   23 98.7 °F (37.1 °C) (Oral)     BP Readings from Last 3 Encounters:   23 (!) 140/89   10/15/21 124/76   21 (!) 142/80     Pulse Readings from Last 3 Encounters:   23 56   10/15/21 92   21 94          No data to display                  Learning Assessment:  :         2024     1:20 PM   [facility] AMB LEARNING ASSESSMENT   Primary Learner Patient   level of education GRADUATED HIGH SCHOOL OR GED   Primary Language ENGLISH   Learning Preference DEMONSTRATION   Answered By Patient   Relationship to Learner SELF       Fall Risk Assessment:  :         2022     2:00 PM   Amb Fall Risk Assessment and TUG Test   Fall in past 12 months? 0   Able to walk? Yes       Abuse Screening:  :         2024     1:00 PM   AMB Abuse Screening   Do you ever feel afraid of your partner? N   Are you in a relationship with someone who physically or mentally threatens you?

## 2024-06-11 ENCOUNTER — PATIENT MESSAGE (OUTPATIENT)
Facility: CLINIC | Age: 64
End: 2024-06-11

## 2024-06-11 DIAGNOSIS — G89.4 CHRONIC PAIN SYNDROME: ICD-10-CM

## 2024-06-11 NOTE — TELEPHONE ENCOUNTER
Caller requests Refill of:  traMADol (ULTRAM) 50 MG tablet       Please send to:  [facility]/PHARMACY #[id number] - [place] - [address] - P [phone] - F [phone] [[id number]]       Visit Appointment History:  Future Appointments:  No future appointments.      Last Appointment With Me:  5/28/2024         Caller confirmed instructions and dosages as correct.    Caller was advised that Meds will be refilled as soon as possible, however there can be a 48-72 business hour turn around on refill requests.

## 2024-06-11 NOTE — TELEPHONE ENCOUNTER
PCP: Dedra Sandhu MD     Last appt:  5/28/2024    No future appointments.       Requested Prescriptions     Pending Prescriptions Disp Refills    traMADol (ULTRAM) 50 MG tablet 45 tablet 0     Sig: Take 1 tablet by mouth every 8 hours as needed for Pain for up to 15 days. Max Daily Amount: 150 mg

## 2024-06-13 RX ORDER — TRAMADOL HYDROCHLORIDE 50 MG/1
50 TABLET ORAL EVERY 8 HOURS PRN
Qty: 90 TABLET | Refills: 0 | Status: SHIPPED | OUTPATIENT
Start: 2024-06-13 | End: 2024-07-13

## 2024-06-13 NOTE — TELEPHONE ENCOUNTER
From: Sharon Umana  To: Dr. Dedra Sandhu  Sent: 6/11/2024 1:59 PM EDT  Subject: My Prescription Insurance    Hello Dr. Sandhu,  Since loosing my Medicaid cov, Medicare has provided me with a new Insurance Provider for my prescriptions eff 5/1/24. [employer] is the company and would only allow me to get a 7 day supply (21) pills of my Tramadol when i picked it up in May , apparently this is how the company operates with a new prescription and thereafter a new prescription would be needed for a 30 day supply. A rep said she would reach out to you for the new prescription but i wanted to make you aware as to why you would be receiving the call from [employer].     Also , I talked with the Ins company about the weight loss injections that we talked about when we had a visit. They do not cover the injectables but told me if you could give them a prior authorization with specific medical reasons why i need the medication it would most likely be approved for 1 time and covered for 1 yr . if you willing to do that , Id like to research (unless you know) which injectable would be best for me ( gives best weight loss %) and also least side effects . Id heard there was a new medication that had no side effects. I really cant afford to have stomach issues I already have a hard time getting to the restroom . If i have stomach issues with the medication it would be a added hardship .     Lastly, Id like to get more information on the [facility] you mentioned at our last visit . If you have any info or can give me the name so i can look it up id appreciate it.

## 2024-07-20 NOTE — TELEPHONE ENCOUNTER
Verified patients name and date of birth. Gave patient instructions per provider. She stated she will also look into dermatologists in area closer to her home. Advised her to make virtual visit if not able to get appt. Patient stated understanding. No

## 2024-08-02 ENCOUNTER — TELEPHONE (OUTPATIENT)
Facility: CLINIC | Age: 64
End: 2024-08-02

## 2024-08-02 NOTE — TELEPHONE ENCOUNTER
[facility] (PC)   (formerly [facility])   Phone:  [phone]      Fax:  [phone] [address], [address]  [place] [address]    Name:  Sharon Umana  YOB: 1960    Patient called inquiring about Primary Care at Home services.  She states that she is followed by Dr. Dedra Sandhu with virtual visits and Dr. Sandhu suggested she look in to home based primary care visits.      Patient states that she is currently uninsured.  She has completed a [facility] Financial Assistance application.  She states that she lost her Medicaid due to the amount of her Social Security disability income.  She says she opted out of Medicare disability due to the cost to her each month.     Patient says that she would like to have some lab work done.    Patient lives at [address] [place] [address], [facility].   This nurse explained that patient lives outside of the  geographic radius for Primary Care at Home; her address is not one of the zip codes that Primary Care at Home services.      This nurse also explained that if she were within the radius for Primary Care at Home, our practice does not have a source for a lab that will run blood work for free or reduced cost, so if we draw labs on uninsured patients, the cost of the labwork is out of pocket.      Marjan Novak RN, Gerontological Nursing-BC, PN

## 2024-09-03 DIAGNOSIS — G89.4 CHRONIC PAIN SYNDROME: ICD-10-CM

## 2024-09-03 RX ORDER — TRAMADOL HYDROCHLORIDE 50 MG/1
50 TABLET ORAL EVERY 8 HOURS PRN
Qty: 90 TABLET | Refills: 0 | Status: SHIPPED | OUTPATIENT
Start: 2024-09-03 | End: 2024-09-04 | Stop reason: SDUPTHER

## 2024-09-03 NOTE — TELEPHONE ENCOUNTER
PCP: Dedra Sandhu MD     Last appt:  5/28/2024    No future appointments.       Requested Prescriptions     Pending Prescriptions Disp Refills    traMADol (ULTRAM) 50 MG tablet 90 tablet 0     Sig: Take 1 tablet by mouth every 8 hours as needed for Pain for up to 30 days. Max Daily Amount: 150 mg

## 2024-09-04 RX ORDER — TRAMADOL HYDROCHLORIDE 50 MG/1
50 TABLET ORAL EVERY 8 HOURS PRN
Qty: 90 TABLET | Refills: 0 | Status: SHIPPED | OUTPATIENT
Start: 2024-09-04 | End: 2024-10-04

## 2024-09-04 NOTE — TELEPHONE ENCOUNTER
Pt changed the pharmacy.       PCP: Dedra Sandhu MD     Last appt:  5/28/2024      Future Appointments   Date Time Provider Department Center   10/1/2024  1:00 PM Dedra Sandhu MD [facility] DEP          Requested Prescriptions     Pending Prescriptions Disp Refills    traMADol (ULTRAM) 50 MG tablet 90 tablet 0     Sig: Take 1 tablet by mouth every 8 hours as needed for Pain for up to 30 days. Max Daily Amount: 150 mg     Signed Prescriptions Disp Refills    traMADol (ULTRAM) 50 MG tablet 90 tablet 0     Sig: Take 1 tablet by mouth every 8 hours as needed for Pain for up to 30 days. Max Daily Amount: 150 mg     Authorizing Provider: BILLY MONTAÑO

## 2024-10-01 ENCOUNTER — TELEMEDICINE (OUTPATIENT)
Facility: CLINIC | Age: 64
End: 2024-10-01

## 2024-10-01 DIAGNOSIS — G56.03 BILATERAL CARPAL TUNNEL SYNDROME: ICD-10-CM

## 2024-10-01 DIAGNOSIS — I10 ESSENTIAL HYPERTENSION: Primary | ICD-10-CM

## 2024-10-01 DIAGNOSIS — I89.0 LYMPHEDEMA OF BOTH LOWER EXTREMITIES: ICD-10-CM

## 2024-10-01 DIAGNOSIS — G89.4 CHRONIC PAIN SYNDROME: ICD-10-CM

## 2024-10-01 DIAGNOSIS — Z79.891 CHRONIC USE OF OPIATE DRUG FOR THERAPEUTIC PURPOSE: ICD-10-CM

## 2024-10-01 DIAGNOSIS — E66.01 MORBID OBESITY WITH BMI OF 50.0-59.9, ADULT: ICD-10-CM

## 2024-10-01 PROCEDURE — 99214 OFFICE O/P EST MOD 30 MIN: CPT | Performed by: INTERNAL MEDICINE

## 2024-10-01 SDOH — ECONOMIC STABILITY: FOOD INSECURITY: WITHIN THE PAST 12 MONTHS, THE FOOD YOU BOUGHT JUST DIDN'T LAST AND YOU DIDN'T HAVE MONEY TO GET MORE.: OFTEN TRUE

## 2024-10-01 SDOH — ECONOMIC STABILITY: FOOD INSECURITY: WITHIN THE PAST 12 MONTHS, YOU WORRIED THAT YOUR FOOD WOULD RUN OUT BEFORE YOU GOT MONEY TO BUY MORE.: OFTEN TRUE

## 2024-10-01 SDOH — ECONOMIC STABILITY: INCOME INSECURITY: HOW HARD IS IT FOR YOU TO PAY FOR THE VERY BASICS LIKE FOOD, HOUSING, MEDICAL CARE, AND HEATING?: HARD

## 2024-10-01 NOTE — PROGRESS NOTES
Sharon Umana  Identified pt with two pt identifiers(name and ).  Chief Complaint   Patient presents with    Pain       1. Have you been to the ER, urgent care clinic since your last visit?  Hospitalized since your last visit? NO    2. Have you seen or consulted any other health care providers outside of the [facility] since your last visit?  Include any pap smears or colon screening. NO      Provider notified of reason for visit, vitals and flowsheets obtained on patients.     Patient received paperwork for advance directive during previous visit but has not completed at this time     Reviewed record In preparation for visit, huddled with provider and have obtained necessary documentation      Health Maintenance Due   Topic    HIV screen     DTaP/Tdap/Td vaccine (1 - Tdap)    Breast cancer screen     Colorectal Cancer Screen     Cervical cancer screen        Wt Readings from Last 3 Encounters:   23 (!) 148.8 kg (328 lb)   10/15/21 (!) 142.9 kg (315 lb)   21 (!) 140.6 kg (310 lb)     Temp Readings from Last 3 Encounters:   23 98.7 °F (37.1 °C) (Oral)     BP Readings from Last 3 Encounters:   23 (!) 140/89   10/15/21 124/76   21 (!) 142/80     Pulse Readings from Last 3 Encounters:   23 56   10/15/21 92   21 94          No data to display                  Learning Assessment:  :         2024     1:20 PM   [facility] AMB LEARNING ASSESSMENT   Primary Learner Patient   level of education GRADUATED HIGH SCHOOL OR GED   Primary Language ENGLISH   Learning Preference DEMONSTRATION   Answered By Patient   Relationship to Learner SELF       Fall Risk Assessment:  :         2022     2:00 PM   Amb Fall Risk Assessment and TUG Test   Fall in past 12 months? 0   Able to walk? Yes       Abuse Screening:  :         2024     1:00 PM   AMB Abuse Screening   Do you ever feel afraid of your partner? N   Are you in a relationship with someone who physically or

## 2024-10-01 NOTE — PROGRESS NOTES
10/1/2024    TELEHEALTH EVALUATION -- Audio/Visual    HPI:    Sharon Umana (:  1960) has requested an audio/video evaluation for the following concern(s):    Complains of increased numbness, stiffness, and tightness at fingers on both hands. Can hardly do anything with her fingers now. Gripping is hard. Right hand worse than left. Using Mg lotion with minimal brief relief. Tramadol helps. Wears wrist braces at night. Was told a few years ago that she had carpal tunnel syndrome that needs surgery.    Has unchanged lymphedema. Takes lymphatic support tincture. Takes CBD gummies for anxiety and to help her sleep.  She has not left her house except to go on the porch for the past 2 years due to anxiety and morbid obesity with lymphedema.    Chronic Pain Review  Sharon Umana RTC today to follow up on chronic pain. We discussed her left hip pain, chronic low back pain, and pain at both knees. Significant changes since last visit: none. She has difficulty doing her normal daily activities. She reports the following adverse side effects: none. Pain worse with standing or walking. Ambulates with a cane. Uses a wheelchair sometimes at home. Takes Tramadol 50 mg 1 tab 1 time a day as needed for pain.      Least pain over the last week has been 4/10.  Worst pain over the last week has been 10/10.  Opioid Risk Tool Reviewed: YES  Aberrant behaviors: None.    Urine Drug Screen: YES (23)  Controlled substance agreement on file: YES.     reviewed: yes  Pill count is consistent with her prescription: yes  Concomitant use of a benzodiazepine: no  Active daily MME is 15 mg.  Naloxone prescription not warranted.    Prior to Visit Medications    Medication Sig Taking? Authorizing Provider   traMADol (ULTRAM) 50 MG tablet Take 1 tablet by mouth every 8 hours as needed for Pain for up to 30 days. Max Daily Amount: 150 mg Yes Usha Dunham MD   nystatin (MYCOSTATIN) 838788 UNIT/GM cream Apply topically 2

## 2024-10-21 ENCOUNTER — PATIENT MESSAGE (OUTPATIENT)
Facility: CLINIC | Age: 64
End: 2024-10-21

## 2024-10-21 DIAGNOSIS — G89.4 CHRONIC PAIN SYNDROME: ICD-10-CM

## 2024-10-21 RX ORDER — TRAMADOL HYDROCHLORIDE 50 MG/1
50 TABLET ORAL EVERY 8 HOURS PRN
Qty: 90 TABLET | Refills: 0 | Status: SHIPPED | OUTPATIENT
Start: 2024-10-21 | End: 2024-11-20

## 2024-10-21 NOTE — TELEPHONE ENCOUNTER
PCP: Dedra Sandhu MD     Last appt: 10/1/2024    Future Appointments   Date Time Provider Department Center   2/5/2025  1:20 PM Dedra Sandhu MD [facility] DEP          Requested Prescriptions     Pending Prescriptions Disp Refills    traMADol (ULTRAM) 50 MG tablet 90 tablet 0     Sig: Take 1 tablet by mouth every 8 hours as needed for Pain for up to 30 days. Max Daily Amount: 150 mg

## 2024-12-19 NOTE — TELEPHONE ENCOUNTER
PCP: Monae Montano MD     Last appt:  8/22/2023    No future appointments. Requested Prescriptions     Pending Prescriptions Disp Refills    traMADol (ULTRAM) 50 MG tablet 45 tablet 0     Sig: Take 1 tablet by mouth every 8 hours as needed for Pain for up to 15 days.  Max Daily Amount: 150 mg
Follow up with a primary care doctor    Our Emergency Department Referral Coordinators will be reaching out to you in the next 24-48 hours from 9:00am to 5:00pm with a follow up appointment. Please expect a phone call from the hospital in that time frame. If you do not receive a call or if you have any questions or concerns, you can reach them at   [phone]    Cervical Radiculopathy    WHAT YOU NEED TO KNOW:    Cervical radiculopathy is a painful condition that happens when a spinal nerve in your neck is pinched or irritated. Vertebral Column         DISCHARGE INSTRUCTIONS:    Medicines: You may need any of the following:    NSAIDs help decrease swelling and pain. This medicine can be bought without a doctor's order. This medicine can cause stomach bleeding or kidney problems in certain people. If you take blood thinner medicine, always ask your healthcare provider if NSAIDs are safe for you. Always read the medicine label and follow the directions on it before using this medicine.      Prescription pain medicine helps decrease pain. Do not wait until the pain is severe before you take this medicine.      Steroids help decrease pain and swelling. These may be given as a pill or as an injection in your neck. You may need more than 1 injection if your symptoms do not improve after the first treatment.       Take your medicine as directed. Contact your healthcare provider if you think your medicine is not helping or if you have side effects. Tell him of her if you are allergic to any medicine. Keep a list of the medicines, vitamins, and herbs you take. Include the amounts, and when and why you take them. Bring the list or the pill bottles to follow-up visits. Carry your medicine list with you in case of an emergency.    Follow up with your healthcare provider or spine specialist as directed: Write down your questions so you remember to ask them during your visits.     Physical therapy: Your healthcare provider may suggest physical therapy to stretch and strengthen your muscles. Your physical therapist can teach you how to improve your posture and the way you hold your neck. He may also teach you how to be safely active and avoid further injury. He can also help you develop an exercise program that is safe for your back and neck.     Self-care:     Ice helps decrease swelling and pain. Ice may also help prevent tissue damage. Use an ice pack, or put crushed ice in a plastic bag. Cover it with a towel and place it on your neck for 15 to 20 minutes every hour or as directed.      Rest when you feel it is needed. Slowly start to do more each day. Return to your daily activities as directed.       Wear a soft collar. You may be given a soft collar to support your neck while you sleep. Wear the soft collar only as directed.Cervical Collars           Do light stretches and regular exercise. Your healthcare provider may suggest light stretches to help decrease stiffness in your neck and arm as you recover. After your pain is controlled, you may benefit from regular exercise. Ask what type of exercise is safe for your back and neck.       Review your work area. A comfortable work area can help prevent neck strain. Ask your employer for an ergonomic review to check the position of your desk, chair, phone, and computer. Make any necessary adjustments for your comfort.     Contact your healthcare provider or spine specialist if:     You have a fever.      You are losing weight without trying.      Your pain is worse, even with medicine.      One or both hands feel more numb than before, or you cannot move your fingers well.      You have questions or concerns about your condition or care.         © Copyright [employer] 2019 All illustrations and images included in CareNotes are the copyrighted property of [employer] or [employer].

## 2024-12-27 DIAGNOSIS — G89.4 CHRONIC PAIN SYNDROME: ICD-10-CM

## 2024-12-27 RX ORDER — TRAMADOL HYDROCHLORIDE 50 MG/1
50 TABLET ORAL EVERY 8 HOURS PRN
Qty: 90 TABLET | Refills: 0 | Status: SHIPPED | OUTPATIENT
Start: 2024-12-27 | End: 2025-01-26

## 2024-12-27 NOTE — TELEPHONE ENCOUNTER
PCP: Dedra Sandhu MD     Last appt:  10/1/2024      Future Appointments   Date Time Provider Department Center   2/5/2025  1:20 PM Dedra Sandhu MD [facility]          Requested Prescriptions     Pending Prescriptions Disp Refills    traMADol (ULTRAM) 50 MG tablet [Pharmacy Med Name: TRAMADOL HCL 50 MG TABLET] 90 tablet 0     Sig: Take 1 tablet by mouth every 8 hours as needed for Pain for up to 30 days. Max Daily Amount: 150 mg

## 2025-02-05 ENCOUNTER — TELEMEDICINE (OUTPATIENT)
Facility: CLINIC | Age: 65
End: 2025-02-05

## 2025-02-05 DIAGNOSIS — F32.1 MODERATE MAJOR DEPRESSION (HCC): ICD-10-CM

## 2025-02-05 DIAGNOSIS — G89.4 CHRONIC PAIN SYNDROME: Primary | ICD-10-CM

## 2025-02-05 PROCEDURE — 99213 OFFICE O/P EST LOW 20 MIN: CPT | Performed by: INTERNAL MEDICINE

## 2025-02-05 SDOH — ECONOMIC STABILITY: INCOME INSECURITY: IN THE LAST 12 MONTHS, WAS THERE A TIME WHEN YOU WERE NOT ABLE TO PAY THE MORTGAGE OR RENT ON TIME?: YES

## 2025-02-05 SDOH — ECONOMIC STABILITY: TRANSPORTATION INSECURITY
IN THE PAST 12 MONTHS, HAS LACK OF TRANSPORTATION KEPT YOU FROM MEETINGS, WORK, OR FROM GETTING THINGS NEEDED FOR DAILY LIVING?: YES

## 2025-02-05 SDOH — ECONOMIC STABILITY: FOOD INSECURITY: WITHIN THE PAST 12 MONTHS, THE FOOD YOU BOUGHT JUST DIDN'T LAST AND YOU DIDN'T HAVE MONEY TO GET MORE.: SOMETIMES TRUE

## 2025-02-05 SDOH — ECONOMIC STABILITY: FOOD INSECURITY: WITHIN THE PAST 12 MONTHS, YOU WORRIED THAT YOUR FOOD WOULD RUN OUT BEFORE YOU GOT MONEY TO BUY MORE.: OFTEN TRUE

## 2025-02-05 SDOH — ECONOMIC STABILITY: TRANSPORTATION INSECURITY
IN THE PAST 12 MONTHS, HAS THE LACK OF TRANSPORTATION KEPT YOU FROM MEDICAL APPOINTMENTS OR FROM GETTING MEDICATIONS?: YES

## 2025-02-05 ASSESSMENT — PATIENT HEALTH QUESTIONNAIRE - PHQ9
9. THOUGHTS THAT YOU WOULD BE BETTER OFF DEAD, OR OF HURTING YOURSELF: NOT AT ALL
3. TROUBLE FALLING OR STAYING ASLEEP: NEARLY EVERY DAY
10. IF YOU CHECKED OFF ANY PROBLEMS, HOW DIFFICULT HAVE THESE PROBLEMS MADE IT FOR YOU TO DO YOUR WORK, TAKE CARE OF THINGS AT HOME, OR GET ALONG WITH OTHER PEOPLE: EXTREMELY DIFFICULT
5. POOR APPETITE OR OVEREATING: MORE THAN HALF THE DAYS
SUM OF ALL RESPONSES TO PHQ QUESTIONS 1-9: 20
SUM OF ALL RESPONSES TO PHQ QUESTIONS 1-9: 20
4. FEELING TIRED OR HAVING LITTLE ENERGY: NEARLY EVERY DAY
8. MOVING OR SPEAKING SO SLOWLY THAT OTHER PEOPLE COULD HAVE NOTICED. OR THE OPPOSITE, BEING SO FIGETY OR RESTLESS THAT YOU HAVE BEEN MOVING AROUND A LOT MORE THAN USUAL: NOT AT ALL
2. FEELING DOWN, DEPRESSED OR HOPELESS: NEARLY EVERY DAY
SUM OF ALL RESPONSES TO PHQ QUESTIONS 1-9: 20
1. LITTLE INTEREST OR PLEASURE IN DOING THINGS: NEARLY EVERY DAY
SUM OF ALL RESPONSES TO PHQ QUESTIONS 1-9: 20
7. TROUBLE CONCENTRATING ON THINGS, SUCH AS READING THE NEWSPAPER OR WATCHING TELEVISION: NEARLY EVERY DAY
6. FEELING BAD ABOUT YOURSELF - OR THAT YOU ARE A FAILURE OR HAVE LET YOURSELF OR YOUR FAMILY DOWN: NEARLY EVERY DAY
SUM OF ALL RESPONSES TO PHQ9 QUESTIONS 1 & 2: 6

## 2025-02-05 ASSESSMENT — COLUMBIA-SUICIDE SEVERITY RATING SCALE - C-SSRS
1. WITHIN THE PAST MONTH, HAVE YOU WISHED YOU WERE DEAD OR WISHED YOU COULD GO TO SLEEP AND NOT WAKE UP?: NO
2. HAVE YOU ACTUALLY HAD ANY THOUGHTS OF KILLING YOURSELF?: NO
6. HAVE YOU EVER DONE ANYTHING, STARTED TO DO ANYTHING, OR PREPARED TO DO ANYTHING TO END YOUR LIFE?: NO

## 2025-02-05 NOTE — PROGRESS NOTES
2025    TELEHEALTH EVALUATION -- Audio/Visual    HPI:    Sharon Umana (:  1960) has requested an audio/video evaluation for the following concern(s):    Complains of chronic pain at hips, knees, and back. Is on Tramadol; almost out of it. Last prescribed 24. Also takes Aleve 200 mg daily as needed for pain.  Wants refill on Tramadol. Has not left house in over 2 years. Her car is not working. Has a daughter who lives in [place]. Patient states that her daughter lives in [place], works, and cares for her children. Thinks she doesn't have time to give her rides. No health insurance currently.    Prior to Visit Medications    Medication Sig Taking? Authorizing Provider   nystatin (MYCOSTATIN) 613105 UNIT/GM cream Apply topically 2 times daily. Yes Dedra Sandhu MD   Cholecalciferol 100 MCG (4000 UT) CAPS Take 4,000 Units by mouth daily Yes Automatic Reconciliation, Ar   Naproxen Sodium 220 MG CAPS Take by mouth Yes Automatic Reconciliation, Ar       Social History     Tobacco Use    Smoking status: Never    Smokeless tobacco: Never   Substance Use Topics    Alcohol use: No    Drug use: Yes     Types: Marijuana (Weed)        No Known Allergies    PHYSICAL EXAMINATION:  [ INSTRUCTIONS:  \"[x]\" Indicates a positive item  \"[]\" Indicates a negative item  -- DELETE ALL ITEMS NOT EXAMINED]  Vital Signs: (As obtained by patient/caregiver or practitioner observation)    Blood pressure-  Heart rate-    Respiratory rate-    Temperature-  Pulse oximetry-     Constitutional: [x] Appears well-developed and well-nourished [x] No apparent distress      [] Abnormal-   Mental status  [] Alert and awake  [] Oriented to person/place/time []Able to follow commands      Eyes:  EOM    [x]  Normal  [] Abnormal-  Sclera  [x]  Normal  [] Abnormal -         Discharge []  None visible  [] Abnormal -    HENT:   [x] Normocephalic, atraumatic.  [] Abnormal   [] Mouth/Throat: Mucous membranes are moist.     External Ears

## 2025-02-05 NOTE — PROGRESS NOTES
Sharon Umana  Identified pt with two pt identifiers(name and ).  Chief Complaint   Patient presents with    Pain       1. Have you been to the ER, urgent care clinic since your last visit?  Hospitalized since your last visit? NO    2. Have you seen or consulted any other health care providers outside of the [facility] since your last visit?  Include any pap smears or colon screening. NO      Provider notified of reason for visit, vitals and flowsheets obtained on patients.     Patient received paperwork for advance directive during previous visit but has not completed at this time     Reviewed record In preparation for visit, huddled with provider and have obtained necessary documentation      Health Maintenance Due   Topic    HIV screen     DTaP/Tdap/Td vaccine (1 - Tdap)    Breast cancer screen     Colorectal Cancer Screen     Cervical cancer screen        Wt Readings from Last 3 Encounters:   23 (!) 148.8 kg (328 lb)   10/15/21 (!) 142.9 kg (315 lb)   21 (!) 140.6 kg (310 lb)     Temp Readings from Last 3 Encounters:   23 98.7 °F (37.1 °C) (Oral)     BP Readings from Last 3 Encounters:   23 (!) 140/89   10/15/21 124/76   21 (!) 142/80     Pulse Readings from Last 3 Encounters:   23 56   10/15/21 92   21 94          No data to display                  Learning Assessment:  :         2024     1:20 PM   [facility] AMB LEARNING ASSESSMENT   Primary Learner Patient   level of education GRADUATED HIGH SCHOOL OR GED   Primary Language ENGLISH   Learning Preference DEMONSTRATION   Answered By Patient   Relationship to Learner SELF       Fall Risk Assessment:  :         2022     2:00 PM   Amb Fall Risk Assessment and TUG Test   Fall in past 12 months? 0   Able to walk? Yes       Abuse Screening:  :         2024     1:00 PM   AMB Abuse Screening   Do you ever feel afraid of your partner? N   Are you in a relationship with someone who physically or

## 2025-03-14 ENCOUNTER — PATIENT MESSAGE (OUTPATIENT)
Facility: CLINIC | Age: 65
End: 2025-03-14

## 2025-03-14 DIAGNOSIS — B37.2 INTERTRIGINOUS CANDIDIASIS: ICD-10-CM

## 2025-03-17 RX ORDER — NYSTATIN 100000 U/G
CREAM TOPICAL
Qty: 90 EACH | Refills: 2 | Status: SHIPPED | OUTPATIENT
Start: 2025-03-17

## 2025-03-21 RX ORDER — CICLOPIROX 80 MG/ML
SOLUTION TOPICAL
Qty: 6 ML | Refills: 5 | Status: SHIPPED | OUTPATIENT
Start: 2025-03-21